# Patient Record
Sex: FEMALE | Race: BLACK OR AFRICAN AMERICAN | Employment: FULL TIME | URBAN - METROPOLITAN AREA
[De-identification: names, ages, dates, MRNs, and addresses within clinical notes are randomized per-mention and may not be internally consistent; named-entity substitution may affect disease eponyms.]

---

## 2017-01-30 ENCOUNTER — OFFICE VISIT (OUTPATIENT)
Dept: INTERNAL MEDICINE CLINIC | Age: 27
End: 2017-01-30

## 2017-01-30 VITALS
WEIGHT: 267 LBS | SYSTOLIC BLOOD PRESSURE: 120 MMHG | OXYGEN SATURATION: 98 % | BODY MASS INDEX: 44.48 KG/M2 | DIASTOLIC BLOOD PRESSURE: 80 MMHG | HEIGHT: 65 IN | HEART RATE: 83 BPM

## 2017-01-30 DIAGNOSIS — R20.0 NUMBNESS AND TINGLING OF BOTH LEGS: ICD-10-CM

## 2017-01-30 DIAGNOSIS — E11.69 TYPE 2 DIABETES MELLITUS WITH OTHER SPECIFIED COMPLICATION, WITH LONG-TERM CURRENT USE OF INSULIN (HCC): Primary | ICD-10-CM

## 2017-01-30 DIAGNOSIS — F17.200 TOBACCO DEPENDENCE: ICD-10-CM

## 2017-01-30 DIAGNOSIS — J45.909 ASTHMA, MODERATE: ICD-10-CM

## 2017-01-30 DIAGNOSIS — Z79.4 TYPE 2 DIABETES MELLITUS WITH OTHER SPECIFIED COMPLICATION, WITH LONG-TERM CURRENT USE OF INSULIN (HCC): Primary | ICD-10-CM

## 2017-01-30 DIAGNOSIS — E66.01 MORBID OBESITY WITH BMI OF 40.0-44.9, ADULT (HCC): ICD-10-CM

## 2017-01-30 DIAGNOSIS — E55.9 VITAMIN D DEFICIENCY: ICD-10-CM

## 2017-01-30 DIAGNOSIS — N92.6 MENSTRUAL IRREGULARITY: ICD-10-CM

## 2017-01-30 DIAGNOSIS — E78.2 MIXED HYPERLIPIDEMIA: ICD-10-CM

## 2017-01-30 DIAGNOSIS — R20.2 NUMBNESS AND TINGLING OF BOTH LEGS: ICD-10-CM

## 2017-01-30 LAB
GLUCOSE BLD-MCNC: 127 MG/DL
HBA1C MFR BLD: 6.3 %

## 2017-01-30 PROCEDURE — 83036 HEMOGLOBIN GLYCOSYLATED A1C: CPT | Performed by: INTERNAL MEDICINE

## 2017-01-30 PROCEDURE — 99406 BEHAV CHNG SMOKING 3-10 MIN: CPT | Performed by: INTERNAL MEDICINE

## 2017-01-30 PROCEDURE — 99214 OFFICE O/P EST MOD 30 MIN: CPT | Performed by: INTERNAL MEDICINE

## 2017-01-30 PROCEDURE — 82962 GLUCOSE BLOOD TEST: CPT | Performed by: INTERNAL MEDICINE

## 2017-05-09 ENCOUNTER — OFFICE VISIT (OUTPATIENT)
Dept: INTERNAL MEDICINE CLINIC | Age: 27
End: 2017-05-09

## 2017-05-09 VITALS
DIASTOLIC BLOOD PRESSURE: 86 MMHG | WEIGHT: 279 LBS | OXYGEN SATURATION: 98 % | SYSTOLIC BLOOD PRESSURE: 120 MMHG | HEART RATE: 78 BPM | BODY MASS INDEX: 46.48 KG/M2 | HEIGHT: 65 IN

## 2017-05-09 DIAGNOSIS — F17.200 TOBACCO DEPENDENCE: ICD-10-CM

## 2017-05-09 DIAGNOSIS — J45.909 ASTHMA, MODERATE: ICD-10-CM

## 2017-05-09 DIAGNOSIS — Z79.4 TYPE 2 DIABETES MELLITUS WITH OTHER SPECIFIED COMPLICATION, WITH LONG-TERM CURRENT USE OF INSULIN (HCC): Primary | ICD-10-CM

## 2017-05-09 DIAGNOSIS — R20.2 NUMBNESS AND TINGLING: ICD-10-CM

## 2017-05-09 DIAGNOSIS — E78.2 MIXED HYPERLIPIDEMIA: ICD-10-CM

## 2017-05-09 DIAGNOSIS — N91.2 AMENORRHEA: ICD-10-CM

## 2017-05-09 DIAGNOSIS — R20.0 NUMBNESS AND TINGLING: ICD-10-CM

## 2017-05-09 DIAGNOSIS — R10.30 LOWER ABDOMINAL PAIN: ICD-10-CM

## 2017-05-09 DIAGNOSIS — E11.69 TYPE 2 DIABETES MELLITUS WITH OTHER SPECIFIED COMPLICATION, WITH LONG-TERM CURRENT USE OF INSULIN (HCC): Primary | ICD-10-CM

## 2017-05-09 DIAGNOSIS — E55.9 VITAMIN D DEFICIENCY: ICD-10-CM

## 2017-05-09 DIAGNOSIS — Z11.3 SCREEN FOR STD (SEXUALLY TRANSMITTED DISEASE): ICD-10-CM

## 2017-05-09 DIAGNOSIS — E66.01 MORBID OBESITY WITH BMI OF 45.0-49.9, ADULT (HCC): ICD-10-CM

## 2017-05-09 DIAGNOSIS — L60.8 TOENAIL DEFORMITY: ICD-10-CM

## 2017-05-09 DIAGNOSIS — R35.0 URINARY FREQUENCY: ICD-10-CM

## 2017-05-09 LAB
BILIRUBIN, POC: NORMAL
BLOOD URINE, POC: NORMAL
CLARITY, POC: CLEAR
COLOR, POC: YELLOW
CONTROL: NORMAL
GLUCOSE BLD-MCNC: 129 MG/DL
GLUCOSE URINE, POC: NORMAL
HBA1C MFR BLD: 6.6 %
KETONES, POC: NORMAL
LEUKOCYTE EST, POC: NORMAL
NITRITE, POC: NORMAL
PH, POC: 6
PREGNANCY TEST URINE, POC: NEGATIVE
PROTEIN, POC: NORMAL
SPECIFIC GRAVITY, POC: 1.03
UROBILINOGEN, POC: 0.2

## 2017-05-09 PROCEDURE — 81002 URINALYSIS NONAUTO W/O SCOPE: CPT | Performed by: INTERNAL MEDICINE

## 2017-05-09 PROCEDURE — 99214 OFFICE O/P EST MOD 30 MIN: CPT | Performed by: INTERNAL MEDICINE

## 2017-05-09 PROCEDURE — 81025 URINE PREGNANCY TEST: CPT | Performed by: INTERNAL MEDICINE

## 2017-05-09 PROCEDURE — 83036 HEMOGLOBIN GLYCOSYLATED A1C: CPT | Performed by: INTERNAL MEDICINE

## 2017-05-09 PROCEDURE — 82962 GLUCOSE BLOOD TEST: CPT | Performed by: INTERNAL MEDICINE

## 2017-05-09 PROCEDURE — 99406 BEHAV CHNG SMOKING 3-10 MIN: CPT | Performed by: INTERNAL MEDICINE

## 2017-05-09 RX ORDER — MULTIVIT-MIN/IRON/FOLIC ACID/K 18-600-40
1 CAPSULE ORAL DAILY
Qty: 30 CAPSULE | Refills: 1 | Status: SHIPPED | OUTPATIENT
Start: 2017-05-09 | End: 2017-06-20 | Stop reason: SDUPTHER

## 2017-05-09 RX ORDER — SIMVASTATIN 10 MG
10 TABLET ORAL NIGHTLY
Qty: 30 TABLET | Refills: 0 | Status: SHIPPED | OUTPATIENT
Start: 2017-05-09 | End: 2017-06-20 | Stop reason: SDUPTHER

## 2017-05-10 LAB
C. TRACHOMATIS DNA ,URINE: NEGATIVE
N. GONORRHOEAE DNA, URINE: NEGATIVE

## 2017-05-11 DIAGNOSIS — R20.2 PARESTHESIA OF BOTH FEET: ICD-10-CM

## 2017-05-11 DIAGNOSIS — Z11.3 SCREEN FOR STD (SEXUALLY TRANSMITTED DISEASE): ICD-10-CM

## 2017-05-11 LAB
FOLATE: 12.47 NG/ML (ref 4.78–24.2)
HEPATITIS C ANTIBODY INTERPRETATION: NORMAL
VITAMIN B-12: 608 PG/ML (ref 211–911)

## 2017-05-12 LAB — HIV-1 AND HIV-2 ANTIBODIES: NEGATIVE

## 2017-06-20 ENCOUNTER — OFFICE VISIT (OUTPATIENT)
Dept: INTERNAL MEDICINE CLINIC | Age: 27
End: 2017-06-20

## 2017-06-20 VITALS
SYSTOLIC BLOOD PRESSURE: 120 MMHG | HEART RATE: 87 BPM | HEIGHT: 65 IN | WEIGHT: 282 LBS | DIASTOLIC BLOOD PRESSURE: 80 MMHG | BODY MASS INDEX: 46.98 KG/M2 | OXYGEN SATURATION: 98 %

## 2017-06-20 DIAGNOSIS — M54.5 MIDLINE LOW BACK PAIN, UNSPECIFIED CHRONICITY, WITH SCIATICA PRESENCE UNSPECIFIED: ICD-10-CM

## 2017-06-20 DIAGNOSIS — N39.0 URINARY TRACT INFECTION WITHOUT HEMATURIA, SITE UNSPECIFIED: ICD-10-CM

## 2017-06-20 DIAGNOSIS — E66.01 MORBID OBESITY WITH BMI OF 45.0-49.9, ADULT (HCC): ICD-10-CM

## 2017-06-20 DIAGNOSIS — Z79.4 TYPE 2 DIABETES MELLITUS WITH OTHER SPECIFIED COMPLICATION, WITH LONG-TERM CURRENT USE OF INSULIN (HCC): Primary | ICD-10-CM

## 2017-06-20 DIAGNOSIS — R20.0 NUMBNESS AND TINGLING: ICD-10-CM

## 2017-06-20 DIAGNOSIS — Z87.891 SMOKING HISTORY: ICD-10-CM

## 2017-06-20 DIAGNOSIS — R20.2 NUMBNESS AND TINGLING: ICD-10-CM

## 2017-06-20 DIAGNOSIS — J45.909 ASTHMA, MODERATE: ICD-10-CM

## 2017-06-20 DIAGNOSIS — E78.2 MIXED HYPERLIPIDEMIA: ICD-10-CM

## 2017-06-20 DIAGNOSIS — E11.69 TYPE 2 DIABETES MELLITUS WITH OTHER SPECIFIED COMPLICATION, WITH LONG-TERM CURRENT USE OF INSULIN (HCC): Primary | ICD-10-CM

## 2017-06-20 DIAGNOSIS — E55.9 VITAMIN D DEFICIENCY: ICD-10-CM

## 2017-06-20 LAB
BILIRUBIN, POC: NORMAL
BLOOD URINE, POC: NORMAL
CLARITY, POC: CLEAR
COLOR, POC: YELLOW
GLUCOSE BLD-MCNC: 155 MG/DL
GLUCOSE URINE, POC: NORMAL
KETONES, POC: NORMAL
LEUKOCYTE EST, POC: NORMAL
NITRITE, POC: NORMAL
PH, POC: 6
PROTEIN, POC: NORMAL
SPECIFIC GRAVITY, POC: 1.02
UROBILINOGEN, POC: 0.2

## 2017-06-20 PROCEDURE — 81002 URINALYSIS NONAUTO W/O SCOPE: CPT | Performed by: INTERNAL MEDICINE

## 2017-06-20 PROCEDURE — 99214 OFFICE O/P EST MOD 30 MIN: CPT | Performed by: INTERNAL MEDICINE

## 2017-06-20 PROCEDURE — 82962 GLUCOSE BLOOD TEST: CPT | Performed by: INTERNAL MEDICINE

## 2017-06-20 RX ORDER — SIMVASTATIN 10 MG
10 TABLET ORAL NIGHTLY
Qty: 30 TABLET | Refills: 0 | Status: SHIPPED | OUTPATIENT
Start: 2017-06-20 | End: 2017-08-22 | Stop reason: SDUPTHER

## 2017-06-20 RX ORDER — SULFAMETHOXAZOLE AND TRIMETHOPRIM 800; 160 MG/1; MG/1
1 TABLET ORAL 2 TIMES DAILY
Qty: 6 TABLET | Refills: 0 | Status: SHIPPED | OUTPATIENT
Start: 2017-06-20 | End: 2017-06-23

## 2017-06-20 RX ORDER — MULTIVIT-MIN/IRON/FOLIC ACID/K 18-600-40
1 CAPSULE ORAL DAILY
Qty: 30 CAPSULE | Refills: 1 | Status: SHIPPED | OUTPATIENT
Start: 2017-06-20 | End: 2017-08-22 | Stop reason: SDUPTHER

## 2017-06-22 LAB
ORGANISM: ABNORMAL
URINE CULTURE, ROUTINE: ABNORMAL
URINE CULTURE, ROUTINE: ABNORMAL

## 2017-06-23 DIAGNOSIS — E11.69 TYPE 2 DIABETES MELLITUS WITH OTHER SPECIFIED COMPLICATION, WITH LONG-TERM CURRENT USE OF INSULIN (HCC): ICD-10-CM

## 2017-06-23 DIAGNOSIS — R20.2 NUMBNESS AND TINGLING: ICD-10-CM

## 2017-06-23 DIAGNOSIS — E55.9 VITAMIN D DEFICIENCY: ICD-10-CM

## 2017-06-23 DIAGNOSIS — E78.2 MIXED HYPERLIPIDEMIA: ICD-10-CM

## 2017-06-23 DIAGNOSIS — R20.0 NUMBNESS AND TINGLING: ICD-10-CM

## 2017-06-23 DIAGNOSIS — Z79.4 TYPE 2 DIABETES MELLITUS WITH OTHER SPECIFIED COMPLICATION, WITH LONG-TERM CURRENT USE OF INSULIN (HCC): ICD-10-CM

## 2017-06-24 LAB
ALBUMIN SERPL-MCNC: 4.3 G/DL (ref 3.4–5)
ALP BLD-CCNC: 39 U/L (ref 40–129)
ALT SERPL-CCNC: 23 U/L (ref 10–40)
ANION GAP SERPL CALCULATED.3IONS-SCNC: 15 MMOL/L (ref 3–16)
AST SERPL-CCNC: 22 U/L (ref 15–37)
BASOPHILS ABSOLUTE: 0 K/UL (ref 0–0.2)
BASOPHILS RELATIVE PERCENT: 0.4 %
BILIRUB SERPL-MCNC: 0.3 MG/DL (ref 0–1)
BILIRUBIN DIRECT: <0.2 MG/DL (ref 0–0.3)
BILIRUBIN, INDIRECT: ABNORMAL MG/DL (ref 0–1)
BUN BLDV-MCNC: 11 MG/DL (ref 7–20)
CALCIUM SERPL-MCNC: 9.4 MG/DL (ref 8.3–10.6)
CHLORIDE BLD-SCNC: 102 MMOL/L (ref 99–110)
CHOLESTEROL, TOTAL: 170 MG/DL (ref 0–199)
CO2: 24 MMOL/L (ref 21–32)
CREAT SERPL-MCNC: 0.8 MG/DL (ref 0.6–1.1)
CREATININE URINE: 70.7 MG/DL (ref 28–259)
EOSINOPHILS ABSOLUTE: 0.2 K/UL (ref 0–0.6)
EOSINOPHILS RELATIVE PERCENT: 2.1 %
GFR AFRICAN AMERICAN: >60
GFR NON-AFRICAN AMERICAN: >60
GLUCOSE BLD-MCNC: 131 MG/DL (ref 70–99)
HCT VFR BLD CALC: 41.1 % (ref 36–48)
HDLC SERPL-MCNC: 55 MG/DL (ref 40–60)
HEMOGLOBIN: 13 G/DL (ref 12–16)
LDL CHOLESTEROL CALCULATED: 89 MG/DL
LYMPHOCYTES ABSOLUTE: 2.2 K/UL (ref 1–5.1)
LYMPHOCYTES RELATIVE PERCENT: 23.8 %
MCH RBC QN AUTO: 27.6 PG (ref 26–34)
MCHC RBC AUTO-ENTMCNC: 31.6 G/DL (ref 31–36)
MCV RBC AUTO: 87.2 FL (ref 80–100)
MICROALBUMIN UR-MCNC: 1.7 MG/DL
MICROALBUMIN/CREAT UR-RTO: 24 MG/G (ref 0–30)
MONOCYTES ABSOLUTE: 0.6 K/UL (ref 0–1.3)
MONOCYTES RELATIVE PERCENT: 6.4 %
NEUTROPHILS ABSOLUTE: 6.2 K/UL (ref 1.7–7.7)
NEUTROPHILS RELATIVE PERCENT: 67.3 %
PDW BLD-RTO: 13.9 % (ref 12.4–15.4)
PLATELET # BLD: 245 K/UL (ref 135–450)
PMV BLD AUTO: 8.6 FL (ref 5–10.5)
POTASSIUM SERPL-SCNC: 4.7 MMOL/L (ref 3.5–5.1)
RBC # BLD: 4.71 M/UL (ref 4–5.2)
SODIUM BLD-SCNC: 141 MMOL/L (ref 136–145)
TOTAL PROTEIN: 6.9 G/DL (ref 6.4–8.2)
TRIGL SERPL-MCNC: 129 MG/DL (ref 0–150)
TSH REFLEX: 1.41 UIU/ML (ref 0.27–4.2)
VITAMIN D 25-HYDROXY: 23.4 NG/ML
VLDLC SERPL CALC-MCNC: 26 MG/DL
WBC # BLD: 9.2 K/UL (ref 4–11)

## 2017-08-22 ENCOUNTER — OFFICE VISIT (OUTPATIENT)
Dept: INTERNAL MEDICINE CLINIC | Age: 27
End: 2017-08-22

## 2017-08-22 VITALS
TEMPERATURE: 98.7 F | BODY MASS INDEX: 46.22 KG/M2 | SYSTOLIC BLOOD PRESSURE: 120 MMHG | HEIGHT: 65 IN | HEART RATE: 68 BPM | WEIGHT: 277.4 LBS | OXYGEN SATURATION: 98 % | DIASTOLIC BLOOD PRESSURE: 72 MMHG

## 2017-08-22 DIAGNOSIS — J45.909 ASTHMA, MODERATE: ICD-10-CM

## 2017-08-22 DIAGNOSIS — E55.9 VITAMIN D DEFICIENCY: ICD-10-CM

## 2017-08-22 DIAGNOSIS — M54.50 MIDLINE LOW BACK PAIN WITHOUT SCIATICA, UNSPECIFIED CHRONICITY: ICD-10-CM

## 2017-08-22 DIAGNOSIS — E78.5 HYPERLIPIDEMIA LDL GOAL <100: ICD-10-CM

## 2017-08-22 DIAGNOSIS — F17.200 TOBACCO DEPENDENCE: ICD-10-CM

## 2017-08-22 DIAGNOSIS — E66.01 MORBID OBESITY WITH BMI OF 45.0-49.9, ADULT (HCC): ICD-10-CM

## 2017-08-22 DIAGNOSIS — R21 RASH: ICD-10-CM

## 2017-08-22 LAB
GLUCOSE BLD-MCNC: 260 MG/DL
HBA1C MFR BLD: 7.5 %

## 2017-08-22 PROCEDURE — 96372 THER/PROPH/DIAG INJ SC/IM: CPT | Performed by: INTERNAL MEDICINE

## 2017-08-22 PROCEDURE — 83036 HEMOGLOBIN GLYCOSYLATED A1C: CPT | Performed by: INTERNAL MEDICINE

## 2017-08-22 PROCEDURE — 99214 OFFICE O/P EST MOD 30 MIN: CPT | Performed by: INTERNAL MEDICINE

## 2017-08-22 PROCEDURE — 82962 GLUCOSE BLOOD TEST: CPT | Performed by: INTERNAL MEDICINE

## 2017-08-22 RX ORDER — SIMVASTATIN 10 MG
10 TABLET ORAL NIGHTLY
Qty: 30 TABLET | Refills: 2 | Status: SHIPPED | OUTPATIENT
Start: 2017-08-22 | End: 2017-10-03 | Stop reason: SDUPTHER

## 2017-08-22 RX ORDER — MULTIVIT-MIN/IRON/FOLIC ACID/K 18-600-40
1 CAPSULE ORAL DAILY
Qty: 30 CAPSULE | Refills: 3 | Status: SHIPPED | OUTPATIENT
Start: 2017-08-22 | End: 2018-03-26 | Stop reason: SDUPTHER

## 2017-08-22 ASSESSMENT — PATIENT HEALTH QUESTIONNAIRE - PHQ9
1. LITTLE INTEREST OR PLEASURE IN DOING THINGS: 0
SUM OF ALL RESPONSES TO PHQ QUESTIONS 1-9: 0
SUM OF ALL RESPONSES TO PHQ9 QUESTIONS 1 & 2: 0
2. FEELING DOWN, DEPRESSED OR HOPELESS: 0

## 2017-10-03 ENCOUNTER — OFFICE VISIT (OUTPATIENT)
Dept: INTERNAL MEDICINE CLINIC | Age: 27
End: 2017-10-03

## 2017-10-03 VITALS
TEMPERATURE: 98.3 F | BODY MASS INDEX: 46.48 KG/M2 | DIASTOLIC BLOOD PRESSURE: 80 MMHG | HEART RATE: 64 BPM | SYSTOLIC BLOOD PRESSURE: 120 MMHG | HEIGHT: 65 IN | WEIGHT: 279 LBS

## 2017-10-03 DIAGNOSIS — R21 RASH: ICD-10-CM

## 2017-10-03 DIAGNOSIS — E55.9 VITAMIN D DEFICIENCY: ICD-10-CM

## 2017-10-03 DIAGNOSIS — E78.5 HYPERLIPIDEMIA LDL GOAL <100: ICD-10-CM

## 2017-10-03 DIAGNOSIS — M54.5 CHRONIC MIDLINE LOW BACK PAIN, WITH SCIATICA PRESENCE UNSPECIFIED: ICD-10-CM

## 2017-10-03 DIAGNOSIS — J45.909 ASTHMA, MODERATE: ICD-10-CM

## 2017-10-03 DIAGNOSIS — Z87.891 SMOKING HISTORY: ICD-10-CM

## 2017-10-03 DIAGNOSIS — E66.01 MORBID OBESITY WITH BMI OF 45.0-49.9, ADULT (HCC): ICD-10-CM

## 2017-10-03 DIAGNOSIS — G89.29 CHRONIC MIDLINE LOW BACK PAIN, WITH SCIATICA PRESENCE UNSPECIFIED: ICD-10-CM

## 2017-10-03 LAB — GLUCOSE BLD-MCNC: 169 MG/DL

## 2017-10-03 PROCEDURE — 82962 GLUCOSE BLOOD TEST: CPT | Performed by: INTERNAL MEDICINE

## 2017-10-03 PROCEDURE — 99214 OFFICE O/P EST MOD 30 MIN: CPT | Performed by: INTERNAL MEDICINE

## 2017-10-03 RX ORDER — NAPROXEN 500 MG/1
500 TABLET ORAL 2 TIMES DAILY PRN
Qty: 60 TABLET | Refills: 0 | Status: SHIPPED | OUTPATIENT
Start: 2017-10-03 | End: 2017-12-26 | Stop reason: SDUPTHER

## 2017-10-03 RX ORDER — SIMVASTATIN 10 MG
10 TABLET ORAL NIGHTLY
Qty: 30 TABLET | Refills: 2 | Status: SHIPPED | OUTPATIENT
Start: 2017-10-03 | End: 2017-12-26 | Stop reason: SDUPTHER

## 2017-10-03 NOTE — PROGRESS NOTES
SUBJECTIVE:  Mable Viera is a 27 y.o. female [address] [place]   Chief Complaint   Patient presents with    Follow-up     patient here for followup diabetes    Hyperlipidemia    Other        PT HERE FOR EVAL    DM- TAKING INCREASED LANTUS DOSE. ? EXERCISE / DIET COMPLIANCE . HBS - 130- 140'S .  EYE EXAM 5/17  HLP - TAKING MED. NO MUSCLE CRAMPS / NO MUSCLE ACHES. LABS D/W PT  VIT D DEF - TAKING MED. LABS D/W PT  ASTHMA - DENIES WHEEZING, NO SOB, NO INCREASED INHALER USE  LOW BACK PAIN - PERSISTENT. INTERMITTENT. MIDLINE. Valladares Newcomer ACHE, OCC RAD LLE. PAIN SCALE 4/10. OCC NUMBNESS /  TINGLING - LT LE, NO INCONTINENCE  RASH -  IMPROVED  SMOKING HX -  HAS BEEN SMOKE FREE X 2 WEEKS  MORBID OBESITY - DIET / EXERCISE REVIEWED. WT  NOTE    DENIES CP, No SOB, No PALPITATIONS, No COUGH  No ABD PAIN, No N/V, No DIARRHEA, No CONSTIPATION, No MELENA, No HEMATOCHEZIA. No DYSURIA, No FREQ, No URGENCY, No HEMATURIA    PMH: REVIEWED    PSH: REVIEWED:    ALLERGY:  Codeine and Lactose    MEDS: REVIEWED    ROS: COMPREHENSIVE ROS AS IN HX, REST -VE  History obtained from chart review and the patient    OBJECTIVE:   NURSING NOTE AND VITALS REVIEWED  /80 (Site: Right Arm, Position: Sitting, Cuff Size: Large Adult)  Pulse 64  Temp 98.3 °F (36.8 °C) (Oral)   Ht 5' 5\" (1.651 m)  Wt 279 lb (126.6 kg)  LMP 09/11/2017 (Exact Date)  BMI 46.43 kg/m2    NO ACUTE DISTRESS    REPEAT BP:  120/80 (RT), NO ORTHOSTASIS     Body mass index is 46.43 kg/(m^2). HEENT: NO PALLOR, ANICTERIC, PERRLA, EOMI, NO CONJUNCTIVAL ERYTHEMA,                 NO SINUS TENDERNESS  NECK:  SUPPLE, TRACHEA MIDLINE, NT, NO JVD, NO CB, NO LA, NO TM, NO STIFFNESS  CHEST: RESPY EFFORT NL, GOOD AE, NO W/R/C  HEART: S1S2+ REG, NO M/G/R  ABD: OBESE, SOFT, NT, NO HSM, BS+  EXT: NO EDEMA, NT, PULSES +.  MARI'S -VE  NEURO: ALERT AND ORIENTED X 3, NO MENINGEAL SIGNS, NO TREMORS, NL GAIT, NO FOCAL DEFICITS  PSYCH: FAIRLY GOOD AFFECT  BACK: NT, OCC PAIN WITH MVT, NO ROM, NO CVA TENDERNESS  SKIN: NO ACUTE LESIONS. TATTOS NOTED    PREVIOUS LABS REVIEWED AND D/W PT    ACCUCHECK: 169      ASSESSMENT / PLAN:      1. DM (diabetes mellitus), secondary uncontrolled ([facility]  COUNSELLED. DEFERRED MED CHANGE  CONTINUE LANTUS  MED COMPLIANCE ADVISED  DIET/ EXERCISE ADVISED. MONITOR. GOAL FBS 80- 120, HBA1C < 7  ADVISED ON HOME BLOOD SUGAR MONITORING  MAKE CHANGES AS NEEDED. 2. Hyperlipidemia LDL goal <100  COUNSELLED. STABLE ON ZOCOR 10 MG   ADVISED LOW FAT / CHOL DIET/ EXERCISE.  MONITOR. GOALS D/W PT.  MAKE CHANGES AS NEEDED. 3. Vitamin D deficiency  COUNSELLED. ADVISED MED COMPLIANCE - ADVISED VIT D 2000 U DAILY. MONITOR AND MAKE CHANGES AS NEEDED. 4. Asthma, moderate  COUNSELLED. CONTINUE ADVAIR. ALBUTEROL PRN. MONITOR. MONITOR FOR TRIGGERS- ENVIRONMENTAL MODIFICATION. MAKE CHANGES AS NEEDED. 5. Chronic midline low back pain, with sciatica presence unspecified  COUNSELLED. ? OA. EXERCISES. ANALGESICS PRN. MONITOR. NAPROSYN 500 MG BID / PRN WITH FOOD  MAKE CHANGES AS NEEDED. 6. Rash  COUNSELLED. Zack Hart IMPROVED. MONITOR  MAKE CHANGES AS NEEDED. 7. Smoking history  COUNSELLED. CESSATION ENCOURAGED  MAKE CHANGES AS NEEDED. 8. Morbid obesity with BMI of 45.0-49.9, adult ([facility]  COUNSELLED. DIET/ EXERCISE ADVISED. LIFESTYLE MODIFICATION. WT LOSS ADVISED. MONITOR AND MAKE CHANGES AS NEEDED. MORE THAN HALF THE TIME SPENT ON COUNSELLING    Atul received counseling on the following healthy behaviors: nutrition, exercise, medication adherence and tobacco cessation    Patient given educational materials on Diabetes, Hyperlipidemia, Smoking Cessation, Nutrition and Exercise    I have instructed Atul to complete a self tracking handout on Blood Sugars , Weights and Smoking and instructed them to bring it with them to her next appointment. Discussed use, benefit, and side effects of prescribed medications.   Barriers to medication compliance addressed. All patient questions answered. Pt voiced understanding.           MEDICATION SIDE EFFECTS D/W PATIENT    PT STABLE AT TIME OF D/C.    RETURN TO CLINIC WITHIN 6-8 WEEKS / PRN

## 2017-10-03 NOTE — MR AVS SNAPSHOT
Cholecalciferol (VITAMIN D) 2000 units CAPS capsule Take 1 capsule by mouth daily    Blood Glucose Monitoring Suppl (FREESTYLE LITE) SHANE FOLLOW PACKAGE DIRECTIONS    albuterol sulfate HFA (VENTOLIN HFA) 108 (90 BASE) MCG/ACT inhaler Inhale 2 puffs into the lungs 4 times daily as needed for Wheezing    terbinafine (ATHLETES FOOT) 1 % cream Apply topically 2 times daily. glucose blood VI test strips (ONE TOUCH TEST STRIPS) strip 1 each by In Vitro route 2 times daily As needed. Allergies              Codeine     Lactose Nausea And Vomiting      We Ordered/Performed the following           POCT Glucose          Additional Information        Basic Information     Date Of Birth Sex Race Ethnicity Preferred Language    1990 Female Black Non-/Non  English      Problem List as of 10/3/2017  Date Reviewed: 10/3/2017                Morbid obesity with BMI of 45.0-49.9, adult ([facility]    Tobacco dependence    Paresthesia of both feet - toes    Tinea pedis of right foot    Diabetes mellitus, with long-term current use of insulin (HCC)    Microalbuminuria    Hyperlipidemia    Asthma, moderate    Vitamin D deficiency    Tinea pedis of both feet    Menstrual irregularity / AMENORRHEA    Insomnia    Allergic rhinitis      Immunizations as of 10/3/2017     Name Date    Influenza, Intradermal, Preservative free 12/4/2013      Preventive Care        Date Due    Tetanus Combination Vaccine (1 - Tdap) 10/30/2011    Pap Smear 12/4/2016    Yearly Flu Vaccine (1) 9/1/2018 (Originally 9/1/2017)    Diabetic Foot Exam 12/19/2017    Eye Exam By An Eye Doctor 5/24/2018    Urine Check For Kidney Problems 6/23/2018    Cholesterol Screening 6/23/2018    Hemoglobin A1C (Test For Long-Term Glucose Control) 8/22/2018            MyChart Signup           Our records indicate that you have an active [employer] account.     You can view your After Visit Summary by going to

## 2017-12-26 ENCOUNTER — OFFICE VISIT (OUTPATIENT)
Dept: INTERNAL MEDICINE CLINIC | Age: 27
End: 2017-12-26

## 2017-12-26 VITALS
HEART RATE: 79 BPM | BODY MASS INDEX: 46.82 KG/M2 | HEIGHT: 65 IN | DIASTOLIC BLOOD PRESSURE: 80 MMHG | WEIGHT: 281 LBS | TEMPERATURE: 98.2 F | OXYGEN SATURATION: 99 % | SYSTOLIC BLOOD PRESSURE: 120 MMHG

## 2017-12-26 DIAGNOSIS — M54.42 ACUTE MIDLINE LOW BACK PAIN WITH BILATERAL SCIATICA: ICD-10-CM

## 2017-12-26 DIAGNOSIS — J45.40 MODERATE PERSISTENT ASTHMA WITHOUT COMPLICATION: ICD-10-CM

## 2017-12-26 DIAGNOSIS — E66.01 MORBID OBESITY WITH BMI OF 45.0-49.9, ADULT (HCC): ICD-10-CM

## 2017-12-26 DIAGNOSIS — F17.200 TOBACCO DEPENDENCE: ICD-10-CM

## 2017-12-26 DIAGNOSIS — E78.5 HYPERLIPIDEMIA LDL GOAL <100: ICD-10-CM

## 2017-12-26 DIAGNOSIS — M54.41 ACUTE MIDLINE LOW BACK PAIN WITH BILATERAL SCIATICA: ICD-10-CM

## 2017-12-26 DIAGNOSIS — E55.9 VITAMIN D DEFICIENCY: ICD-10-CM

## 2017-12-26 LAB
ALBUMIN SERPL-MCNC: 4.2 G/DL (ref 3.4–5)
ALP BLD-CCNC: 44 U/L (ref 40–129)
ALT SERPL-CCNC: 23 U/L (ref 10–40)
ANION GAP SERPL CALCULATED.3IONS-SCNC: 12 MMOL/L (ref 3–16)
AST SERPL-CCNC: 27 U/L (ref 15–37)
BILIRUB SERPL-MCNC: <0.2 MG/DL (ref 0–1)
BILIRUBIN DIRECT: <0.2 MG/DL (ref 0–0.3)
BILIRUBIN, INDIRECT: NORMAL MG/DL (ref 0–1)
BILIRUBIN, POC: NEGATIVE
BLOOD URINE, POC: NEGATIVE
BUN BLDV-MCNC: 6 MG/DL (ref 7–20)
CALCIUM SERPL-MCNC: 9.5 MG/DL (ref 8.3–10.6)
CHLORIDE BLD-SCNC: 101 MMOL/L (ref 99–110)
CLARITY, POC: CLEAR
CO2: 26 MMOL/L (ref 21–32)
COLOR, POC: YELLOW
CREAT SERPL-MCNC: 0.6 MG/DL (ref 0.6–1.1)
GFR AFRICAN AMERICAN: >60
GFR NON-AFRICAN AMERICAN: >60
GLUCOSE BLD-MCNC: 138 MG/DL (ref 70–99)
GLUCOSE BLD-MCNC: 183 MG/DL
GLUCOSE URINE, POC: 250
HBA1C MFR BLD: 7.1 %
KETONES, POC: NEGATIVE
LEUKOCYTE EST, POC: NEGATIVE
NITRITE, POC: NEGATIVE
PH, POC: 5
POTASSIUM SERPL-SCNC: 4.6 MMOL/L (ref 3.5–5.1)
PROTEIN, POC: NEGATIVE
SODIUM BLD-SCNC: 139 MMOL/L (ref 136–145)
SPECIFIC GRAVITY, POC: 1.02
TOTAL PROTEIN: 7.2 G/DL (ref 6.4–8.2)
UROBILINOGEN, POC: 0.2
VITAMIN D 25-HYDROXY: 23.4 NG/ML

## 2017-12-26 PROCEDURE — 4004F PT TOBACCO SCREEN RCVD TLK: CPT | Performed by: INTERNAL MEDICINE

## 2017-12-26 PROCEDURE — 99214 OFFICE O/P EST MOD 30 MIN: CPT | Performed by: INTERNAL MEDICINE

## 2017-12-26 PROCEDURE — 83036 HEMOGLOBIN GLYCOSYLATED A1C: CPT | Performed by: INTERNAL MEDICINE

## 2017-12-26 PROCEDURE — 99406 BEHAV CHNG SMOKING 3-10 MIN: CPT | Performed by: INTERNAL MEDICINE

## 2017-12-26 PROCEDURE — G8484 FLU IMMUNIZE NO ADMIN: HCPCS | Performed by: INTERNAL MEDICINE

## 2017-12-26 PROCEDURE — 82962 GLUCOSE BLOOD TEST: CPT | Performed by: INTERNAL MEDICINE

## 2017-12-26 PROCEDURE — 81002 URINALYSIS NONAUTO W/O SCOPE: CPT | Performed by: INTERNAL MEDICINE

## 2017-12-26 PROCEDURE — G8427 DOCREV CUR MEDS BY ELIG CLIN: HCPCS | Performed by: INTERNAL MEDICINE

## 2017-12-26 PROCEDURE — 3045F PR MOST RECENT HEMOGLOBIN A1C LEVEL 7.0-9.0%: CPT | Performed by: INTERNAL MEDICINE

## 2017-12-26 PROCEDURE — G8417 CALC BMI ABV UP PARAM F/U: HCPCS | Performed by: INTERNAL MEDICINE

## 2017-12-26 RX ORDER — SIMVASTATIN 10 MG
10 TABLET ORAL NIGHTLY
Qty: 30 TABLET | Refills: 3 | Status: SHIPPED | OUTPATIENT
Start: 2017-12-26 | End: 2018-03-26 | Stop reason: SDUPTHER

## 2017-12-26 RX ORDER — NAPROXEN 500 MG/1
500 TABLET ORAL 2 TIMES DAILY PRN
Qty: 60 TABLET | Refills: 0 | Status: SHIPPED | OUTPATIENT
Start: 2017-12-26 | End: 2018-03-26 | Stop reason: ALTCHOICE

## 2017-12-26 NOTE — PROGRESS NOTES
SUBJECTIVE:  Meenakshi Terry is a 24 y.o. female [address] [place]   Chief Complaint   Patient presents with    Diabetes     follow up    Lower Back Pain    Other        PT HERE FOR EVAL    DM- TAKING INCREASED LANTUS DOSE. ? EXERCISE / DIET COMPLIANCE . HBS - 130- 140'S .  EYE EXAM 5/17  HLP - TAKING MED. NO MUSCLE CRAMPS / NO MUSCLE ACHES. STABLE ON LABS - D/W PT  VIT D DEF - TAKING MED. LABS D/W PT  ASTHMA - DENIES WHEEZING, NO SOB, OCC INCREASED INHALER USE  C/O LOW BACK PAIN - MIDLINE . SHARP PAIN. INCREASED PAST FEW MONTHS. SHARP PAIN, + RAD TO TNOYA LE, + NUMBNESS / NO TING;ING. NO INCONTINENCE. + PRIOR HX  TOBACCO USE- RESTARTED SMOKING. CESSATION   MORBID OBESITY - DIET / EXERCISE REVIEWED. WT  NOTE    DENIES CP, No SOB, No PALPITATIONS, OCC COUGH = OCC PRODUCTIVE CLEAR PHLEGM , NO HEMOPTYSIS. NO F/C  No ABD PAIN, No N/V, No DIARRHEA, No CONSTIPATION, No MELENA, No HEMATOCHEZIA. No DYSURIA, No FREQ, No URGENCY, No HEMATURIA    PMH: REVIEWED    PSH: REVIEWED:    ALLERGY:  Codeine and Lactose    MEDS: REVIEWED  Prior to Visit Medications    Medication Sig Taking? Authorizing Provider   insulin glargine (LANTUS SOLOSTAR) 100 UNIT/ML injection pen Inject 30 Units into the skin nightly Yes Kae Mcarthur MD   simvastatin (ZOCOR) 10 MG tablet Take 1 tablet by mouth nightly Yes Kae Mcarthur MD   fluticasone-salmeterol (ADVAIR DISKUS) 100-50 MCG/DOSE diskus inhaler Inhale 1 puff into the lungs 2 times daily Yes Kae Mcarthur MD   Insulin Pen Needle 31G X 6 MM MISC 1 each by Does not apply route nightly  Kae Mcarthur MD   naproxen (NAPROSYN) 500 MG tablet Take 1 tablet by mouth 2 times daily as needed for Pain  Kae Mcarthur MD   fluocinonide (LIDEX) 0.05 % cream Apply topically 2 times daily / PRN.   Kae Mcarthur MD   Cholecalciferol (VITAMIN D) 2000 units CAPS capsule Take 1 capsule by mouth daily  Kae Mcarthur MD   Blood Glucose Monitoring Suppl (FREESTYLE LITE) SHANE FOLLOW PACKAGE DIRECTIONS

## 2018-01-18 ENCOUNTER — OFFICE VISIT (OUTPATIENT)
Dept: ORTHOPEDIC SURGERY | Age: 28
End: 2018-01-18

## 2018-01-18 VITALS
HEIGHT: 65 IN | SYSTOLIC BLOOD PRESSURE: 120 MMHG | HEART RATE: 70 BPM | WEIGHT: 281.09 LBS | DIASTOLIC BLOOD PRESSURE: 70 MMHG | BODY MASS INDEX: 46.83 KG/M2

## 2018-01-18 DIAGNOSIS — Z79.4 TYPE 2 DIABETES MELLITUS WITH OTHER SPECIFIED COMPLICATION, WITH LONG-TERM CURRENT USE OF INSULIN (HCC): ICD-10-CM

## 2018-01-18 DIAGNOSIS — E11.69 TYPE 2 DIABETES MELLITUS WITH OTHER SPECIFIED COMPLICATION, WITH LONG-TERM CURRENT USE OF INSULIN (HCC): ICD-10-CM

## 2018-01-18 DIAGNOSIS — B35.1 ONYCHOMYCOSIS: Primary | ICD-10-CM

## 2018-01-18 PROCEDURE — G8417 CALC BMI ABV UP PARAM F/U: HCPCS | Performed by: PODIATRIST

## 2018-01-18 PROCEDURE — G8484 FLU IMMUNIZE NO ADMIN: HCPCS | Performed by: PODIATRIST

## 2018-01-18 PROCEDURE — 4004F PT TOBACCO SCREEN RCVD TLK: CPT | Performed by: PODIATRIST

## 2018-01-18 PROCEDURE — G8428 CUR MEDS NOT DOCUMENT: HCPCS | Performed by: PODIATRIST

## 2018-01-18 PROCEDURE — 3046F HEMOGLOBIN A1C LEVEL >9.0%: CPT | Performed by: PODIATRIST

## 2018-01-18 PROCEDURE — 99202 OFFICE O/P NEW SF 15 MIN: CPT | Performed by: PODIATRIST

## 2018-01-18 RX ORDER — SIMVASTATIN 10 MG
TABLET ORAL
COMMUNITY
End: 2018-03-26 | Stop reason: SDUPTHER

## 2018-01-18 RX ORDER — SIMVASTATIN 10 MG
10 TABLET ORAL
COMMUNITY
End: 2018-03-26 | Stop reason: SDUPTHER

## 2018-01-18 NOTE — PROGRESS NOTES
HISTORY OF PRESENT ILLNESS:  This is an initial visit for a 22-year-old insulin-dependent diabetic female with a chief complaint of thickened and discolored toenails. This has been present for at least several months. It is generally not painful. No    prescription medication or treatment has been attempted. PHYSICAL EXAM:  The nails are thickened, discolored, and dystrophic, with subungual debris and distal onycholysis, mainly on the right and left great toe. No signs of paronychia are noted. There is some mild pain on palpation directly over    each nail plate. The pedal pulses are palbable and the sensationis grossly intact. No signs of active tinea pedis infection is noted. ASSESSMENT:  Onychomycosis, IDDM without foot complications      PLAN:  I educated the patient on the pathology and its various treatment options. Diabetic foot screening was performed today which revealed adequate circulation and sensation. As for the toenail fungus, I do not recommend any treatment. I reassured the patient that this is a cosmetic issue and does not need to be treated. She agrees with the treatment plan.

## 2018-03-26 ENCOUNTER — OFFICE VISIT (OUTPATIENT)
Dept: INTERNAL MEDICINE CLINIC | Age: 28
End: 2018-03-26

## 2018-03-26 VITALS
DIASTOLIC BLOOD PRESSURE: 80 MMHG | HEIGHT: 65 IN | OXYGEN SATURATION: 99 % | TEMPERATURE: 98.9 F | SYSTOLIC BLOOD PRESSURE: 120 MMHG | HEART RATE: 86 BPM | WEIGHT: 278 LBS | BODY MASS INDEX: 46.32 KG/M2

## 2018-03-26 DIAGNOSIS — J45.40 MODERATE PERSISTENT ASTHMA WITHOUT COMPLICATION: ICD-10-CM

## 2018-03-26 DIAGNOSIS — E78.5 HYPERLIPIDEMIA LDL GOAL <100: ICD-10-CM

## 2018-03-26 DIAGNOSIS — E66.01 MORBID OBESITY WITH BMI OF 45.0-49.9, ADULT (HCC): ICD-10-CM

## 2018-03-26 DIAGNOSIS — F17.200 TOBACCO DEPENDENCE: ICD-10-CM

## 2018-03-26 DIAGNOSIS — L40.9 PSORIASIS: ICD-10-CM

## 2018-03-26 DIAGNOSIS — E55.9 VITAMIN D DEFICIENCY: ICD-10-CM

## 2018-03-26 LAB
ALBUMIN SERPL-MCNC: 4.6 G/DL (ref 3.4–5)
ALP BLD-CCNC: 45 U/L (ref 40–129)
ALT SERPL-CCNC: 29 U/L (ref 10–40)
ANION GAP SERPL CALCULATED.3IONS-SCNC: 14 MMOL/L (ref 3–16)
AST SERPL-CCNC: 25 U/L (ref 15–37)
BASOPHILS ABSOLUTE: 0.1 K/UL (ref 0–0.2)
BASOPHILS RELATIVE PERCENT: 0.8 %
BILIRUB SERPL-MCNC: 0.3 MG/DL (ref 0–1)
BILIRUBIN DIRECT: <0.2 MG/DL (ref 0–0.3)
BILIRUBIN, INDIRECT: NORMAL MG/DL (ref 0–1)
BUN BLDV-MCNC: 7 MG/DL (ref 7–20)
CALCIUM SERPL-MCNC: 9.4 MG/DL (ref 8.3–10.6)
CHLORIDE BLD-SCNC: 99 MMOL/L (ref 99–110)
CHOLESTEROL, TOTAL: 216 MG/DL (ref 0–199)
CO2: 26 MMOL/L (ref 21–32)
CREAT SERPL-MCNC: 0.7 MG/DL (ref 0.6–1.1)
CREATININE URINE: 156.3 MG/DL (ref 28–259)
EOSINOPHILS ABSOLUTE: 0.2 K/UL (ref 0–0.6)
EOSINOPHILS RELATIVE PERCENT: 2 %
GFR AFRICAN AMERICAN: >60
GFR NON-AFRICAN AMERICAN: >60
GLUCOSE BLD-MCNC: 127 MG/DL (ref 70–99)
GLUCOSE BLD-MCNC: 196 MG/DL
HBA1C MFR BLD: 7.4 %
HCT VFR BLD CALC: 41 % (ref 36–48)
HDLC SERPL-MCNC: 59 MG/DL (ref 40–60)
HEMOGLOBIN: 13.8 G/DL (ref 12–16)
LDL CHOLESTEROL CALCULATED: 104 MG/DL
LIPASE: 28 U/L (ref 13–60)
LYMPHOCYTES ABSOLUTE: 2.4 K/UL (ref 1–5.1)
LYMPHOCYTES RELATIVE PERCENT: 22.9 %
MCH RBC QN AUTO: 28.7 PG (ref 26–34)
MCHC RBC AUTO-ENTMCNC: 33.7 G/DL (ref 31–36)
MCV RBC AUTO: 85 FL (ref 80–100)
MICROALBUMIN UR-MCNC: 2.3 MG/DL
MICROALBUMIN/CREAT UR-RTO: 14.7 MG/G (ref 0–30)
MONOCYTES ABSOLUTE: 0.7 K/UL (ref 0–1.3)
MONOCYTES RELATIVE PERCENT: 6.3 %
NEUTROPHILS ABSOLUTE: 7.1 K/UL (ref 1.7–7.7)
NEUTROPHILS RELATIVE PERCENT: 68 %
PDW BLD-RTO: 13.8 % (ref 12.4–15.4)
PLATELET # BLD: 273 K/UL (ref 135–450)
PMV BLD AUTO: 8.9 FL (ref 5–10.5)
POTASSIUM SERPL-SCNC: 4.2 MMOL/L (ref 3.5–5.1)
RBC # BLD: 4.82 M/UL (ref 4–5.2)
SODIUM BLD-SCNC: 139 MMOL/L (ref 136–145)
TOTAL PROTEIN: 7.5 G/DL (ref 6.4–8.2)
TRIGL SERPL-MCNC: 264 MG/DL (ref 0–150)
VITAMIN D 25-HYDROXY: 23.3 NG/ML
VLDLC SERPL CALC-MCNC: 53 MG/DL
WBC # BLD: 10.4 K/UL (ref 4–11)

## 2018-03-26 PROCEDURE — 83036 HEMOGLOBIN GLYCOSYLATED A1C: CPT | Performed by: INTERNAL MEDICINE

## 2018-03-26 PROCEDURE — G8417 CALC BMI ABV UP PARAM F/U: HCPCS | Performed by: INTERNAL MEDICINE

## 2018-03-26 PROCEDURE — G8427 DOCREV CUR MEDS BY ELIG CLIN: HCPCS | Performed by: INTERNAL MEDICINE

## 2018-03-26 PROCEDURE — 82962 GLUCOSE BLOOD TEST: CPT | Performed by: INTERNAL MEDICINE

## 2018-03-26 PROCEDURE — G8484 FLU IMMUNIZE NO ADMIN: HCPCS | Performed by: INTERNAL MEDICINE

## 2018-03-26 PROCEDURE — 99214 OFFICE O/P EST MOD 30 MIN: CPT | Performed by: INTERNAL MEDICINE

## 2018-03-26 PROCEDURE — 3045F PR MOST RECENT HEMOGLOBIN A1C LEVEL 7.0-9.0%: CPT | Performed by: INTERNAL MEDICINE

## 2018-03-26 PROCEDURE — 99406 BEHAV CHNG SMOKING 3-10 MIN: CPT | Performed by: INTERNAL MEDICINE

## 2018-03-26 PROCEDURE — 1036F TOBACCO NON-USER: CPT | Performed by: INTERNAL MEDICINE

## 2018-03-26 RX ORDER — ALBUTEROL SULFATE 90 UG/1
2 AEROSOL, METERED RESPIRATORY (INHALATION) 4 TIMES DAILY PRN
Qty: 1 INHALER | Refills: 1 | Status: SHIPPED | OUTPATIENT
Start: 2018-03-26 | End: 2019-07-03 | Stop reason: SDUPTHER

## 2018-03-26 RX ORDER — SIMVASTATIN 10 MG
10 TABLET ORAL NIGHTLY
Qty: 30 TABLET | Refills: 3 | Status: SHIPPED | OUTPATIENT
Start: 2018-03-26 | End: 2018-05-11 | Stop reason: SDUPTHER

## 2018-03-26 RX ORDER — MULTIVIT-MIN/IRON/FOLIC ACID/K 18-600-40
1 CAPSULE ORAL DAILY
Qty: 30 CAPSULE | Refills: 3 | Status: SHIPPED | OUTPATIENT
Start: 2018-03-26 | End: 2018-06-07 | Stop reason: SDUPTHER

## 2018-03-26 RX ORDER — BUPROPION HYDROCHLORIDE 150 MG/1
150 TABLET ORAL EVERY MORNING
Qty: 30 TABLET | Refills: 3 | Status: SHIPPED | OUTPATIENT
Start: 2018-03-26 | End: 2018-06-07 | Stop reason: SDUPTHER

## 2018-03-26 ASSESSMENT — PATIENT HEALTH QUESTIONNAIRE - PHQ9
1. LITTLE INTEREST OR PLEASURE IN DOING THINGS: 0
2. FEELING DOWN, DEPRESSED OR HOPELESS: 0
SUM OF ALL RESPONSES TO PHQ9 QUESTIONS 1 & 2: 0
SUM OF ALL RESPONSES TO PHQ QUESTIONS 1-9: 0

## 2018-03-26 NOTE — PROGRESS NOTES
SUBJECTIVE:  Lisa Bruno is a 24 y.o. female [address] [place]   Chief Complaint   Patient presents with    Diabetes     3 month follow up jose    Hyperlipidemia    Other        PT HERE FOR EVAL      DM- TAKING INCREASED LANTUS DOSE. ? EXERCISE / DIET COMPLIANCE . HBS - ? 'S .  EYE EXAM 5/17  HLP -  ? MED COMPLIANCE. OCC MUSCLE CRAMPS / NO MUSCLE ACHES.  VIT D DEF - TAKING MED. LABS D/W PT  ASTHMA - DENIES WHEEZING, NO SOB, OCC INCREASED INHALER USE  STATES RECENT DIAGNOSIS OF PSORIASIS - ON TOPICAL MED PER DERM. STATES UNSURE OF NAME OF MED  + SMOKER -  CESSATION ADVISED. PT WILL LIKE MED  MORBID OBESITY - DIET / EXERCISE REVIEWED. WT  NOTED  LOW BACK PAIN - RESOLVED    DENIES CP, No SOB, No PALPITATIONS, No COUGH  No ABD PAIN, No N/V, No DIARRHEA, No CONSTIPATION, No MELENA, No HEMATOCHEZIA. No DYSURIA, No FREQ, No URGENCY, No HEMATURIA    PMH: REVIEWED    PSH: REVIEWED:    ALLERGY:  Codeine and Lactose    MEDS: REVIEWED  Prior to Visit Medications    Medication Sig Taking? Authorizing Provider   NONFORMULARY hydroxycut Yes Historical Provider, MD   insulin glargine (LANTUS SOLOSTAR) 100 UNIT/ML injection pen Inject 30 Units into the skin nightly Yes Myra Moura MD   fluticasone-salmeterol (ADVAIR DISKUS) 100-50 MCG/DOSE diskus inhaler Inhale 1 puff into the lungs 2 times daily Yes Myra Moura MD   naproxen (NAPROSYN) 500 MG tablet Take 1 tablet by mouth 2 times daily as needed for Pain Yes Myra Moura MD   Insulin Pen Needle 31G X 6 MM MISC 1 each by Does not apply route nightly Yes Myra Moura MD   fluocinonide (LIDEX) 0.05 % cream Apply topically 2 times daily / PRN. Yes Myra Moura MD   Blood Glucose Monitoring Suppl (FREESTYLE LITE) [address] DIRECTIONS Yes Myra Moura MD   terbinafine (ATHLETES FOOT) 1 % cream Apply topically 2 times daily.  Yes Myra Moura MD   glucose blood VI test strips (ONE TOUCH TEST STRIPS) strip 1 each by In Vitro route 2 times daily As needed. Yes Rob Higuera MD   albuterol sulfate HFA (VENTOLIN HFA) 108 (90 BASE) MCG/ACT inhaler Inhale 2 puffs into the lungs 4 times daily as needed for Wheezing Yes Ofelia Larry DO   simvastatin (ZOCOR) 10 MG tablet Take 1 tablet by mouth nightly  Rob Higuera MD   Cholecalciferol (VITAMIN D) 2000 units CAPS capsule Take 1 capsule by mouth daily  Rob Higuera MD       OB/GYN: LMP 3/2018. DENIES PREGNANCY    ROS: COMPREHENSIVE ROS AS IN HX, REST -VE  History obtained from chart review and the patient    OBJECTIVE:   NURSING NOTE AND VITALS REVIEWED  /88 (Site: Right Arm, Position: Sitting, Cuff Size: Large Adult)   Pulse 86   Temp 98.9 °F (37.2 °C) (Oral)   Ht 5' 5\" (1.651 m)   Wt 278 lb (126.1 kg)   LMP 03/22/2018   SpO2 99%   BMI 46.26 kg/m²     NO ACUTE DISTRESS  + NICOTINE BREATH    REPEAT BP:  120/80 (LT), NO ORTHOSTASIS     Body mass index is 46.26 kg/m². HEENT: NO PALLOR, ANICTERIC, PERRLA, EOMI, NO CONJUNCTIVAL ERYTHEMA,                 NO SINUS TENDERNESS  NECK:  SUPPLE, TRACHEA MIDLINE, NT, NO JVD, NO CB, NO LA, NO TM, NO STIFFNESS  CHEST: RESPY EFFORT NL, GOOD AE, NO W/R/C  HEART: S1S2+ REG, NO M/G/R  ABD: OBESE, SOFT, NT, NO HSM, BS+  EXT: NO EDEMA, NT, PULSES +. MARI'S -VE  NEURO: ALERT AND ORIENTED X 3, NO MENINGEAL SIGNS, NO TREMORS, NL GAIT, NO FOCAL DEFICITS  PSYCH: FAIRLY GOOD AFFECT  BACK: NT, NO ROM, NO CVA TENDERNESS    PREVIOUS LABS REVIEWED AND D/W PT    ACCUCHECK: 196    POC HBA1C: 7.4      ASSESSMENT / PLAN:    1. DM (diabetes mellitus), secondary uncontrolled ([address]  COUNSELLED. UNCONTROLLED. INCREASE LANTUS TO 34 UNITS DAILY  MED COMPLIANCE ADVISED  DIET/ EXERCISE ADVISED. MONITOR. GOAL FBS 80- 120, HBA1C < 7  ADVISED ON HOME BLOOD SUGAR MONITORING  F/U LABS  MAKE CHANGES AS NEEDED. 2. Hyperlipidemia LDL goal <100  COUNSELLED. ADVISED MED COMPLIANCE - ZOCOR 10 MG REFILLED  ADVISED LOW FAT / CHOL DIET/ EXERCISE.  MONITOR.  F/U LABS  GOALS D/W

## 2018-05-11 DIAGNOSIS — E78.5 HYPERLIPIDEMIA LDL GOAL <100: ICD-10-CM

## 2018-05-14 RX ORDER — SIMVASTATIN 10 MG
10 TABLET ORAL NIGHTLY
Qty: 30 TABLET | Refills: 3 | Status: SHIPPED | OUTPATIENT
Start: 2018-05-14 | End: 2018-08-13 | Stop reason: SDUPTHER

## 2018-06-07 ENCOUNTER — OFFICE VISIT (OUTPATIENT)
Dept: INTERNAL MEDICINE CLINIC | Age: 28
End: 2018-06-07

## 2018-06-07 VITALS
DIASTOLIC BLOOD PRESSURE: 80 MMHG | OXYGEN SATURATION: 97 % | SYSTOLIC BLOOD PRESSURE: 130 MMHG | HEIGHT: 65 IN | HEART RATE: 77 BPM | WEIGHT: 278.6 LBS | BODY MASS INDEX: 46.42 KG/M2

## 2018-06-07 DIAGNOSIS — E78.2 MIXED HYPERLIPIDEMIA: ICD-10-CM

## 2018-06-07 DIAGNOSIS — F17.200 TOBACCO DEPENDENCE: ICD-10-CM

## 2018-06-07 DIAGNOSIS — N94.6 DYSMENORRHEA: ICD-10-CM

## 2018-06-07 DIAGNOSIS — J45.40 MODERATE PERSISTENT ASTHMA WITHOUT COMPLICATION: ICD-10-CM

## 2018-06-07 DIAGNOSIS — B35.1 ONYCHOMYCOSIS: ICD-10-CM

## 2018-06-07 DIAGNOSIS — E66.01 MORBID OBESITY WITH BMI OF 45.0-49.9, ADULT (HCC): ICD-10-CM

## 2018-06-07 DIAGNOSIS — E55.9 VITAMIN D DEFICIENCY: ICD-10-CM

## 2018-06-07 LAB — GLUCOSE BLD-MCNC: 127 MG/DL

## 2018-06-07 PROCEDURE — 82962 GLUCOSE BLOOD TEST: CPT | Performed by: INTERNAL MEDICINE

## 2018-06-07 PROCEDURE — G8427 DOCREV CUR MEDS BY ELIG CLIN: HCPCS | Performed by: INTERNAL MEDICINE

## 2018-06-07 PROCEDURE — G8417 CALC BMI ABV UP PARAM F/U: HCPCS | Performed by: INTERNAL MEDICINE

## 2018-06-07 PROCEDURE — 99406 BEHAV CHNG SMOKING 3-10 MIN: CPT | Performed by: INTERNAL MEDICINE

## 2018-06-07 PROCEDURE — 1036F TOBACCO NON-USER: CPT | Performed by: INTERNAL MEDICINE

## 2018-06-07 PROCEDURE — 2022F DILAT RTA XM EVC RTNOPTHY: CPT | Performed by: INTERNAL MEDICINE

## 2018-06-07 PROCEDURE — 3045F PR MOST RECENT HEMOGLOBIN A1C LEVEL 7.0-9.0%: CPT | Performed by: INTERNAL MEDICINE

## 2018-06-07 PROCEDURE — 99214 OFFICE O/P EST MOD 30 MIN: CPT | Performed by: INTERNAL MEDICINE

## 2018-06-07 RX ORDER — ASPIRIN 81 MG/1
81 TABLET ORAL DAILY
Qty: 30 TABLET | Refills: 3 | Status: SHIPPED | OUTPATIENT
Start: 2018-06-07 | End: 2020-02-05

## 2018-06-07 RX ORDER — NICOTINE 21 MG/24HR
1 PATCH, TRANSDERMAL 24 HOURS TRANSDERMAL EVERY 24 HOURS
Qty: 30 PATCH | Refills: 1 | Status: SHIPPED | OUTPATIENT
Start: 2018-06-07 | End: 2018-09-22

## 2018-06-07 RX ORDER — TETANUS AND DIPHTHERIA TOXOIDS ADSORBED 2; 2 [LF]/.5ML; [LF]/.5ML
0.5 INJECTION INTRAMUSCULAR ONCE
Qty: 0.5 ML | Refills: 0 | Status: CANCELLED | OUTPATIENT
Start: 2018-06-07 | End: 2018-06-07

## 2018-06-07 RX ORDER — PRENATAL VIT 91/IRON/FOLIC/DHA 28-975-200
COMBINATION PACKAGE (EA) ORAL
Qty: 60 G | Refills: 2 | Status: SHIPPED | OUTPATIENT
Start: 2018-06-07 | End: 2018-09-22

## 2018-06-07 RX ORDER — BUPROPION HYDROCHLORIDE 150 MG/1
150 TABLET ORAL EVERY MORNING
Qty: 30 TABLET | Refills: 3 | Status: SHIPPED | OUTPATIENT
Start: 2018-06-07 | End: 2018-08-13 | Stop reason: SDUPTHER

## 2018-06-07 RX ORDER — MULTIVIT-MIN/IRON/FOLIC ACID/K 18-600-40
1 CAPSULE ORAL DAILY
Qty: 30 CAPSULE | Refills: 3 | Status: SHIPPED | OUTPATIENT
Start: 2018-06-07 | End: 2018-08-13 | Stop reason: SDUPTHER

## 2018-06-07 RX ORDER — NAPROXEN 500 MG/1
500 TABLET ORAL 2 TIMES DAILY PRN
Qty: 60 TABLET | Refills: 0 | Status: SHIPPED | OUTPATIENT
Start: 2018-06-07 | End: 2019-01-19

## 2018-06-19 ENCOUNTER — TELEPHONE (OUTPATIENT)
Dept: INTERNAL MEDICINE CLINIC | Age: 28
End: 2018-06-19

## 2018-08-13 ENCOUNTER — OFFICE VISIT (OUTPATIENT)
Dept: INTERNAL MEDICINE CLINIC | Age: 28
End: 2018-08-13

## 2018-08-13 VITALS
HEIGHT: 65 IN | BODY MASS INDEX: 43.49 KG/M2 | DIASTOLIC BLOOD PRESSURE: 80 MMHG | HEART RATE: 73 BPM | WEIGHT: 261 LBS | OXYGEN SATURATION: 98 % | SYSTOLIC BLOOD PRESSURE: 120 MMHG

## 2018-08-13 DIAGNOSIS — E66.01 MORBID OBESITY WITH BMI OF 40.0-44.9, ADULT (HCC): ICD-10-CM

## 2018-08-13 DIAGNOSIS — J01.80 OTHER ACUTE SINUSITIS, RECURRENCE NOT SPECIFIED: ICD-10-CM

## 2018-08-13 DIAGNOSIS — N94.6 DYSMENORRHEA: ICD-10-CM

## 2018-08-13 DIAGNOSIS — E55.9 VITAMIN D DEFICIENCY: ICD-10-CM

## 2018-08-13 DIAGNOSIS — F17.200 TOBACCO DEPENDENCE: ICD-10-CM

## 2018-08-13 DIAGNOSIS — E78.2 MIXED HYPERLIPIDEMIA: ICD-10-CM

## 2018-08-13 DIAGNOSIS — J45.40 MODERATE PERSISTENT ASTHMA WITHOUT COMPLICATION: ICD-10-CM

## 2018-08-13 DIAGNOSIS — Z79.4 TYPE 2 DIABETES MELLITUS WITH OTHER SPECIFIED COMPLICATION, WITH LONG-TERM CURRENT USE OF INSULIN (HCC): Primary | ICD-10-CM

## 2018-08-13 DIAGNOSIS — E11.69 TYPE 2 DIABETES MELLITUS WITH OTHER SPECIFIED COMPLICATION, WITH LONG-TERM CURRENT USE OF INSULIN (HCC): Primary | ICD-10-CM

## 2018-08-13 LAB
A/G RATIO: 1.4 (ref 1.1–2.2)
ALBUMIN SERPL-MCNC: 4.7 G/DL (ref 3.4–5)
ALP BLD-CCNC: 47 U/L (ref 40–129)
ALT SERPL-CCNC: 21 U/L (ref 10–40)
ANION GAP SERPL CALCULATED.3IONS-SCNC: 19 MMOL/L (ref 3–16)
AST SERPL-CCNC: 23 U/L (ref 15–37)
BILIRUB SERPL-MCNC: 0.3 MG/DL (ref 0–1)
BUN BLDV-MCNC: 10 MG/DL (ref 7–20)
CALCIUM SERPL-MCNC: 10.4 MG/DL (ref 8.3–10.6)
CHLORIDE BLD-SCNC: 101 MMOL/L (ref 99–110)
CHOLESTEROL, TOTAL: 190 MG/DL (ref 0–199)
CO2: 21 MMOL/L (ref 21–32)
CREAT SERPL-MCNC: 0.9 MG/DL (ref 0.6–1.1)
CREATININE URINE: 230.9 MG/DL (ref 28–259)
GFR AFRICAN AMERICAN: >60
GFR NON-AFRICAN AMERICAN: >60
GLOBULIN: 3.4 G/DL
GLUCOSE BLD-MCNC: 111 MG/DL
GLUCOSE BLD-MCNC: 111 MG/DL (ref 70–99)
HBA1C MFR BLD: 6.3 %
HDLC SERPL-MCNC: 63 MG/DL (ref 40–60)
LDL CHOLESTEROL CALCULATED: 101 MG/DL
MICROALBUMIN UR-MCNC: 2 MG/DL
MICROALBUMIN/CREAT UR-RTO: 8.7 MG/G (ref 0–30)
POTASSIUM SERPL-SCNC: 4.5 MMOL/L (ref 3.5–5.1)
SODIUM BLD-SCNC: 141 MMOL/L (ref 136–145)
TOTAL PROTEIN: 8.1 G/DL (ref 6.4–8.2)
TRIGL SERPL-MCNC: 132 MG/DL (ref 0–150)
TSH REFLEX: 1.8 UIU/ML (ref 0.27–4.2)
VITAMIN D 25-HYDROXY: 22.6 NG/ML
VLDLC SERPL CALC-MCNC: 26 MG/DL

## 2018-08-13 PROCEDURE — 83036 HEMOGLOBIN GLYCOSYLATED A1C: CPT | Performed by: INTERNAL MEDICINE

## 2018-08-13 PROCEDURE — 2022F DILAT RTA XM EVC RTNOPTHY: CPT | Performed by: INTERNAL MEDICINE

## 2018-08-13 PROCEDURE — G8417 CALC BMI ABV UP PARAM F/U: HCPCS | Performed by: INTERNAL MEDICINE

## 2018-08-13 PROCEDURE — G8427 DOCREV CUR MEDS BY ELIG CLIN: HCPCS | Performed by: INTERNAL MEDICINE

## 2018-08-13 PROCEDURE — 82962 GLUCOSE BLOOD TEST: CPT | Performed by: INTERNAL MEDICINE

## 2018-08-13 PROCEDURE — 99406 BEHAV CHNG SMOKING 3-10 MIN: CPT | Performed by: INTERNAL MEDICINE

## 2018-08-13 PROCEDURE — 3044F HG A1C LEVEL LT 7.0%: CPT | Performed by: INTERNAL MEDICINE

## 2018-08-13 PROCEDURE — 1036F TOBACCO NON-USER: CPT | Performed by: INTERNAL MEDICINE

## 2018-08-13 PROCEDURE — 99214 OFFICE O/P EST MOD 30 MIN: CPT | Performed by: INTERNAL MEDICINE

## 2018-08-13 RX ORDER — BUPROPION HYDROCHLORIDE 150 MG/1
150 TABLET ORAL EVERY MORNING
Qty: 30 TABLET | Refills: 3 | Status: SHIPPED | OUTPATIENT
Start: 2018-08-13 | End: 2019-07-03 | Stop reason: SDUPTHER

## 2018-08-13 RX ORDER — MULTIVIT-MIN/IRON/FOLIC ACID/K 18-600-40
1 CAPSULE ORAL DAILY
Qty: 30 CAPSULE | Refills: 3 | Status: SHIPPED | OUTPATIENT
Start: 2018-08-13 | End: 2019-07-03 | Stop reason: SDUPTHER

## 2018-08-13 RX ORDER — AMOXICILLIN AND CLAVULANATE POTASSIUM 875; 125 MG/1; MG/1
1 TABLET, FILM COATED ORAL
COMMUNITY
Start: 2018-08-12 | End: 2018-08-22

## 2018-08-13 RX ORDER — IBUPROFEN 600 MG/1
600 TABLET ORAL
COMMUNITY
Start: 2018-08-12 | End: 2018-09-22

## 2018-08-13 RX ORDER — SIMVASTATIN 10 MG
10 TABLET ORAL NIGHTLY
Qty: 30 TABLET | Refills: 3 | Status: SHIPPED | OUTPATIENT
Start: 2018-08-13 | End: 2019-07-03 | Stop reason: SDUPTHER

## 2018-08-13 ASSESSMENT — PATIENT HEALTH QUESTIONNAIRE - PHQ9
1. LITTLE INTEREST OR PLEASURE IN DOING THINGS: 0
SUM OF ALL RESPONSES TO PHQ QUESTIONS 1-9: 0
SUM OF ALL RESPONSES TO PHQ QUESTIONS 1-9: 0
2. FEELING DOWN, DEPRESSED OR HOPELESS: 0
SUM OF ALL RESPONSES TO PHQ9 QUESTIONS 1 & 2: 0

## 2018-08-13 NOTE — PROGRESS NOTES
SUBJECTIVE:  Dulce Maria Smith is a 24 y.o. female HERE FOR   Chief Complaint   Patient presents with    Diabetes        PT HERE FOR EVAL    DM- TAKING MED. ? EXERCISE / DIET COMPLIANCE . HBS  'S .  EYE EXAM > 1 YR  HLP - TAKING MED. NO MUSCLE CRAMPS / NO MUSCLE ACHES.  VIT D DEF - TAKING MED. LABS D/W PT  ASTHMA - OCC WHEEZING, NO SOB, NO INCREASED INHALER USE  MENSTRUAL CRAMPS - MED ELPING. LMP 8/3/18  + SMOKER -   CESSATION ADVISED. SEEN IN ER YESTERDAY FOR HA - GIVEN MED FOR SINUSITIS - + SINUS PRESSURE. NO F/C  MORBID OBESITY - DIET / EXERCISE REVIEWED. WT LOSS NOTED        DENIES CP, No SOB, No PALPITATIONS, No COUGH  No ABD PAIN, No N/V, No DIARRHEA, No CONSTIPATION, No MELENA, No HEMATOCHEZIA. No DYSURIA, NoFREQ, No URGENCY,NO HEMATURIA    PMH: REVIEWED AND UPDATED TODAY    PSH: REVIEWED AND UPDATED TODAY    SOCIAL HX: REVIEWED AND UPDATED TODAY    ALLERGY:  Codeine and Lactose    MEDS: REVIEWED  Prior to Visit Medications    Medication Sig Taking? Authorizing Provider   amoxicillin-clavulanate (AUGMENTIN) 875-125 MG per tablet Take 1 tablet by mouth Yes Historical Provider, MD   ibuprofen (ADVIL;MOTRIN) 600 MG tablet Take 600 mg by mouth Yes Historical Provider, MD   insulin glargine (LANTUS SOLOSTAR) 100 UNIT/ML injection pen Inject 34 Units into the skin nightly Yes Rock Zeng MD   fluticasone-salmeterol (ADVAIR DISKUS) 100-50 MCG/DOSE diskus inhaler Inhale 1 puff into the lungs 2 times daily Yes Rock Zeng MD   Cholecalciferol (VITAMIN D) 2000 units CAPS capsule Take 1 capsule by mouth daily Yes Rock Zeng MD   nicotine (NICODERM CQ) 14 MG/24HR Place 1 patch onto the skin every 24 hours  Rock Zeng MD   naproxen (NAPROSYN) 500 MG tablet Take 1 tablet by mouth 2 times daily as needed for Pain Yes Rock Zeng MD   aspirin EC 81 MG EC tablet Take 1 tablet by mouth daily Yes Rock Zeng MD   terbinafine (ATHLETES FOOT) 1 % cream Apply topically 2 times daily.  Yes Exercise    I have instructed Atul to complete a self tracking handout on Blood Sugars , Weights and Smoking and instructed them to bring it with them to her next appointment. Discussed use, benefit, and side effects of prescribed medications. Barriers to medication compliance addressed. All patient questions answered. Pt voiced understanding.              MEDICATION SIDE EFFECTS D/W PATIENT    PT STABLE AT TIME OF D/C.    RETURN TO CLINIC WITHIN 3 MONTHS / PRN    FOLLOW UP FOR FASTING LABS, EYE EXAM

## 2018-09-22 ENCOUNTER — HOSPITAL ENCOUNTER (EMERGENCY)
Age: 28
Discharge: HOME | End: 2018-09-22
Attending: EMERGENCY MEDICINE
Payer: COMMERCIAL

## 2018-09-22 VITALS
OXYGEN SATURATION: 100 % | SYSTOLIC BLOOD PRESSURE: 136 MMHG | WEIGHT: 276.8 LBS | HEART RATE: 70 BPM | HEIGHT: 65 IN | TEMPERATURE: 98.2 F | DIASTOLIC BLOOD PRESSURE: 70 MMHG | BODY MASS INDEX: 46.12 KG/M2 | RESPIRATION RATE: 16 BRPM

## 2018-09-22 DIAGNOSIS — N61.1 ABSCESS OF BREAST, RIGHT: Primary | ICD-10-CM

## 2018-09-22 PROCEDURE — 99282 EMERGENCY DEPT VISIT SF MDM: CPT

## 2018-09-22 PROCEDURE — 4500000022 HC ED LEVEL 2 PROCEDURE

## 2018-09-22 RX ORDER — SULFAMETHOXAZOLE AND TRIMETHOPRIM 800; 160 MG/1; MG/1
1 TABLET ORAL 2 TIMES DAILY
Qty: 20 TABLET | Refills: 0 | Status: SHIPPED | OUTPATIENT
Start: 2018-09-22 | End: 2018-10-02

## 2018-09-22 RX ORDER — IBUPROFEN 600 MG/1
600 TABLET ORAL EVERY 6 HOURS PRN
Qty: 30 TABLET | Refills: 0 | Status: SHIPPED | OUTPATIENT
Start: 2018-09-22 | End: 2019-01-19

## 2018-09-22 RX ORDER — LIDOCAINE HYDROCHLORIDE AND EPINEPHRINE 10; 10 MG/ML; UG/ML
20 INJECTION, SOLUTION INFILTRATION; PERINEURAL ONCE
Status: DISCONTINUED | OUTPATIENT
Start: 2018-09-22 | End: 2018-09-22 | Stop reason: HOSPADM

## 2018-09-22 ASSESSMENT — PAIN DESCRIPTION - PAIN TYPE: TYPE: ACUTE PAIN

## 2018-09-22 ASSESSMENT — PAIN DESCRIPTION - DESCRIPTORS: DESCRIPTORS: ACHING

## 2018-09-22 ASSESSMENT — PAIN DESCRIPTION - LOCATION: LOCATION: BREAST

## 2018-09-22 ASSESSMENT — PAIN SCALES - GENERAL: PAINLEVEL_OUTOF10: 9

## 2018-09-22 ASSESSMENT — PAIN DESCRIPTION - ORIENTATION: ORIENTATION: RIGHT

## 2018-09-22 NOTE — ED NOTES
Patient given discharge instructions with return verbalization including two prescriptions. . Emphasis on f/u, to return with worsening s/s. Patient ambulated to lobby with steady gait.      Edilia Mcneil RN  09/22/18 0013

## 2019-01-19 ENCOUNTER — HOSPITAL ENCOUNTER (EMERGENCY)
Age: 29
Discharge: HOME | End: 2019-01-19
Attending: EMERGENCY MEDICINE
Payer: COMMERCIAL

## 2019-01-19 VITALS
DIASTOLIC BLOOD PRESSURE: 98 MMHG | OXYGEN SATURATION: 100 % | TEMPERATURE: 98.4 F | SYSTOLIC BLOOD PRESSURE: 161 MMHG | RESPIRATION RATE: 18 BRPM | HEART RATE: 77 BPM

## 2019-01-19 DIAGNOSIS — R03.0 ELEVATED BLOOD PRESSURE READING: ICD-10-CM

## 2019-01-19 DIAGNOSIS — N76.0 BACTERIAL VAGINOSIS: ICD-10-CM

## 2019-01-19 DIAGNOSIS — S39.012A STRAIN OF LUMBAR REGION, INITIAL ENCOUNTER: Primary | ICD-10-CM

## 2019-01-19 DIAGNOSIS — B96.89 BACTERIAL VAGINOSIS: ICD-10-CM

## 2019-01-19 LAB
BACTERIA WET PREP: ABNORMAL
BILIRUBIN URINE: NEGATIVE
BLOOD, URINE: NEGATIVE
CLARITY: ABNORMAL
CLUE CELLS: ABNORMAL
COLOR: YELLOW
EPITHELIAL CELLS WET PREP: ABNORMAL
GLUCOSE URINE: NEGATIVE MG/DL
HCG(URINE) PREGNANCY TEST: NEGATIVE
KETONES, URINE: NEGATIVE MG/DL
LEUKOCYTE ESTERASE, URINE: NEGATIVE
MICROSCOPIC EXAMINATION: ABNORMAL
NITRITE, URINE: NEGATIVE
PH UA: 6
PROTEIN UA: NEGATIVE MG/DL
RBC WET PREP: ABNORMAL
SOURCE WET PREP: ABNORMAL
SPECIFIC GRAVITY UA: 1.02
TRICHOMONAS PREP: ABNORMAL
URINE TYPE: ABNORMAL
UROBILINOGEN, URINE: 0.2 E.U./DL
WBC WET PREP: ABNORMAL
YEAST WET PREP: ABNORMAL

## 2019-01-19 PROCEDURE — 87591 N.GONORRHOEAE DNA AMP PROB: CPT

## 2019-01-19 PROCEDURE — 81003 URINALYSIS AUTO W/O SCOPE: CPT

## 2019-01-19 PROCEDURE — 87210 SMEAR WET MOUNT SALINE/INK: CPT

## 2019-01-19 PROCEDURE — 84703 CHORIONIC GONADOTROPIN ASSAY: CPT

## 2019-01-19 PROCEDURE — 6360000002 HC RX W HCPCS: Performed by: EMERGENCY MEDICINE

## 2019-01-19 PROCEDURE — 87491 CHLMYD TRACH DNA AMP PROBE: CPT

## 2019-01-19 PROCEDURE — 96372 THER/PROPH/DIAG INJ SC/IM: CPT

## 2019-01-19 PROCEDURE — 99283 EMERGENCY DEPT VISIT LOW MDM: CPT

## 2019-01-19 RX ORDER — KETOROLAC TROMETHAMINE 30 MG/ML
60 INJECTION, SOLUTION INTRAMUSCULAR; INTRAVENOUS ONCE
Status: COMPLETED | OUTPATIENT
Start: 2019-01-19 | End: 2019-01-19

## 2019-01-19 RX ORDER — METRONIDAZOLE 500 MG/1
500 TABLET ORAL 2 TIMES DAILY
Qty: 14 TABLET | Refills: 0 | Status: SHIPPED | OUTPATIENT
Start: 2019-01-19 | End: 2019-01-26

## 2019-01-19 RX ORDER — METHOCARBAMOL 750 MG/1
750 TABLET, FILM COATED ORAL 3 TIMES DAILY PRN
Qty: 30 TABLET | Refills: 0 | Status: SHIPPED | OUTPATIENT
Start: 2019-01-19 | End: 2019-01-29

## 2019-01-19 RX ORDER — IBUPROFEN 600 MG/1
600 TABLET ORAL EVERY 8 HOURS PRN
Qty: 20 TABLET | Refills: 1 | Status: SHIPPED | OUTPATIENT
Start: 2019-01-19 | End: 2019-10-09

## 2019-01-19 RX ADMIN — KETOROLAC TROMETHAMINE 60 MG: 30 INJECTION, SOLUTION INTRAMUSCULAR at 17:48

## 2019-01-19 ASSESSMENT — PAIN DESCRIPTION - LOCATION: LOCATION: BACK;PELVIS

## 2019-01-19 ASSESSMENT — PAIN SCALES - GENERAL
PAINLEVEL_OUTOF10: 9
PAINLEVEL_OUTOF10: 10

## 2019-01-19 ASSESSMENT — PAIN DESCRIPTION - DESCRIPTORS: DESCRIPTORS: SHOOTING

## 2019-01-19 ASSESSMENT — PAIN DESCRIPTION - PAIN TYPE: TYPE: ACUTE PAIN

## 2019-01-19 ASSESSMENT — PAIN DESCRIPTION - FREQUENCY: FREQUENCY: CONTINUOUS

## 2019-01-22 LAB
C TRACH DNA GENITAL QL NAA+PROBE: NEGATIVE
N. GONORRHOEAE DNA: NEGATIVE

## 2019-07-03 ENCOUNTER — OFFICE VISIT (OUTPATIENT)
Dept: INTERNAL MEDICINE CLINIC | Age: 29
End: 2019-07-03
Payer: COMMERCIAL

## 2019-07-03 VITALS
SYSTOLIC BLOOD PRESSURE: 118 MMHG | OXYGEN SATURATION: 97 % | DIASTOLIC BLOOD PRESSURE: 80 MMHG | HEIGHT: 66 IN | WEIGHT: 263.8 LBS | BODY MASS INDEX: 42.4 KG/M2 | HEART RATE: 70 BPM | TEMPERATURE: 98 F

## 2019-07-03 DIAGNOSIS — F17.210 SMOKES CIGARETTES: ICD-10-CM

## 2019-07-03 DIAGNOSIS — E66.01 MORBID OBESITY WITH BMI OF 40.0-44.9, ADULT (HCC): ICD-10-CM

## 2019-07-03 DIAGNOSIS — N97.9 FEMALE FERTILITY PROBLEM: ICD-10-CM

## 2019-07-03 DIAGNOSIS — J45.40 MODERATE PERSISTENT ASTHMA WITHOUT COMPLICATION: ICD-10-CM

## 2019-07-03 DIAGNOSIS — Z79.4 TYPE 2 DIABETES MELLITUS WITH OTHER SPECIFIED COMPLICATION, WITH LONG-TERM CURRENT USE OF INSULIN (HCC): Primary | ICD-10-CM

## 2019-07-03 DIAGNOSIS — J30.9 ALLERGIC SINUSITIS: ICD-10-CM

## 2019-07-03 DIAGNOSIS — E55.9 VITAMIN D DEFICIENCY: ICD-10-CM

## 2019-07-03 DIAGNOSIS — E78.2 MIXED HYPERLIPIDEMIA: ICD-10-CM

## 2019-07-03 DIAGNOSIS — E11.69 TYPE 2 DIABETES MELLITUS WITH OTHER SPECIFIED COMPLICATION, WITH LONG-TERM CURRENT USE OF INSULIN (HCC): Primary | ICD-10-CM

## 2019-07-03 LAB
CHP ED QC CHECK: NORMAL
GLUCOSE BLD-MCNC: 126 MG/DL
HBA1C MFR BLD: 6.8 %

## 2019-07-03 PROCEDURE — 3044F HG A1C LEVEL LT 7.0%: CPT | Performed by: INTERNAL MEDICINE

## 2019-07-03 PROCEDURE — 82962 GLUCOSE BLOOD TEST: CPT | Performed by: INTERNAL MEDICINE

## 2019-07-03 PROCEDURE — 99214 OFFICE O/P EST MOD 30 MIN: CPT | Performed by: INTERNAL MEDICINE

## 2019-07-03 PROCEDURE — 83036 HEMOGLOBIN GLYCOSYLATED A1C: CPT | Performed by: INTERNAL MEDICINE

## 2019-07-03 PROCEDURE — 99406 BEHAV CHNG SMOKING 3-10 MIN: CPT | Performed by: INTERNAL MEDICINE

## 2019-07-03 PROCEDURE — 2022F DILAT RTA XM EVC RTNOPTHY: CPT | Performed by: INTERNAL MEDICINE

## 2019-07-03 PROCEDURE — G8417 CALC BMI ABV UP PARAM F/U: HCPCS | Performed by: INTERNAL MEDICINE

## 2019-07-03 PROCEDURE — 1036F TOBACCO NON-USER: CPT | Performed by: INTERNAL MEDICINE

## 2019-07-03 PROCEDURE — G8427 DOCREV CUR MEDS BY ELIG CLIN: HCPCS | Performed by: INTERNAL MEDICINE

## 2019-07-03 RX ORDER — SIMVASTATIN 10 MG
10 TABLET ORAL NIGHTLY
Qty: 30 TABLET | Refills: 3 | Status: SHIPPED | OUTPATIENT
Start: 2019-07-03 | End: 2020-02-05

## 2019-07-03 RX ORDER — ALBUTEROL SULFATE 90 UG/1
2 AEROSOL, METERED RESPIRATORY (INHALATION) 4 TIMES DAILY PRN
Qty: 1 INHALER | Refills: 1 | Status: SHIPPED | OUTPATIENT
Start: 2019-07-03 | End: 2020-11-12 | Stop reason: SDUPTHER

## 2019-07-03 RX ORDER — BUPROPION HYDROCHLORIDE 150 MG/1
150 TABLET ORAL EVERY MORNING
Qty: 30 TABLET | Refills: 3 | Status: SHIPPED | OUTPATIENT
Start: 2019-07-03 | End: 2020-02-05

## 2019-07-03 RX ORDER — MULTIVIT-MIN/IRON/FOLIC ACID/K 18-600-40
1 CAPSULE ORAL DAILY
Qty: 30 CAPSULE | Refills: 3 | Status: SHIPPED | OUTPATIENT
Start: 2019-07-03 | End: 2020-11-12 | Stop reason: ALTCHOICE

## 2019-07-03 ASSESSMENT — PATIENT HEALTH QUESTIONNAIRE - PHQ9
1. LITTLE INTEREST OR PLEASURE IN DOING THINGS: 0
SUM OF ALL RESPONSES TO PHQ QUESTIONS 1-9: 0
2. FEELING DOWN, DEPRESSED OR HOPELESS: 0
SUM OF ALL RESPONSES TO PHQ9 QUESTIONS 1 & 2: 0
SUM OF ALL RESPONSES TO PHQ QUESTIONS 1-9: 0

## 2019-07-03 NOTE — PROGRESS NOTES
tablet by mouth nightly Yes Chico Bergeron MD   aspirin EC 81 MG EC tablet Take 1 tablet by mouth daily Yes Chico Bergeron MD   albuterol sulfate HFA (VENTOLIN HFA) 108 (90 Base) MCG/ACT inhaler Inhale 2 puffs into the lungs 4 times daily as needed for Wheezing Yes Chico Bergeron MD   NONFORMULARY hydroxycut Yes Historical Provider, MD   Insulin Pen Needle 31G X 6 MM MISC 1 each by Does not apply route nightly Yes Chico Bergeron MD   Blood Glucose Monitoring Suppl (Ulices Lopez) Jeannine Richey Yes Chico Bergeron MD   glucose blood VI test strips (ONE TOUCH TEST STRIPS) strip 1 each by In Vitro route 2 times daily As needed. Yes Chico Bergeron MD       ROS: COMPREHENSIVE ROS AS IN HX, REST -VE  History obtained from chart review and the patient    OBJECTIVE:   NURSING NOTE AND VITALS REVIEWED  /80 (Site: Left Upper Arm, Position: Sitting, Cuff Size: Large Adult)   Pulse 70   Temp 98 °F (36.7 °C)   Ht 5' 6\" (1.676 m)   Wt 263 lb 12.8 oz (119.7 kg)   LMP 06/21/2019   SpO2 97%   Breastfeeding? No   BMI 42.58 kg/m²     NO ACUTE DISTRESS    REPEAT BP: 118/80 (LT), NO ORTHOSTASIS    Body mass index is 42.58 kg/m². HEENT: NO PALLOR, ANICTERIC, PERRLA, EOMI, NO CONJUNCTIVAL ERYTHEMA,                 NO SINUS TENDERNESS  NECK:  SUPPLE, TRACHEA MIDLINE, NT, NO JVD, NO CB, NO LA, NO TM, NO STIFFNESS  CHEST: RESPY EFFORT NL, GOOD AE, FEW EXP WHEEZE, NO R/C  HEART: S1S2+ REG, NO M/G/R  ABD: OBESE, SOFT, NT, NO HSM, BS+  EXT: NO EDEMA, NT, PULSES +. MARI'S -VE  NEURO: ALERT AND ORIENTED X 3, NO MENINGEAL SIGNS, NO TREMORS, NL GAIT, NO FOCAL DEFICITS  PSYCH: FAIRLY GOOD AFFECT  BACK: NT, NO ROM, NO CVA TENDERNESS  FOOT: DEFERRED      PREVIOUS LABS REVIEWED AND D/W PT    ACCUCHECK: 126    POC HBA1C: 6.8      ASSESSMENT / PLAN:     Diagnosis Orders   1. Type 2 diabetes mellitus with other specified complication, with long-term current use of insulin ([facility]  COUNSELLED. STABLE.  CONTINUE MGT  DIET/ EXERCISE. MONITOR. GOAL FBS 80- 120, HBA1C < 7  ADVISED ON HOME BLOOD SUGAR MONITORING  F/U DM EYE EXAM.  MAKE CHANGES AS NEEDED. 2. Mixed hyperlipidemia  COUNSELLED. ADVISED ZOCOR 10 MG  ADVISED LOW FAT / CHOL DIET/ EXERCISE.  MONITOR. F/U LABS  GOALS D/W PT.  MAKE CHANGES AS NEEDED. 3. Moderate persistent asthma without complication  COUNSELLED. ADVISED CONTINUE ADVAIR. ALBUTEROL PRN. MONITOR. MONITOR FOR TRIGGERS- ENVIRONMENTAL MODIFICATION. MAKE CHANGES AS NEEDED. 4. Allergic sinusitis  COUNSELLED. MED PRN. MONITOR  MAKE CHANGES AS NEEDED. 5. Vitamin D deficiency  COUNSELLED. MONITOR ON VIT D SUPPLEMENT. MAKE CHANGES AS NEEDED. 6. Smokes cigarettes  Smoking cessation was encouraged. Cessation techniques reviewed today. COUNSELLED. CESSATION ADVISED   WA TOBACCO USE CESSATION INTERMEDIATE 4-72 MINUTES  COMPLICATIONS OF TOBACCO USE INCLUDING COPD, CANCER, CAD D/W PT  PT VERBALIZED UNDERSTANDING  CONTINUE WELLBUTRIN  MG extended ADVISED SET QUIT DATE  MAKE CHANGES AS NEEDED. 7. Morbid obesity with BMI of 40.0-44.9, adult ([facility]  COUNSELLED. DIET/ EXERCISE ADVISED. LIFESTYLE MODIFICATION. WT LOSS ADVISED. MONITOR AND MAKE CHANGES AS NEEDED. 6. Female fertility problem  COUNSELLED. REFER GYNE FOR FURTHER EVAL. MONITOR  MAKE CHANGES AS NEEDED. PT DEFERRED T NAILA - KEKE      Atul received counseling on the following healthy behaviors: nutrition, exercise, medication adherence and tobacco cessation    Patient given educational materials on Diabetes, Hyperlipidemia, Smoking Cessation, Nutrition and Exercise    I have instructed Atul to complete a self tracking handout on Blood Sugars , Weights and Smoking and instructed them to bring it with them to her next appointment. Discussed use, benefit, and side effects of prescribed medications. Barriers to medication compliance addressed. All patient questions answered.   Pt voiced

## 2019-08-15 ENCOUNTER — OFFICE VISIT (OUTPATIENT)
Dept: GYNECOLOGY | Age: 29
End: 2019-08-15
Payer: COMMERCIAL

## 2019-08-15 VITALS
WEIGHT: 266 LBS | BODY MASS INDEX: 42.75 KG/M2 | RESPIRATION RATE: 17 BRPM | DIASTOLIC BLOOD PRESSURE: 75 MMHG | SYSTOLIC BLOOD PRESSURE: 152 MMHG | HEART RATE: 69 BPM | HEIGHT: 66 IN

## 2019-08-15 DIAGNOSIS — R10.2 PELVIC PAIN IN FEMALE: ICD-10-CM

## 2019-08-15 DIAGNOSIS — N92.6 IRREGULAR MENSTRUAL BLEEDING: ICD-10-CM

## 2019-08-15 DIAGNOSIS — Z01.419 WELL WOMAN EXAM WITH ROUTINE GYNECOLOGICAL EXAM: Primary | ICD-10-CM

## 2019-08-15 PROCEDURE — 99385 PREV VISIT NEW AGE 18-39: CPT | Performed by: OBSTETRICS & GYNECOLOGY

## 2019-08-17 ASSESSMENT — ENCOUNTER SYMPTOMS
EYES NEGATIVE: 1
GASTROINTESTINAL NEGATIVE: 1
RESPIRATORY NEGATIVE: 1

## 2019-08-23 DIAGNOSIS — N92.6 IRREGULAR MENSTRUAL BLEEDING: Primary | ICD-10-CM

## 2019-10-09 ENCOUNTER — HOSPITAL ENCOUNTER (EMERGENCY)
Age: 29
Discharge: HOME | End: 2019-10-09
Attending: EMERGENCY MEDICINE

## 2019-10-09 VITALS
SYSTOLIC BLOOD PRESSURE: 120 MMHG | DIASTOLIC BLOOD PRESSURE: 74 MMHG | HEIGHT: 65 IN | RESPIRATION RATE: 17 BRPM | OXYGEN SATURATION: 100 % | WEIGHT: 260.7 LBS | TEMPERATURE: 98.9 F | HEART RATE: 82 BPM | BODY MASS INDEX: 43.43 KG/M2

## 2019-10-09 DIAGNOSIS — R51.9 ACUTE NONINTRACTABLE HEADACHE, UNSPECIFIED HEADACHE TYPE: Primary | ICD-10-CM

## 2019-10-09 DIAGNOSIS — N12 PYELONEPHRITIS: ICD-10-CM

## 2019-10-09 DIAGNOSIS — R42 DIZZINESS: ICD-10-CM

## 2019-10-09 LAB
RAPID INFLUENZA  B AGN: NEGATIVE
RAPID INFLUENZA A AGN: NEGATIVE

## 2019-10-09 PROCEDURE — 99283 EMERGENCY DEPT VISIT LOW MDM: CPT

## 2019-10-09 PROCEDURE — 6370000000 HC RX 637 (ALT 250 FOR IP): Performed by: EMERGENCY MEDICINE

## 2019-10-09 PROCEDURE — 96361 HYDRATE IV INFUSION ADD-ON: CPT

## 2019-10-09 PROCEDURE — 6360000002 HC RX W HCPCS: Performed by: EMERGENCY MEDICINE

## 2019-10-09 PROCEDURE — 87804 INFLUENZA ASSAY W/OPTIC: CPT

## 2019-10-09 PROCEDURE — 2580000003 HC RX 258: Performed by: EMERGENCY MEDICINE

## 2019-10-09 PROCEDURE — 96375 TX/PRO/DX INJ NEW DRUG ADDON: CPT

## 2019-10-09 PROCEDURE — 96374 THER/PROPH/DIAG INJ IV PUSH: CPT

## 2019-10-09 RX ORDER — IBUPROFEN 600 MG/1
600 TABLET ORAL EVERY 8 HOURS PRN
Qty: 20 TABLET | Refills: 1 | Status: SHIPPED | OUTPATIENT
Start: 2019-10-09 | End: 2020-11-12 | Stop reason: ALTCHOICE

## 2019-10-09 RX ORDER — ACETAMINOPHEN 500 MG
1000 TABLET ORAL ONCE
Status: COMPLETED | OUTPATIENT
Start: 2019-10-09 | End: 2019-10-09

## 2019-10-09 RX ORDER — BUTALBITAL, ACETAMINOPHEN AND CAFFEINE 50; 325; 40 MG/1; MG/1; MG/1
1 TABLET ORAL EVERY 4 HOURS PRN
Qty: 20 TABLET | Refills: 1 | Status: SHIPPED | OUTPATIENT
Start: 2019-10-09 | End: 2020-08-25

## 2019-10-09 RX ORDER — DIPHENHYDRAMINE HYDROCHLORIDE 50 MG/ML
12.5 INJECTION INTRAMUSCULAR; INTRAVENOUS ONCE
Status: COMPLETED | OUTPATIENT
Start: 2019-10-09 | End: 2019-10-09

## 2019-10-09 RX ORDER — SODIUM CHLORIDE 9 MG/ML
1000 INJECTION, SOLUTION INTRAVENOUS ONCE
Status: COMPLETED | OUTPATIENT
Start: 2019-10-09 | End: 2019-10-09

## 2019-10-09 RX ORDER — KETOROLAC TROMETHAMINE 30 MG/ML
30 INJECTION, SOLUTION INTRAMUSCULAR; INTRAVENOUS ONCE
Status: COMPLETED | OUTPATIENT
Start: 2019-10-09 | End: 2019-10-09

## 2019-10-09 RX ORDER — KETOROLAC TROMETHAMINE 15 MG/ML
15 INJECTION, SOLUTION INTRAMUSCULAR; INTRAVENOUS EVERY 6 HOURS PRN
Status: DISCONTINUED | OUTPATIENT
Start: 2019-10-09 | End: 2019-10-09

## 2019-10-09 RX ORDER — METOCLOPRAMIDE HYDROCHLORIDE 5 MG/ML
5 INJECTION INTRAMUSCULAR; INTRAVENOUS ONCE
Status: DISCONTINUED | OUTPATIENT
Start: 2019-10-09 | End: 2019-10-09

## 2019-10-09 RX ORDER — MECLIZINE HYDROCHLORIDE 25 MG/1
25 TABLET ORAL 3 TIMES DAILY PRN
Qty: 21 TABLET | Refills: 1 | Status: SHIPPED | OUTPATIENT
Start: 2019-10-09 | End: 2019-10-16

## 2019-10-09 RX ORDER — METOCLOPRAMIDE HYDROCHLORIDE 5 MG/ML
10 INJECTION INTRAMUSCULAR; INTRAVENOUS ONCE
Status: COMPLETED | OUTPATIENT
Start: 2019-10-09 | End: 2019-10-09

## 2019-10-09 RX ADMIN — SODIUM CHLORIDE 1000 ML: 9 INJECTION, SOLUTION INTRAVENOUS at 14:22

## 2019-10-09 RX ADMIN — ACETAMINOPHEN 1000 MG: 500 TABLET ORAL at 14:27

## 2019-10-09 RX ADMIN — KETOROLAC TROMETHAMINE 30 MG: 30 INJECTION, SOLUTION INTRAMUSCULAR at 14:22

## 2019-10-09 RX ADMIN — DIPHENHYDRAMINE HYDROCHLORIDE 12.5 MG: 50 INJECTION, SOLUTION INTRAMUSCULAR; INTRAVENOUS at 14:23

## 2019-10-09 RX ADMIN — METOCLOPRAMIDE 10 MG: 5 INJECTION, SOLUTION INTRAMUSCULAR; INTRAVENOUS at 14:25

## 2019-10-09 ASSESSMENT — PAIN DESCRIPTION - PROGRESSION: CLINICAL_PROGRESSION: GRADUALLY IMPROVING

## 2019-10-09 ASSESSMENT — PAIN SCALES - GENERAL
PAINLEVEL_OUTOF10: 10
PAINLEVEL_OUTOF10: 5
PAINLEVEL_OUTOF10: 10
PAINLEVEL_OUTOF10: 10

## 2019-10-09 ASSESSMENT — PAIN DESCRIPTION - FREQUENCY: FREQUENCY: CONTINUOUS

## 2019-10-09 ASSESSMENT — PAIN DESCRIPTION - PAIN TYPE: TYPE: ACUTE PAIN

## 2019-10-09 ASSESSMENT — PAIN DESCRIPTION - DESCRIPTORS: DESCRIPTORS: POUNDING

## 2019-10-09 ASSESSMENT — PAIN DESCRIPTION - LOCATION: LOCATION: HEAD

## 2020-02-05 ENCOUNTER — HOSPITAL ENCOUNTER (EMERGENCY)
Age: 30
Discharge: HOME | End: 2020-02-05
Attending: EMERGENCY MEDICINE
Payer: COMMERCIAL

## 2020-02-05 VITALS
BODY MASS INDEX: 45.07 KG/M2 | HEART RATE: 89 BPM | SYSTOLIC BLOOD PRESSURE: 164 MMHG | WEIGHT: 270.5 LBS | RESPIRATION RATE: 14 BRPM | TEMPERATURE: 98 F | DIASTOLIC BLOOD PRESSURE: 97 MMHG | OXYGEN SATURATION: 100 % | HEIGHT: 65 IN

## 2020-02-05 LAB
BACTERIA: ABNORMAL /HPF
BILIRUBIN URINE: NEGATIVE
BLOOD, URINE: ABNORMAL
CLARITY: CLEAR
COLOR: YELLOW
EPITHELIAL CELLS, UA: ABNORMAL /HPF
GLUCOSE URINE: NEGATIVE MG/DL
HCG(URINE) PREGNANCY TEST: NEGATIVE
KETONES, URINE: NEGATIVE MG/DL
LEUKOCYTE ESTERASE, URINE: ABNORMAL
MICROSCOPIC EXAMINATION: YES
NITRITE, URINE: NEGATIVE
PH UA: 6 (ref 5–8)
PROTEIN UA: NEGATIVE MG/DL
RBC UA: ABNORMAL /HPF (ref 0–2)
SPECIFIC GRAVITY UA: 1.01 (ref 1–1.03)
URINE TYPE: ABNORMAL
UROBILINOGEN, URINE: 0.2 E.U./DL
WBC UA: ABNORMAL /HPF (ref 0–5)

## 2020-02-05 PROCEDURE — 99283 EMERGENCY DEPT VISIT LOW MDM: CPT

## 2020-02-05 PROCEDURE — 81001 URINALYSIS AUTO W/SCOPE: CPT

## 2020-02-05 PROCEDURE — 84703 CHORIONIC GONADOTROPIN ASSAY: CPT

## 2020-02-05 RX ORDER — NAPROXEN 500 MG/1
500 TABLET ORAL 2 TIMES DAILY
Qty: 20 TABLET | Refills: 0 | Status: SHIPPED | OUTPATIENT
Start: 2020-02-05 | End: 2020-08-25

## 2020-02-05 RX ORDER — NITROFURANTOIN 25; 75 MG/1; MG/1
100 CAPSULE ORAL 2 TIMES DAILY
Qty: 14 CAPSULE | Refills: 0 | Status: SHIPPED | OUTPATIENT
Start: 2020-02-05 | End: 2020-02-09

## 2020-02-05 ASSESSMENT — PAIN DESCRIPTION - ORIENTATION: ORIENTATION: LOWER

## 2020-02-05 ASSESSMENT — PAIN DESCRIPTION - PAIN TYPE: TYPE: ACUTE PAIN

## 2020-02-05 ASSESSMENT — PAIN SCALES - GENERAL: PAINLEVEL_OUTOF10: 5

## 2020-02-05 ASSESSMENT — PAIN DESCRIPTION - DESCRIPTORS: DESCRIPTORS: ACHING

## 2020-02-05 ASSESSMENT — PAIN DESCRIPTION - LOCATION: LOCATION: BACK

## 2020-02-05 NOTE — ED NOTES
Patient to ed with complaints of lower back pain which started yesterday patient denies injury and has had back pain in the past, patient has concerns of a possible uti and has had increased urination.      Bernie Jenkins RN  02/05/20 [id number]

## 2020-02-06 NOTE — ED PROVIDER NOTES
DISKUS INHALER    Inhale 1 puff into the lungs 2 times daily    GLUCOSE BLOOD VI TEST STRIPS (ONE TOUCH TEST STRIPS) STRIP    1 each by In Vitro route 2 times daily As needed. IBUPROFEN (IBU) 600 MG TABLET    Take 1 tablet by mouth every 8 hours as needed for Pain or Fever (with food)    INSULIN GLARGINE (LANTUS SOLOSTAR) 100 UNIT/ML INJECTION PEN    Inject 34 Units into the skin nightly    INSULIN PEN NEEDLE 31G X 6 MM MISC    1 each by Does not apply route nightly    NONFORMULARY    hydroxycut       ALLERGIES     Codeine and Lactose    FAMILY HISTORY       Family History   Problem Relation Age of Onset    Cancer Father     Hypertension Father     Hypertension Maternal Aunt           SOCIAL HISTORY       Social History     Socioeconomic History    Marital status: Single     Spouse name: None    Number of children: None    Years of education: None    Highest education level: None   Occupational History    None   Social Needs    Financial resource strain: None    Food insecurity:     Worry: None     Inability: None    Transportation needs:     Medical: None     Non-medical: None   Tobacco Use    Smoking status: Former Smoker     Packs/day: 0.10     Types: Cigarettes     Last attempt to quit: 3/26/2018     Years since quittin.8    Smokeless tobacco: Former User     Quit date: 2017    Tobacco comment: pt would like to stop smoking today she stated she needs help to stop   Substance and Sexual Activity    Alcohol use:  Yes     Alcohol/week: 0.8 standard drinks     Types: 1 Standard drinks or equivalent per week     Comment: once a week    Drug use: No    Sexual activity: Yes     Partners: Male   Lifestyle    Physical activity:     Days per week: None     Minutes per session: None    Stress: None   Relationships    Social connections:     Talks on phone: None     Gets together: None     Attends Congregational service: None     Active member of club or organization: None     Attends meetings of following components:    Bacteria, UA 1+ (*)     All other components within normal limits    Narrative:     Performed at:  [facility]) - [facility]  [address],  [place]   Phone [phone]   PREGNANCY, URINE    Narrative:     Performed at:  [facility]) - [facility]  [address],  [place]   Phone [phone]       All other labs were within normal range or not returned as of this dictation. EKG: All EKG's are interpreted by the Emergency Department Physician who eithersigns or Co-signs this chart in the absence of a cardiologist.        RADIOLOGY:   Non-plain film images such as CT, Ultrasound and MRI are read by the radiologist. Plain radiographic images are visualized by myself. *    Interpretation per the Radiologist below, if available at the time of this note:    No orders to display         PROCEDURES   Unless otherwise noted below, none     Procedures    *    CRITICAL CARE TIME   N/A      EMERGENCY DEPARTMENT COURSE and DIFFERENTIALDIAGNOSIS/MDM:   Vitals:    Vitals:    02/05/20 1749   BP: (!) 164/97   Pulse: 89   Resp: 14   Temp: 98 °F (36.7 °C)   SpO2: 100%   Weight: 122.7 kg (270 lb 8 oz)   Height: 5' 5\" (1.651 m)       Patient was given thefollowing medications:  Medications - No data to display      The patient tolerated their visit well. The patient and / or the familywere informed of the results of any tests, a time was given to answer questions. FINAL IMPRESSION      1.  Urinary tract infection without hematuria, site unspecified          DISPOSITION/PLAN   DISPOSITION Decision To Discharge 02/05/2020 06:59:24 PM      PATIENT REFERRED TO:  Duane Charles, MD  [address] [id number]    In 2 days        DISCHARGE MEDICATIONS:  New Prescriptions    NAPROXEN (NAPROSYN) 500 MG TABLET    Take 1 tablet by mouth 2 times daily for 20 doses    NITROFURANTOIN, MACROCRYSTAL-MONOHYDRATE, (MACROBID) 100 MG CAPSULE    Take 1 capsule by mouth 2 times daily for 7 doses       DISCONTINUED MEDICATIONS:  Discontinued Medications    ASPIRIN EC 81 MG EC TABLET    Take 1 tablet by mouth daily    BUPROPION (WELLBUTRIN XL) 150 MG EXTENDED RELEASE TABLET    Take 1 tablet by mouth every morning    SIMVASTATIN (ZOCOR) 10 MG TABLET    Take 1 tablet by mouth nightly              (Please note that portions of this note were completed with a voice recognition program.  Efforts were made to edit the dictations but occasionally words are mis-transcribed.)    Kate De Guzman MD (electronically signed)      Kate De Guzman MD  02/05/20 5854

## 2020-05-18 ENCOUNTER — HOSPITAL ENCOUNTER (EMERGENCY)
Age: 30
Discharge: HOME | End: 2020-05-18
Attending: EMERGENCY MEDICINE
Payer: MEDICAID

## 2020-05-18 VITALS
OXYGEN SATURATION: 99 % | HEART RATE: 77 BPM | DIASTOLIC BLOOD PRESSURE: 88 MMHG | WEIGHT: 266.56 LBS | SYSTOLIC BLOOD PRESSURE: 145 MMHG | TEMPERATURE: 98.5 F | BODY MASS INDEX: 44.41 KG/M2 | RESPIRATION RATE: 14 BRPM | HEIGHT: 65 IN

## 2020-05-18 LAB
AMORPHOUS: ABNORMAL /HPF
BACTERIA: ABNORMAL /HPF
BILIRUBIN URINE: NEGATIVE
BLOOD, URINE: ABNORMAL
CLARITY: CLEAR
COLOR: YELLOW
EPITHELIAL CELLS, UA: ABNORMAL /HPF (ref 0–5)
GLUCOSE URINE: NEGATIVE MG/DL
HCG(URINE) PREGNANCY TEST: NEGATIVE
KETONES, URINE: NEGATIVE MG/DL
LEUKOCYTE ESTERASE, URINE: ABNORMAL
MICROSCOPIC EXAMINATION: YES
NITRITE, URINE: NEGATIVE
PH UA: 6 (ref 5–8)
PROTEIN UA: NEGATIVE MG/DL
RBC UA: ABNORMAL /HPF (ref 0–4)
S PYO AG THROAT QL: POSITIVE
SPECIFIC GRAVITY UA: 1.02 (ref 1–1.03)
URINE TYPE: ABNORMAL
UROBILINOGEN, URINE: 0.2 E.U./DL
WBC UA: ABNORMAL /HPF (ref 0–5)

## 2020-05-18 PROCEDURE — 99283 EMERGENCY DEPT VISIT LOW MDM: CPT

## 2020-05-18 PROCEDURE — 81001 URINALYSIS AUTO W/SCOPE: CPT

## 2020-05-18 PROCEDURE — 6370000000 HC RX 637 (ALT 250 FOR IP): Performed by: EMERGENCY MEDICINE

## 2020-05-18 PROCEDURE — 84703 CHORIONIC GONADOTROPIN ASSAY: CPT

## 2020-05-18 PROCEDURE — 87880 STREP A ASSAY W/OPTIC: CPT

## 2020-05-18 RX ORDER — AMOXICILLIN 250 MG/1
500 CAPSULE ORAL ONCE
Status: COMPLETED | OUTPATIENT
Start: 2020-05-18 | End: 2020-05-18

## 2020-05-18 RX ORDER — AMOXICILLIN 875 MG/1
875 TABLET, COATED ORAL 2 TIMES DAILY
Qty: 20 TABLET | Refills: 0 | Status: SHIPPED | OUTPATIENT
Start: 2020-05-18 | End: 2020-05-28

## 2020-05-18 RX ADMIN — AMOXICILLIN 500 MG: 250 CAPSULE ORAL at 02:28

## 2020-05-18 ASSESSMENT — PAIN SCALES - GENERAL
PAINLEVEL_OUTOF10: 8
PAINLEVEL_OUTOF10: 6

## 2020-05-18 ASSESSMENT — PAIN DESCRIPTION - PAIN TYPE: TYPE: ACUTE PAIN

## 2020-05-18 ASSESSMENT — PAIN DESCRIPTION - DESCRIPTORS: DESCRIPTORS: SORE

## 2020-05-18 ASSESSMENT — PAIN DESCRIPTION - LOCATION: LOCATION: THROAT

## 2020-05-18 NOTE — ED PROVIDER NOTES
[facility] EMERGENCY DEPT VISIT      Patient Identification  Christiano Hameed is a 34 y.o. female. Chief Complaint   Pharyngitis and Other (Late menstrual cycle.)      History of Present Illness: This is a  34 y.o. female who presents ambulatory  to the ED with complaints of 5 day h/o sore throat. She has had no fever. Reports some sinus drainage. No significant cough. Hurts to swallow. No trouble breathing. No chest pain. No known contact with strep or mono or covid. Also reports last period was 3/26/20 and started with light vaginal spotting 5 days ago. Only pink to wipe. Not wearing pads or tampons. No abdominal or pelvic pain. No discharge. No trouble urinating. Nausea with no vomiting. No diarrhea. Past Medical History:   Diagnosis Date    Asthma     Diabetes mellitus ([address]     Dysmenorrhea     Hyperlipidemia     Lactose intolerance     Menstrual irregularity     Obesity     Tobacco dependence        Past Surgical History:   Procedure Laterality Date    HIP SURGERY Bilateral     for dislocation - ? 2006/2007       No current facility-administered medications for this encounter.      Current Outpatient Medications:     amoxicillin (AMOXIL) 875 MG tablet, Take 1 tablet by mouth 2 times daily for 10 days, Disp: 20 tablet, Rfl: 0    naproxen (NAPROSYN) 500 MG tablet, Take 1 tablet by mouth 2 times daily for 20 doses, Disp: 20 tablet, Rfl: 0    butalbital-acetaminophen-caffeine (FIORICET, ESGIC) -40 MG per tablet, Take 1 tablet by mouth every 4 hours as needed for Headaches, Disp: 20 tablet, Rfl: 1    ibuprofen (IBU) 600 MG tablet, Take 1 tablet by mouth every 8 hours as needed for Pain or Fever (with food), Disp: 20 tablet, Rfl: 1    insulin glargine (LANTUS SOLOSTAR) 100 UNIT/ML injection pen, Inject 34 Units into the skin nightly, Disp: 5 pen, Rfl: 3    fluticasone-salmeterol (ADVAIR DISKUS) 100-50 MCG/DOSE diskus inhaler, Inhale 1 puff into the lungs 2 times daily, Disp: 1 Inhaler, Rfl: 3    Cholecalciferol (VITAMIN D) 2000 units CAPS capsule, Take 1 capsule by mouth daily, Disp: 30 capsule, Rfl: 3    albuterol sulfate HFA (VENTOLIN HFA) 108 (90 Base) MCG/ACT inhaler, Inhale 2 puffs into the lungs 4 times daily as needed for Wheezing, Disp: 1 Inhaler, Rfl: 1    NONFORMULARY, hydroxycut, Disp: , Rfl:     Insulin Pen Needle 31G X 6 MM MISC, 1 each by Does not apply route nightly, Disp: 50 each, Rfl: 3    Blood Glucose Monitoring Suppl (FREESTYLE LITE) SHANE, FOLLOW PACKAGE DIRECTIONS, Disp: 1 Device, Rfl: 0    glucose blood VI test strips (ONE TOUCH TEST STRIPS) strip, 1 each by In Vitro route 2 times daily As needed. , Disp: 100 each, Rfl: 3    Allergies   Allergen Reactions    Codeine Anaphylaxis, Swelling and Other (See Comments)     Throat swelling.  Lactose Nausea And Vomiting       Social History     Socioeconomic History    Marital status: Single     Spouse name: Not on file    Number of children: Not on file    Years of education: Not on file    Highest education level: Not on file   Occupational History    Not on file   Social Needs    Financial resource strain: Not on file    Food insecurity     Worry: Not on file     Inability: Not on file    Transportation needs     Medical: Not on file     Non-medical: Not on file   Tobacco Use    Smoking status: Former Smoker     Packs/day: 0.10     Types: Cigarettes     Last attempt to quit: 3/26/2018     Years since quittin.1    Smokeless tobacco: Former User     Quit date: 2017    Tobacco comment: pt would like to stop smoking today she stated she needs help to stop   Substance and Sexual Activity    Alcohol use:  Yes     Alcohol/week: 0.8 standard drinks     Types: 1 Standard drinks or equivalent per week     Comment: once a week    Drug use: No    Sexual activity: Yes     Partners: Male   Lifestyle    Physical activity     Days per week: Not on file     Minutes per session: Not on file    Stress: Not on file

## 2020-05-19 ENCOUNTER — CARE COORDINATION (OUTPATIENT)
Dept: CARE COORDINATION | Age: 30
End: 2020-05-19

## 2020-05-19 NOTE — CARE COORDINATION
Provider Whitney Andre   8/25/2020  3:20 PM Romelia Contreras MD [facility]       Cindi Dawkins MSN, RN  Ambulatory Care Manager  [phone]  [email or web address]

## 2020-05-24 ENCOUNTER — CARE COORDINATION (OUTPATIENT)
Dept: CARE COORDINATION | Age: 30
End: 2020-05-24

## 2020-05-24 NOTE — CARE COORDINATION
Attempted outreach call; left a VM with ACM call-back information. Future Appointments   Date Time Provider Whitney Andre   8/25/2020  3:20 PM Charmayne Search, MD [facility]       John Marcos MSN, RN  Ambulatory Care Manager  [phone]  [email or web address]

## 2020-06-01 ENCOUNTER — CARE COORDINATION (OUTPATIENT)
Dept: CARE COORDINATION | Age: 30
End: 2020-06-01

## 2020-06-01 NOTE — CARE COORDINATION
You Patient resolved from the Care Transitions episode on 6/1/20. Discussed COVID-19 related testing which was not done at this time. Test results were not done. Patient informed of results, if available? N/A    Patient/family has been provided the following resources and education related to COVID-19:                         Signs, symptoms and red flags related to COVID-19            CDC exposure and quarantine guidelines            [facility] exposure contact - [phone]            Contact for their local Department of Health               Patient currently reports that the following symptoms have improved:  no new/worsening symptoms; she states that she is doing well and that her strep throat resolved. No further outreach scheduled with this CTN/ACM. Episode of Care resolved. Patient has this CTN/ACM contact information if future needs arise. Future Appointments   Date Time Provider Whitney Andre   8/25/2020  3:20 PM Donnie Parra MD [facility] Liriano MSN, RN  Ambulatory Care Manager  [phone]  [email or web address]

## 2020-08-18 ENCOUNTER — HOSPITAL ENCOUNTER (EMERGENCY)
Age: 30
Discharge: HOME | End: 2020-08-19
Attending: EMERGENCY MEDICINE
Payer: COMMERCIAL

## 2020-08-18 VITALS
HEIGHT: 65 IN | RESPIRATION RATE: 18 BRPM | OXYGEN SATURATION: 100 % | TEMPERATURE: 98 F | DIASTOLIC BLOOD PRESSURE: 102 MMHG | WEIGHT: 271.4 LBS | BODY MASS INDEX: 45.22 KG/M2 | SYSTOLIC BLOOD PRESSURE: 149 MMHG | HEART RATE: 85 BPM

## 2020-08-18 PROCEDURE — 6370000000 HC RX 637 (ALT 250 FOR IP): Performed by: EMERGENCY MEDICINE

## 2020-08-18 PROCEDURE — 94640 AIRWAY INHALATION TREATMENT: CPT

## 2020-08-18 PROCEDURE — 99284 EMERGENCY DEPT VISIT MOD MDM: CPT

## 2020-08-18 RX ORDER — ALBUTEROL SULFATE 90 UG/1
2 AEROSOL, METERED RESPIRATORY (INHALATION) EVERY 6 HOURS PRN
Qty: 1 INHALER | Refills: 3 | Status: SHIPPED | OUTPATIENT
Start: 2020-08-18 | End: 2020-10-26 | Stop reason: SDUPTHER

## 2020-08-18 RX ORDER — PREDNISONE 10 MG/1
50 TABLET ORAL DAILY
Qty: 25 TABLET | Refills: 0 | Status: SHIPPED | OUTPATIENT
Start: 2020-08-18 | End: 2020-08-23

## 2020-08-18 RX ORDER — IPRATROPIUM BROMIDE AND ALBUTEROL SULFATE 2.5; .5 MG/3ML; MG/3ML
1 SOLUTION RESPIRATORY (INHALATION) ONCE
Status: COMPLETED | OUTPATIENT
Start: 2020-08-18 | End: 2020-08-18

## 2020-08-18 RX ORDER — GABAPENTIN 100 MG/1
300 CAPSULE ORAL 3 TIMES DAILY
COMMUNITY
End: 2020-12-12 | Stop reason: ALTCHOICE

## 2020-08-18 RX ADMIN — PREDNISONE 50 MG: 10 TABLET ORAL at 23:37

## 2020-08-18 RX ADMIN — IPRATROPIUM BROMIDE AND ALBUTEROL SULFATE 1 AMPULE: .5; 3 SOLUTION RESPIRATORY (INHALATION) at 23:45

## 2020-08-18 ASSESSMENT — PAIN SCALES - GENERAL: PAINLEVEL_OUTOF10: 7

## 2020-08-18 ASSESSMENT — PAIN DESCRIPTION - LOCATION: LOCATION: CHEST

## 2020-08-19 NOTE — ED PROVIDER NOTES
[facility] Emergency Department    CHIEF COMPLAINT  Chief Complaint   Patient presents with    Shortness of Breath      HISTORY OF PRESENT ILLNESS  Wolfgang Conde is a 34 y.o. female  who presents to the ED complaining of some mild nasal congestion and seasonal allergies since Monday which have triggered a mild asthma exacerbation. She does have a little bit of a wheeze but says that this is exactly like many previous asthma attacks that she has had in the past.  She could not find her inhaler at home prompting ED evaluation. She tells me that she had found her inhaler she would not necessarily need to be here. She does not truly feel short of breath despite the chief complaint, she is actually more is complaining of a little bit of chest tightness. This is normal for her asthma flares. She has had no fevers. She has had a minimal cough but no sputum at all, no fevers, no sore throat, no abdominal pain nausea vomiting diarrhea body aches chills sweats or other complaints. Denies chance of pregnancy. No other complaints, modifying factors or associated symptoms. I have reviewed the following from the nursing documentation.     Past Medical History:   Diagnosis Date    Asthma     Diabetes mellitus ([address]     Dysmenorrhea     Hyperlipidemia     Lactose intolerance     Menstrual irregularity     Obesity     Tobacco dependence      Past Surgical History:   Procedure Laterality Date    HIP SURGERY Bilateral     for dislocation - ? 2006/2007     Family History   Problem Relation Age of Onset    Cancer Father     Hypertension Father     Hypertension Maternal Aunt      Social History     Socioeconomic History    Marital status: Single     Spouse name: Not on file    Number of children: Not on file    Years of education: Not on file    Highest education level: Not on file   Occupational History    Not on file   Social Needs    Financial resource strain: Not on file   [place] insecurity     Worry: Not on file     Inability: Not on file    Transportation needs     Medical: Not on file     Non-medical: Not on file   Tobacco Use    Smoking status: Former Smoker     Packs/day: 0.10     Types: Cigarettes     Last attempt to quit: 3/26/2018     Years since quittin.4    Smokeless tobacco: Former User     Quit date: 2017    Tobacco comment: pt would like to stop smoking today she stated she needs help to stop   Substance and Sexual Activity    Alcohol use: Yes     Alcohol/week: 0.8 standard drinks     Types: 1 Standard drinks or equivalent per week     Comment: once a week    Drug use: No    Sexual activity: Yes     Partners: Male   Lifestyle    Physical activity     Days per week: Not on file     Minutes per session: Not on file    Stress: Not on file   Relationships    Social connections     Talks on phone: Not on file     Gets together: Not on file     Attends Tenriism service: Not on file     Active member of club or organization: Not on file     Attends meetings of clubs or organizations: Not on file     Relationship status: Not on file    Intimate partner violence     Fear of current or ex partner: Not on file     Emotionally abused: Not on file     Physically abused: Not on file     Forced sexual activity: Not on file   Other Topics Concern    Not on file   Social History Narrative    Not on file     Current Facility-Administered Medications   Medication Dose Route Frequency Provider Last Rate Last Dose    ipratropium-albuterol (DUONEB) nebulizer solution 1 ampule  1 ampule Inhalation Once Rakan Lan MD        predniSONE (DELTASONE) tablet 50 mg  50 mg Oral Once Rakan Lan MD         Current Outpatient Medications   Medication Sig Dispense Refill    gabapentin (NEURONTIN) 100 MG capsule Take 300 mg by mouth 3 times daily.  QD      predniSONE (DELTASONE) 10 MG tablet Take 5 tablets by mouth daily for 5 days 25 tablet 0    albuterol sulfate HFA (PROAIR HFA) 108 (90 Base) MCG/ACT inhaler Inhale 2 puffs into the lungs every 6 hours as needed for Wheezing 1 Inhaler 3    naproxen (NAPROSYN) 500 MG tablet Take 1 tablet by mouth 2 times daily for 20 doses 20 tablet 0    butalbital-acetaminophen-caffeine (FIORICET, ESGIC) -40 MG per tablet Take 1 tablet by mouth every 4 hours as needed for Headaches 20 tablet 1    ibuprofen (IBU) 600 MG tablet Take 1 tablet by mouth every 8 hours as needed for Pain or Fever (with food) 20 tablet 1    insulin glargine (LANTUS SOLOSTAR) 100 UNIT/ML injection pen Inject 34 Units into the skin nightly 5 pen 3    fluticasone-salmeterol (ADVAIR DISKUS) 100-50 MCG/DOSE diskus inhaler Inhale 1 puff into the lungs 2 times daily 1 Inhaler 3    Cholecalciferol (VITAMIN D) 2000 units CAPS capsule Take 1 capsule by mouth daily 30 capsule 3    albuterol sulfate HFA (VENTOLIN HFA) 108 (90 Base) MCG/ACT inhaler Inhale 2 puffs into the lungs 4 times daily as needed for Wheezing 1 Inhaler 1    NONFORMULARY hydroxycut      Insulin Pen Needle 31G X 6 MM MISC 1 each by Does not apply route nightly 50 each 3    Blood Glucose Monitoring Suppl (FREESTYLE LITE) SHANE FOLLOW PACKAGE DIRECTIONS 1 Device 0    glucose blood VI test strips (ONE TOUCH TEST STRIPS) strip 1 each by In Vitro route 2 times daily As needed. 100 each 3     Allergies   Allergen Reactions    Codeine Anaphylaxis, Swelling and Other (See Comments)     Throat swelling.  Lactose Nausea And Vomiting       REVIEW OF SYSTEMS  10 systems reviewed, pertinent positives per HPI otherwise noted to be negative. PHYSICAL EXAM  BP (!) 149/102   Pulse 85   Temp 98 °F (36.7 °C) (Oral)   Resp 18   Ht 5' 5\" (1.651 m)   Wt 271 lb 6.4 oz (123.1 kg)   LMP 08/01/2020   SpO2 99%   BMI 45.16 kg/m²    GENERAL APPEARANCE: Awake and alert. Cooperative. No distress. HENT: Normocephalic. Atraumatic. Mucous membranes are moist.  Oropharynx is clear, no erythema or exudate.   NECK: treatment here. New primary care referral was also made. During the patient's ED course, the patient was given:  Medications   ipratropium-albuterol (DUONEB) nebulizer solution 1 ampule (has no administration in time range)   predniSONE (DELTASONE) tablet 50 mg (has no administration in time range)        CLINICAL IMPRESSION  1. Mild intermittent asthma with exacerbation        Blood pressure (!) 149/102, pulse 85, temperature 98 °F (36.7 °C), temperature source Oral, resp. rate 18, height 5' 5\" (1.651 m), weight 271 lb 6.4 oz (123.1 kg), last menstrual period 08/01/2020, SpO2 99 %, not currently breastfeeding. DISPOSITION  Atul Siu was discharged to home in stable condition. I have discussed the findings of today's workup with the patient and addressed the patient's questions and concerns. Important warning signs as well as new or worsening symptoms which would necessitate immediate return to the ED were discussed. The plan is to discharge from the ED at this time, and the patient is in stable condition. The patient acknowledged understanding is agreeable with this plan. Patient was given scripts for the following medications. I counseled patient how to take these medications. New Prescriptions    ALBUTEROL SULFATE HFA (PROAIR HFA) 108 (90 BASE) MCG/ACT INHALER    Inhale 2 puffs into the lungs every 6 hours as needed for Wheezing    PREDNISONE (DELTASONE) 10 MG TABLET    Take 5 tablets by mouth daily for 5 days       Follow-up with:  [place] [address] [address]    Schedule an appointment as soon as possible for a visit in 3 days  For symptom re-evaluation, To establish a new primary care physician    [facility] Emergency Department  [facility]  [phone]  Go to   If symptoms worsen      DISCLAIMER: This chart was created using Dragon dictation software.   Efforts were made by me to ensure accuracy, however some errors may be present due to limitations of this technology and occasionally words are not transcribed correctly.         Alexandro Grubbs MD  08/18/20 5578

## 2020-08-20 ENCOUNTER — CARE COORDINATION (OUTPATIENT)
Dept: FAMILY MEDICINE CLINIC | Age: 30
End: 2020-08-20

## 2020-08-20 NOTE — CARE COORDINATION
Patient contacted regarding recent discharge and COVID-19 risk. Discussed COVID-19 related testing which was not done at this time. Test results were not done. Patient informed of results, if available?      Care Transition Nurse/ Ambulatory Care Manager contacted the patient by telephone to perform post discharge assessment. Verified name and  with patient as identifiers. Patient has following risk factors of: asthma. CTN/ACM reviewed discharge instructions, medical action plan and red flags related to discharge diagnosis. Reviewed and educated them on any new and changed medications related to discharge diagnosis. Advised obtaining a 90-day supply of all daily and as-needed medications. Education provided regarding infection prevention, and signs and symptoms of COVID-19 and when to seek medical attention with patient who verbalized understanding. Discussed exposure protocols and quarantine from [address] you at higher risk for severe illness  and given an opportunity for questions and concerns. The patient agrees to contact the COVID-19 hotline [phone] or PCP office for questions related to their healthcare. CTN/ACM provided contact information for future reference. From CDC: Are you at higher risk for severe illness?  Wash your hands often.  Avoid close contact (6 feet, which is about two arm lengths) with people who are sick.  Put distance between yourself and other people if COVID-19 is spreading in your community.  Clean and disinfect frequently touched surfaces.  Avoid all cruise travel and non-essential air travel.  Call your healthcare professional if you have concerns about COVID-19 and your underlying condition or if you are sick. For more information on steps you can take to protect yourself, see CDC's How to Protect Yourself. Patient has started the Prednisone and has used the Albuterol as needed.   Has number to f/u in [address] until she is established with a new PCP  Patient provided [phone] number to call for new patient appointment    Plan for follow-up call in 7-14 days based on severity of symptoms and risk factors.     Everette Persaud RN, MSN  Ambulatory Care Manager  [phone]

## 2020-08-25 ENCOUNTER — OFFICE VISIT (OUTPATIENT)
Dept: GYNECOLOGY | Age: 30
End: 2020-08-25
Payer: COMMERCIAL

## 2020-08-25 VITALS
SYSTOLIC BLOOD PRESSURE: 139 MMHG | WEIGHT: 265.2 LBS | BODY MASS INDEX: 44.18 KG/M2 | DIASTOLIC BLOOD PRESSURE: 93 MMHG | TEMPERATURE: 97.3 F | HEART RATE: 79 BPM | HEIGHT: 65 IN | RESPIRATION RATE: 17 BRPM

## 2020-08-25 PROCEDURE — 99395 PREV VISIT EST AGE 18-39: CPT | Performed by: OBSTETRICS & GYNECOLOGY

## 2020-08-26 ASSESSMENT — ENCOUNTER SYMPTOMS
GASTROINTESTINAL NEGATIVE: 1
RESPIRATORY NEGATIVE: 1
EYES NEGATIVE: 1

## 2020-08-27 NOTE — PROGRESS NOTES
Subjective:      Patient ID: Michele Garcia is a 34 y.o. female. Patient is here for annual. Patient wants to get pregnant. States is off insulin. Gynecologic Exam         Review of Systems   Constitutional: Negative. HENT: Negative. Eyes: Negative. Respiratory: Negative. Cardiovascular: Negative. Gastrointestinal: Negative. Genitourinary: Negative. Musculoskeletal: Negative. Skin: Negative. Neurological: Negative. Psychiatric/Behavioral: Negative. Date of Birth 1990  Past Medical History:   Diagnosis Date    Asthma     Diabetes mellitus ([facility] [address]     Dysmenorrhea     Hyperlipidemia     Lactose intolerance     Menstrual irregularity     Obesity     Tobacco dependence      Past Surgical History:   Procedure Laterality Date    HIP SURGERY Bilateral     for dislocation - ?      OB History    Para Term  AB Living   0 0 0 0 0 0   SAB TAB Ectopic Molar Multiple Live Births   0 0 0 0 0 0     Social History     Socioeconomic History    Marital status: Single     Spouse name: Not on file    Number of children: Not on file    Years of education: Not on file    Highest education level: Not on file   Occupational History    Not on file   Social Needs    Financial resource strain: Not on file    Food insecurity     Worry: Not on file     Inability: Not on file    Transportation needs     Medical: Not on file     Non-medical: Not on file   Tobacco Use    Smoking status: Former Smoker     Packs/day: 0.10     Types: Cigarettes     Last attempt to quit: 3/26/2018     Years since quittin.4    Smokeless tobacco: Former User     Quit date: 2017    Tobacco comment: pt would like to stop smoking today she stated she needs help to stop   Substance and Sexual Activity    Alcohol use:  Yes     Alcohol/week: 0.8 standard drinks     Types: 1 Standard drinks or equivalent per week     Comment: once a week    Drug use: No    Sexual activity: Yes Partners: Male   Lifestyle    Physical activity     Days per week: Not on file     Minutes per session: Not on file    Stress: Not on file   Relationships    Social connections     Talks on phone: Not on file     Gets together: Not on file     Attends Gnosticism service: Not on file     Active member of club or organization: Not on file     Attends meetings of clubs or organizations: Not on file     Relationship status: Not on file    Intimate partner violence     Fear of current or ex partner: Not on file     Emotionally abused: Not on file     Physically abused: Not on file     Forced sexual activity: Not on file   Other Topics Concern    Not on file   Social History Narrative    Not on file     Allergies   Allergen Reactions    Codeine Anaphylaxis, Swelling and Other (See Comments)     Throat swelling.  Lactose Nausea And Vomiting     Outpatient Medications Marked as Taking for the 8/25/20 encounter (Office Visit) with Latasha Stokes MD   Medication Sig Dispense Refill    gabapentin (NEURONTIN) 100 MG capsule Take 300 mg by mouth 3 times daily. QD      albuterol sulfate HFA (PROAIR HFA) 108 (90 Base) MCG/ACT inhaler Inhale 2 puffs into the lungs every 6 hours as needed for Wheezing 1 Inhaler 3    ibuprofen (IBU) 600 MG tablet Take 1 tablet by mouth every 8 hours as needed for Pain or Fever (with food) 20 tablet 1    albuterol sulfate HFA (VENTOLIN HFA) 108 (90 Base) MCG/ACT inhaler Inhale 2 puffs into the lungs 4 times daily as needed for Wheezing 1 Inhaler 1     Family History   Problem Relation Age of Onset    Cancer Father     Hypertension Father     Hypertension Maternal Aunt      BP (!) 139/93 (Site: Right Lower Arm, Position: Sitting, Cuff Size: Large Adult)   Pulse 79   Temp 97.3 °F (36.3 °C)   Resp 17   Ht 5' 5\" (1.651 m)   Wt 265 lb 3.2 oz (120.3 kg)   LMP 08/01/2020   BMI 44.13 kg/m²       Objective:   Physical Exam  Constitutional:       Appearance: Normal appearance.  She is well-developed and normal weight. HENT:      Head: Normocephalic. Nose: Nose normal.      Mouth/Throat:      Mouth: Mucous membranes are moist.      Pharynx: Oropharynx is clear. Eyes:      Pupils: Pupils are equal, round, and reactive to light. Neck:      Musculoskeletal: Normal range of motion and neck supple. No neck rigidity. Thyroid: No thyromegaly. Cardiovascular:      Rate and Rhythm: Normal rate and regular rhythm. Pulses: Normal pulses. Heart sounds: Normal heart sounds. No murmur. No friction rub. No gallop. Pulmonary:      Effort: Pulmonary effort is normal. No respiratory distress. Breath sounds: Normal breath sounds. No stridor. No wheezing, rhonchi or rales. Chest:      Chest wall: No tenderness. Breasts:         Right: Normal. No swelling, bleeding, inverted nipple, mass, nipple discharge, skin change or tenderness. Left: Normal. No swelling, bleeding, inverted nipple, mass, nipple discharge, skin change or tenderness. Abdominal:      General: Bowel sounds are normal. There is no distension. Palpations: Abdomen is soft. There is no mass. Tenderness: There is no abdominal tenderness. There is no guarding or rebound. Hernia: No hernia is present. There is no hernia in the left inguinal area. Genitourinary:     General: Normal vulva. Exam position: Lithotomy position. Pubic Area: No rash. Labia:         Right: No rash, tenderness, lesion or injury. Left: No rash, tenderness, lesion or injury. Urethra: No prolapse, urethral pain, urethral swelling or urethral lesion. Vagina: No signs of injury and foreign body. No vaginal discharge, erythema, tenderness, bleeding, lesions or prolapsed vaginal walls. Cervix: No cervical motion tenderness, discharge, friability, lesion, erythema, cervical bleeding or eversion. Uterus: Not deviated, not enlarged, not fixed and not tender.        Adnexa: Right: No mass, tenderness or fullness. Left: No mass, tenderness or fullness. Rectum: No anal fissure or external hemorrhoid. Comments: Normal urethral meatus, normal urethra, nl bladder  Musculoskeletal: Normal range of motion. General: No tenderness. Lymphadenopathy:      Cervical: No cervical adenopathy. Lower Body: No right inguinal adenopathy. No left inguinal adenopathy. Skin:     General: Skin is warm and dry. Coloration: Skin is not pale. Findings: No erythema or rash. Neurological:      General: No focal deficit present. Mental Status: She is alert and oriented to person, place, and time. Mental status is at baseline. Deep Tendon Reflexes: Reflexes are normal and symmetric. Psychiatric:         Mood and Affect: Mood normal.         Behavior: Behavior normal.         Thought Content: Thought content normal.         Judgment: Judgment normal.         Assessment:      1. Annual  2. Slightly irregular menses  3. Pre-pregnancy planning      Plan:      1. Pap, calcium, exercise  2 and 3. Check labs, hb a1c, discussed Clomid. Wants to do this.  Diaz Barrera MD

## 2020-09-03 ENCOUNTER — CARE COORDINATION (OUTPATIENT)
Dept: FAMILY MEDICINE CLINIC | Age: 30
End: 2020-09-03

## 2020-09-03 DIAGNOSIS — B37.9 YEAST INFECTION: Primary | ICD-10-CM

## 2020-09-03 RX ORDER — FLUCONAZOLE 150 MG/1
150 TABLET ORAL ONCE
Qty: 1 TABLET | Refills: 0 | Status: SHIPPED | OUTPATIENT
Start: 2020-09-03 | End: 2020-09-03

## 2020-09-03 NOTE — CARE COORDINATION
You Patient resolved from the Care Transitions episode on 8/20/20  Discussed COVID-19 related testing which was not done at this time. Test results were not done. Patient informed of results, if available? NA    Patient/family has been provided the following resources and education related to COVID-19:                         Signs, symptoms and red flags related to COVID-19            CDC exposure and quarantine guidelines            [facility] exposure contact - [phone]            Contact for their local Department of Health                 Patient currently reports that the following symptoms have improved:  shortness of breath     Patient reports \"over past 2 weeks has only needed inhaler a couple of times\". Reports she feels it was seasonal allergy induced. Has GYN f/u. Established with PCP    Future Appointments   Date Time Provider Whitney Andre   10/1/2020  3:15 PM Dwain Redman MD  [facility]   8/26/2021  4:00 PM Meera Ballesteros MD  GY[facility]       No further outreach scheduled with this CTN/ACM. Episode of Care resolved. Patient has this CTN/ACM contact information if future needs arise.     Shweta Wolf RN, MSN  Ambulatory Care Manager  [phone]

## 2020-10-26 ENCOUNTER — HOSPITAL ENCOUNTER (EMERGENCY)
Age: 30
Discharge: HOME | End: 2020-10-26
Attending: EMERGENCY MEDICINE
Payer: COMMERCIAL

## 2020-10-26 VITALS
WEIGHT: 270.2 LBS | BODY MASS INDEX: 44.96 KG/M2 | DIASTOLIC BLOOD PRESSURE: 97 MMHG | TEMPERATURE: 98.9 F | RESPIRATION RATE: 17 BRPM | SYSTOLIC BLOOD PRESSURE: 147 MMHG | HEART RATE: 86 BPM | OXYGEN SATURATION: 99 %

## 2020-10-26 LAB
BACTERIA: ABNORMAL /HPF
BILIRUBIN URINE: NEGATIVE
BLOOD, URINE: ABNORMAL
CLARITY: ABNORMAL
COLOR: YELLOW
EPITHELIAL CELLS, UA: ABNORMAL /HPF (ref 0–5)
GLUCOSE URINE: NEGATIVE MG/DL
HCG(URINE) PREGNANCY TEST: NEGATIVE
KETONES, URINE: NEGATIVE MG/DL
LEUKOCYTE ESTERASE, URINE: ABNORMAL
MICROSCOPIC EXAMINATION: YES
NITRITE, URINE: NEGATIVE
PH UA: 5.5 (ref 5–8)
PROTEIN UA: NEGATIVE MG/DL
RBC UA: ABNORMAL /HPF (ref 0–4)
SPECIFIC GRAVITY UA: 1.02 (ref 1–1.03)
TRICHOMONAS: PRESENT /HPF
URINE TYPE: ABNORMAL
UROBILINOGEN, URINE: 0.2 E.U./DL
WBC UA: ABNORMAL /HPF (ref 0–5)

## 2020-10-26 PROCEDURE — 6370000000 HC RX 637 (ALT 250 FOR IP): Performed by: EMERGENCY MEDICINE

## 2020-10-26 PROCEDURE — 84703 CHORIONIC GONADOTROPIN ASSAY: CPT

## 2020-10-26 PROCEDURE — 99283 EMERGENCY DEPT VISIT LOW MDM: CPT

## 2020-10-26 PROCEDURE — 81001 URINALYSIS AUTO W/SCOPE: CPT

## 2020-10-26 PROCEDURE — 6360000002 HC RX W HCPCS: Performed by: EMERGENCY MEDICINE

## 2020-10-26 PROCEDURE — 96372 THER/PROPH/DIAG INJ SC/IM: CPT

## 2020-10-26 PROCEDURE — 87591 N.GONORRHOEAE DNA AMP PROB: CPT

## 2020-10-26 PROCEDURE — 87086 URINE CULTURE/COLONY COUNT: CPT

## 2020-10-26 PROCEDURE — 87491 CHLMYD TRACH DNA AMP PROBE: CPT

## 2020-10-26 RX ORDER — CEFTRIAXONE 1 G/1
1 INJECTION, POWDER, FOR SOLUTION INTRAMUSCULAR; INTRAVENOUS ONCE
Status: COMPLETED | OUTPATIENT
Start: 2020-10-26 | End: 2020-10-26

## 2020-10-26 RX ORDER — CEPHALEXIN 500 MG/1
500 CAPSULE ORAL 2 TIMES DAILY
Qty: 14 CAPSULE | Refills: 0 | Status: SHIPPED | OUTPATIENT
Start: 2020-10-26 | End: 2020-11-02

## 2020-10-26 RX ORDER — ALBUTEROL SULFATE 90 UG/1
2 AEROSOL, METERED RESPIRATORY (INHALATION) EVERY 6 HOURS PRN
Qty: 1 INHALER | Refills: 3 | Status: SHIPPED | OUTPATIENT
Start: 2020-10-26 | End: 2020-12-21 | Stop reason: SDUPTHER

## 2020-10-26 RX ORDER — AZITHROMYCIN 250 MG/1
1000 TABLET, FILM COATED ORAL ONCE
Status: COMPLETED | OUTPATIENT
Start: 2020-10-26 | End: 2020-10-26

## 2020-10-26 RX ORDER — METRONIDAZOLE 500 MG/1
2000 TABLET ORAL ONCE
Status: COMPLETED | OUTPATIENT
Start: 2020-10-26 | End: 2020-10-26

## 2020-10-26 RX ADMIN — CEFTRIAXONE 1 G: 1 INJECTION, POWDER, FOR SOLUTION INTRAMUSCULAR; INTRAVENOUS at 15:55

## 2020-10-26 RX ADMIN — METRONIDAZOLE 2000 MG: 500 TABLET ORAL at 15:55

## 2020-10-26 RX ADMIN — AZITHROMYCIN MONOHYDRATE 1000 MG: 250 TABLET ORAL at 15:55

## 2020-10-26 NOTE — ED PROVIDER NOTES
Emergency Department Provider Note  Location: [address] [place] [address]  10/26/2020     Patient Identification  Esperanza Ann is a 34 y.o. female    Chief Complaint  Urinary Tract Infection          HPI  (History provided by patient)  Patient is a 31-year-old female who presents with urinary symptoms and vaginal itchiness for the past 4 to 5 days. Associated with greenish malodorous discharge. She is concerned she may have STI. She has been having unprotected sex with multiple male partners. No fevers no chills no pelvic pain no abnormal bleeding. She describes dysuria. I have reviewed the following nursing documentation:  Allergies: Allergies   Allergen Reactions    Codeine Anaphylaxis, Swelling and Other (See Comments)     Throat swelling.  Lactose Nausea And Vomiting       Past medical history:  has a past medical history of Asthma, Diabetes mellitus ([address] Dysmenorrhea, Hyperlipidemia, Lactose intolerance, Menstrual irregularity, Obesity, and Tobacco dependence. Past surgical history:  has a past surgical history that includes hip surgery (Bilateral) and fracture surgery. Home medications:   Prior to Admission medications    Medication Sig Start Date End Date Taking? Authorizing Provider   cephALEXin (KEFLEX) 500 MG capsule Take 1 capsule by mouth 2 times daily for 7 days 10/26/20 11/2/20 Yes Berry Conklin MD   albuterol sulfate HFA (PROAIR HFA) 108 (90 Base) MCG/ACT inhaler Inhale 2 puffs into the lungs every 6 hours as needed for Wheezing 10/26/20  Yes Berry Conklin MD   albuterol sulfate HFA (VENTOLIN HFA) 108 (90 Base) MCG/ACT inhaler Inhale 2 puffs into the lungs 4 times daily as needed for Wheezing 7/3/19  Yes Sloan Lovell MD   gabapentin (NEURONTIN) 100 MG capsule Take 300 mg by mouth 3 times daily.  QD    Historical Provider, MD   ibuprofen (IBU) 600 MG tablet Take 1 tablet by mouth every 8 hours as needed for Pain or Fever (with food) 10/9/19   Macarena Rivera Helen Chávez MD   Cholecalciferol (VITAMIN D) 2000 units CAPS capsule Take 1 capsule by mouth daily  Patient not taking: Reported on 8/25/2020 7/3/19   Pernell Perez MD   NONFORMULARY hydroxycut    Historical Provider, MD   Blood Glucose Monitoring Suppl (FREESTYLE LITE) [address] DIRECTIONS  Patient not taking: Reported on 8/25/2020 1/5/17   Pernell Perez MD   glucose blood VI test strips (ONE TOUCH TEST STRIPS) strip 1 each by In Vitro route 2 times daily As needed. Patient not taking: Reported on 8/25/2020 12/19/16   Pernell Perez MD       Social history:  reports that she has been smoking cigarettes. She has been smoking about 0.10 packs per day. She quit smokeless tobacco use about 3 years ago. She reports current alcohol use of about 0.8 standard drinks of alcohol per week. She reports that she does not use drugs. Family history:    Family History   Problem Relation Age of Onset    Cancer Father     Hypertension Father     Hypertension Maternal Aunt          ROS  Review of Systems   Constitutional: Negative for chills and fever. HENT: Negative for congestion and rhinorrhea. Eyes: Negative for photophobia and visual disturbance. Respiratory: Negative for cough, shortness of breath and wheezing. Cardiovascular: Negative for chest pain and palpitations. Gastrointestinal: Negative for abdominal distention, diarrhea, nausea and vomiting. Genitourinary: Positive for dysuria and vaginal discharge. Negative for difficulty urinating, flank pain, hematuria, pelvic pain, vaginal bleeding and vaginal pain. Musculoskeletal: Negative for back pain and neck pain. Skin: Negative for rash and wound. Neurological: Negative for syncope and weakness. Psychiatric/Behavioral: Negative for agitation and confusion.          Exam  ED Triage Vitals [10/26/20 1504]   BP Temp Temp Source Pulse Resp SpO2 Height Weight   (!) 147/97 98.9 °F (37.2 °C) Oral 86 17 99 % -- 270 lb 3.2 oz (122.6 kg) Physical Exam  Vitals signs and nursing note reviewed. Constitutional:       General: She is not in acute distress. Appearance: She is well-developed. HENT:      Head: Normocephalic and atraumatic. Nose: Nose normal. No congestion. Eyes:      Extraocular Movements: Extraocular movements intact. Pupils: Pupils are equal, round, and reactive to light. Neck:      Musculoskeletal: Normal range of motion and neck supple. Cardiovascular:      Rate and Rhythm: Normal rate and regular rhythm. Heart sounds: No murmur. Pulmonary:      Effort: Pulmonary effort is normal.      Breath sounds: Normal breath sounds. Abdominal:      General: There is no distension. Palpations: Abdomen is soft. Tenderness: There is no abdominal tenderness. There is no guarding or rebound. Musculoskeletal: Normal range of motion. General: No deformity. Skin:     General: Skin is warm. Findings: No rash. Neurological:      Mental Status: She is alert and oriented to person, place, and time. Motor: No abnormal muscle tone.       Coordination: Coordination normal.   Psychiatric:         Mood and Affect: Mood normal.         Behavior: Behavior normal.           ED Course    ED Medication Orders (From admission, onward)    Start Ordered     Status Ordering Provider    10/26/20 1600 10/26/20 1546  cefTRIAXone (ROCEPHIN) injection 1 g  ONCE     Question:  Antimicrobial Indications  Answer:  STD infection    Last MAR action:  Given - by Lucinda Groves on 10/26/20 at [facility], [facility] [address]    10/26/20 1600 10/26/20 1546  azithromycin (ZITHROMAX) tablet 1,000 mg  ONCE     Question:  Antimicrobial Indications  Answer:  STD infection    Last MAR action:  Given - by Lucinda Groves on 10/26/20 at [facility], [facility]    10/26/20 1600 10/26/20 1546  metroNIDAZOLE (FLAGYL) tablet 2,000 mg  ONCE     Question Answer Comment   Antimicrobial Indications STD infection    Antimicrobial Indications Urinary Tract Infection        Last MAR action:  Given - by Mee Alex on 10/26/20 at [facility], [id number] [address]              Radiology  No results found. Labs  Results for orders placed or performed during the hospital encounter of 10/26/20   Culture, Urine    Specimen: Urine, clean catch   Result Value Ref Range    Urine Culture, Routine       <50,000 CFU/ml mixed skin/urogenital flavia. No further workup   C.trachomatis N.gonorrhoeae DNA    Specimen: Cervix   Result Value Ref Range    C. trachomatis DNA Negative Negative    N. gonorrhoeae DNA Negative Negative   Urinalysis, reflex to microscopic   Result Value Ref Range    Color, UA Yellow Straw/Yellow    Clarity, UA CLOUDY (A) Clear    Glucose, Ur Negative Negative mg/dL    Bilirubin Urine Negative Negative    Ketones, Urine Negative Negative mg/dL    Specific Gravity, UA 1.025 1.005 - 1.030    Blood, Urine TRACE-INTACT (A) Negative    pH, UA 5.5 5.0 - 8.0    Protein, UA Negative Negative mg/dL    Urobilinogen, Urine 0.2 <2.0 E.U./dL    Nitrite, Urine Negative Negative    Leukocyte Esterase, Urine SMALL (A) Negative    Microscopic Examination YES     Urine Type Cleancatch    Pregnancy, Urine   Result Value Ref Range    HCG(Urine) Pregnancy Test Negative Detects HCG level >20 MIU/mL   Microscopic Urinalysis   Result Value Ref Range    WBC, UA 10-20 (A) 0 - 5 /HPF    RBC, UA 0-2 0 - 4 /HPF    Epithelial Cells, UA  (A) 0 - 5 /HPF    Bacteria, UA 4+ (A) None Seen /HPF    Trichomonas, UA Present (A) None Seen /HPF         MDM  Patient seen and evaluated. Relevant records reviewed. This a 53-year-old female who presents with dysuria and increased vaginal discharge and itchiness. On exam she is well-appearing no acute distress reassuring vital signs. She has a soft nontender abdomen. She denies any pelvic pain. Low concern for PID. She has evidence of Trichomonas on urinalysis. Will treat empirically for STDs, GC PCR is pending.   Urinalysis unclear if there is concomitant UTI so we will treat. Recommended OB/GYN follow-up return precautions. She otherwise well-appearing I believe to be safe for discharge. Patient agreeable with plan expressed understanding plan. Clinical Impression:  1. Urinary tract infection without hematuria, site unspecified    2. Trichimoniasis          Disposition:  Discharge to home in good condition. Blood pressure (!) 147/97, pulse 86, temperature 98.9 °F (37.2 °C), temperature source Oral, resp. rate 17, weight 270 lb 3.2 oz (122.6 kg), SpO2 99 %, not currently breastfeeding. Patient was given scripts for the following medications. I counseled patient how to take these medications. Discharge Medication List as of 10/26/2020  4:09 PM      START taking these medications    Details   cephALEXin (KEFLEX) 500 MG capsule Take 1 capsule by mouth 2 times daily for 7 days, Disp-14 capsule,R-0Print             Disposition referral (if applicable): Your OB/GYN doctor    Schedule an appointment as soon as possible for a visit in 1 week  For culture results. And reassessment        Total critical care time is 0 minutes, which excludes separately billable procedures and updating family. Time spent is specifically for management of the presenting complaint and symptoms initially, direct bedside care, reevaluation, review of records, and consultation. There was a high probability of clinically significant life-threatening deterioration in the patient's condition, which required my urgent intervention. This chart was generated in part by using Dragon Dictation system and may contain errors related to that system including errors in grammar, punctuation, and spelling, as well as words and phrases that may be inappropriate. If there are any questions or concerns please feel free to contact the dictating provider for clarification.      Jonathan Salinas MD  [address] MD  10/27/20 3471

## 2020-10-26 NOTE — ED NOTES
Patient given prescription, discharge instructions verbal and written, patient verbalized understanding. Alert/oriented X4, Clear speech.   Patient exhibits no distress, ambulates with steady gait per self leaving unit, no further request.     Bonny Hoskins RN  10/26/20 1726

## 2020-10-27 LAB
C TRACH DNA GENITAL QL NAA+PROBE: NEGATIVE
N. GONORRHOEAE DNA: NEGATIVE
URINE CULTURE, ROUTINE: NORMAL

## 2020-10-27 ASSESSMENT — ENCOUNTER SYMPTOMS
NAUSEA: 0
DIARRHEA: 0
WHEEZING: 0
VOMITING: 0
BACK PAIN: 0
RHINORRHEA: 0
ABDOMINAL DISTENTION: 0
SHORTNESS OF BREATH: 0
COUGH: 0
PHOTOPHOBIA: 0

## 2020-11-12 ENCOUNTER — OFFICE VISIT (OUTPATIENT)
Dept: INTERNAL MEDICINE CLINIC | Age: 30
End: 2020-11-12
Payer: COMMERCIAL

## 2020-11-12 VITALS
BODY MASS INDEX: 45.15 KG/M2 | WEIGHT: 271 LBS | RESPIRATION RATE: 14 BRPM | DIASTOLIC BLOOD PRESSURE: 90 MMHG | SYSTOLIC BLOOD PRESSURE: 142 MMHG | HEART RATE: 69 BPM | TEMPERATURE: 97.9 F | HEIGHT: 65 IN | OXYGEN SATURATION: 99 %

## 2020-11-12 LAB
CREATININE URINE: 169.8 MG/DL (ref 28–259)
HBA1C MFR BLD: 8.9 %
MICROALBUMIN UR-MCNC: <1.2 MG/DL
MICROALBUMIN/CREAT UR-RTO: NORMAL MG/G (ref 0–30)
REASON FOR REJECTION: NORMAL
REJECTED TEST: NORMAL

## 2020-11-12 PROCEDURE — 2022F DILAT RTA XM EVC RTNOPTHY: CPT | Performed by: INTERNAL MEDICINE

## 2020-11-12 PROCEDURE — G8484 FLU IMMUNIZE NO ADMIN: HCPCS | Performed by: INTERNAL MEDICINE

## 2020-11-12 PROCEDURE — 4004F PT TOBACCO SCREEN RCVD TLK: CPT | Performed by: INTERNAL MEDICINE

## 2020-11-12 PROCEDURE — G8417 CALC BMI ABV UP PARAM F/U: HCPCS | Performed by: INTERNAL MEDICINE

## 2020-11-12 PROCEDURE — 99214 OFFICE O/P EST MOD 30 MIN: CPT | Performed by: INTERNAL MEDICINE

## 2020-11-12 PROCEDURE — G8427 DOCREV CUR MEDS BY ELIG CLIN: HCPCS | Performed by: INTERNAL MEDICINE

## 2020-11-12 PROCEDURE — 83036 HEMOGLOBIN GLYCOSYLATED A1C: CPT | Performed by: INTERNAL MEDICINE

## 2020-11-12 PROCEDURE — 3052F HG A1C>EQUAL 8.0%<EQUAL 9.0%: CPT | Performed by: INTERNAL MEDICINE

## 2020-11-12 RX ORDER — ATENOLOL 25 MG/1
25 TABLET ORAL DAILY
Qty: 30 TABLET | Refills: 3 | Status: SHIPPED | OUTPATIENT
Start: 2020-11-12 | End: 2020-11-17 | Stop reason: SINTOL

## 2020-11-12 RX ORDER — NAPROXEN 500 MG/1
500 TABLET ORAL 2 TIMES DAILY WITH MEALS
COMMUNITY
End: 2021-07-22

## 2020-11-12 RX ORDER — METFORMIN HYDROCHLORIDE 500 MG/1
500 TABLET, EXTENDED RELEASE ORAL NIGHTLY
Qty: 30 TABLET | Refills: 5 | Status: SHIPPED | OUTPATIENT
Start: 2020-11-12 | End: 2021-03-15

## 2020-11-12 ASSESSMENT — PATIENT HEALTH QUESTIONNAIRE - PHQ9
SUM OF ALL RESPONSES TO PHQ QUESTIONS 1-9: 0
1. LITTLE INTEREST OR PLEASURE IN DOING THINGS: 0
SUM OF ALL RESPONSES TO PHQ9 QUESTIONS 1 & 2: 0
2. FEELING DOWN, DEPRESSED OR HOPELESS: 0
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
## 2020-11-13 ENCOUNTER — TELEPHONE (OUTPATIENT)
Dept: INTERNAL MEDICINE CLINIC | Age: 30
End: 2020-11-13

## 2020-11-13 ASSESSMENT — ENCOUNTER SYMPTOMS
TROUBLE SWALLOWING: 0
CONSTIPATION: 0
WHEEZING: 1
COUGH: 0
NAUSEA: 0
VOMITING: 0
BACK PAIN: 0
SINUS PAIN: 0
DIARRHEA: 0
SHORTNESS OF BREATH: 0

## 2020-11-13 NOTE — TELEPHONE ENCOUNTER
Dyanne Rubinstein with [facility] is calling to report a rejected specimen:  Vaginal pathogens put in wrong tube.   Please contact Dyanne Rubinstein with questions concerns

## 2020-11-13 NOTE — TELEPHONE ENCOUNTER
Called Melina Conteh to clarify, I sent Transport Medium culture not BD AFFIRM that it needed to be sent it.  Please advise

## 2020-11-17 ENCOUNTER — TELEPHONE (OUTPATIENT)
Dept: INTERNAL MEDICINE CLINIC | Age: 30
End: 2020-11-17

## 2020-11-17 RX ORDER — NIFEDIPINE 30 MG/1
30 TABLET, FILM COATED, EXTENDED RELEASE ORAL DAILY
Qty: 30 TABLET | Refills: 3 | Status: SHIPPED | OUTPATIENT
Start: 2020-11-17 | End: 2021-03-16

## 2020-11-17 NOTE — TELEPHONE ENCOUNTER
Lucho, that was certainly a possibility. Let's switch to another medication. Nifedipine extended release 30mg daily  This med will require some dose titration. Glad headaches are some ebtter. Hopefully new medication will also be helpful for headaches and blood pressure.

## 2020-11-17 NOTE — TELEPHONE ENCOUNTER
Patient started on Atenolol 11/12 and since she started taking it makes her very sleepy. Please call to discuss her concerns at number provided.

## 2020-11-18 LAB
C TRACH DNA GENITAL QL NAA+PROBE: NEGATIVE
N. GONORRHOEAE DNA: NEGATIVE

## 2020-11-24 ENCOUNTER — TELEPHONE (OUTPATIENT)
Dept: INTERNAL MEDICINE CLINIC | Age: 30
End: 2020-11-24

## 2020-11-24 NOTE — TELEPHONE ENCOUNTER
Patient is requesting to know if she can take Nifedipine and Atenolol together? Please contact patient @ phone # provided.

## 2020-12-12 ENCOUNTER — HOSPITAL ENCOUNTER (EMERGENCY)
Age: 30
Discharge: HOME | End: 2020-12-12
Attending: EMERGENCY MEDICINE
Payer: COMMERCIAL

## 2020-12-12 VITALS
SYSTOLIC BLOOD PRESSURE: 143 MMHG | RESPIRATION RATE: 16 BRPM | TEMPERATURE: 99.2 F | WEIGHT: 266.5 LBS | OXYGEN SATURATION: 98 % | HEART RATE: 78 BPM | DIASTOLIC BLOOD PRESSURE: 100 MMHG | BODY MASS INDEX: 44.35 KG/M2

## 2020-12-12 PROCEDURE — 99282 EMERGENCY DEPT VISIT SF MDM: CPT

## 2020-12-12 PROCEDURE — 6360000002 HC RX W HCPCS: Performed by: EMERGENCY MEDICINE

## 2020-12-12 PROCEDURE — 2500000003 HC RX 250 WO HCPCS: Performed by: EMERGENCY MEDICINE

## 2020-12-12 PROCEDURE — 90715 TDAP VACCINE 7 YRS/> IM: CPT | Performed by: EMERGENCY MEDICINE

## 2020-12-12 PROCEDURE — 12001 RPR S/N/AX/GEN/TRNK 2.5CM/<: CPT

## 2020-12-12 PROCEDURE — 90471 IMMUNIZATION ADMIN: CPT

## 2020-12-12 RX ADMIN — TETANUS TOXOID, REDUCED DIPHTHERIA TOXOID AND ACELLULAR PERTUSSIS VACCINE, ADSORBED 0.5 ML: 5; 2.5; 8; 8; 2.5 SUSPENSION INTRAMUSCULAR at 19:38

## 2020-12-12 RX ADMIN — LIDOCAINE HYDROCHLORIDE 30 ML: 10 INJECTION, SOLUTION EPIDURAL; INFILTRATION; INTRACAUDAL; PERINEURAL at 19:37

## 2020-12-12 ASSESSMENT — PAIN SCALES - GENERAL: PAINLEVEL_OUTOF10: 0

## 2020-12-13 NOTE — ED NOTES
Patient prepared for and ready to be discharged. Patient discharged at this time in no acute distress after verbalizing understanding of discharge instructions. Patient left after receiving After Visit Summary instructions.         Ann Hamilton RN  12/12/20 7345

## 2020-12-13 NOTE — ED PROVIDER NOTES
[address]  EMERGENCY DEPARTMENTENCOUNTER      Pt Name: Harpreet Caro  MRN: [id number]  [place] 1990  Date ofevaluation: 12/12/2020  Provider: Libia Sheffield MD    CHIEF COMPLAINT       Chief Complaint   Patient presents with    Laceration       HPI    HISTORY OF PRESENT ILLNESS   (Location/Symptom, Timing/Onset,Context/Setting, Quality, Duration, Modifying Factors, Severity)  Note limiting factors. Harpreet Caro is a 31 y.o. female who presents to the emergency department with a laceration. This is a 51-year-old female who lacerated her right index finger on a metal can prior to arrival.  She denies any other complaints. NursingNotes were reviewed. Review of Systems    REVIEW OF SYSTEMS    (2-9 systems for level 4, 10 or more for level 5)     Review of Systems   Constitutional: Negative for fever. HENT: Negative for rhinorrhea and sore throat. Eyes: Negative for redness. Respiratory: Negative for shortness of breath. Cardiovascular: Negative for chest pain. Gastrointestinal: Negative for abdominal pain. Genitourinary: Negative for flank pain. Neurological: Negative for headaches. Hematological: Negative for adenopathy. Psychiatric/Behavioral: Negative for confusion. Except as noted above the remainder of the review of systems was reviewed and negative.        PAST MEDICAL HISTORY     Past Medical History:   Diagnosis Date    Asthma     Diabetes mellitus ([address]     Dysmenorrhea     Hyperlipidemia     Lactose intolerance     Menstrual irregularity     Obesity     Tobacco dependence          SURGICALHISTORY       Past Surgical History:   Procedure Laterality Date    FRACTURE SURGERY      HIP SURGERY Bilateral     for dislocation - ? 2006/2007         CURRENT MEDICATIONS       Previous Medications    ALBUTEROL SULFATE HFA (PROAIR HFA) 108 (90 BASE) MCG/ACT INHALER    Inhale 2 puffs into the lungs every 6 hours as needed for Wheezing    METFORMIN (GLUCOPHAGE-XR) 500 MG EXTENDED RELEASE TABLET    Take 1 tablet by mouth nightly    NAPROXEN (EC NAPROSYN) 500 MG EC TABLET    Take 500 mg by mouth 2 times daily (with meals)    NIFEDIPINE (ADALAT CC) 30 MG EXTENDED RELEASE TABLET    Take 1 tablet by mouth daily       ALLERGIES     Codeine and Lactose    FAMILY HISTORY       Family History   Problem Relation Age of Onset    Cancer Father     Hypertension Father     Hypertension Maternal Aunt           SOCIAL HISTORY       Social History     Socioeconomic History    Marital status: Single     Spouse name: None    Number of children: None    Years of education: None    Highest education level: None   Occupational History    None   Social Needs    Financial resource strain: None    Food insecurity     Worry: None     Inability: None    Transportation needs     Medical: None     Non-medical: None   Tobacco Use    Smoking status: Current Some Day Smoker     Packs/day: 0.10     Types: Cigarettes     Last attempt to quit: 3/26/2018     Years since quittin.7    Smokeless tobacco: Former User     Quit date: 2017   Substance and Sexual Activity    Alcohol use:  Yes     Alcohol/week: 0.8 standard drinks     Types: 1 Standard drinks or equivalent per week     Comment: once a week    Drug use: No    Sexual activity: Yes     Partners: Male   Lifestyle    Physical activity     Days per week: None     Minutes per session: None    Stress: None   Relationships    Social connections     Talks on phone: None     Gets together: None     Attends Zoroastrian service: None     Active member of club or organization: None     Attends meetings of clubs or organizations: None     Relationship status: None    Intimate partner violence     Fear of current or ex partner: None     Emotionally abused: None     Physically abused: None     Forced sexual activity: None   Other Topics Concern    None   Social History Narrative    None       SCREENINGS PHYSICAL EXAM    (up to 7 for level 4, 8 or more for level 5)     ED Triage Vitals [12/12/20 1912]   BP Temp Temp Source Pulse Resp SpO2 Height Weight   (!) 143/100 99.2 °F (37.3 °C) Oral 78 16 98 % -- 266 lb 8 oz (120.9 kg)       Physical Exam:      General Appearance:  Alert, cooperative, appears stated age. Head:  Normocephalic, without obvious abnormality, atraumatic. Eyes:  conjunctiva/corneas clear, EOM's intact. Sclera anicteric. ENT:  Mucous remains are moist and pink   Neck: Supple, symmetrical, trachea midline, no adenopathy. No jugular venous distention. Chest Wall:     Heart:   Genitourinary:    Abdomen:      Extremities:  Small 2 cm laceration on the distal aspect of her right index finger. It was linear. There were no foreign objects noted. There is no nail damage noted. Pulses:  Good throughout. Nice venous capillary bleeding. Skin:  No rashes or lesions to exposed skin. Neurologic: Alert and oriented X 3. DIAGNOSTIC RESULTS         RADIOLOGY:   Non-plain filmimages such as CT, Ultrasound and MRI are read by the radiologist. Plain radiographic images are visualized and preliminarily interpreted by the emergency physician with the below findings:    See below    Interpretation per the Radiologist below, if available at the time ofthis note: All incidental findings were discussed with the patient. No orders to display         ED BEDSIDE ULTRASOUND:   Performed by ED Physician - none    LABS:  Labs Reviewed - No data to display    All other labs were within normal range or not returned as of this dictation. EMERGENCY DEPARTMENT COURSE and DIFFERENTIAL DIAGNOSIS/MDM:   Vitals:    Vitals:    12/12/20 1912   BP: (!) 143/100   Pulse: 78   Resp: 16   Temp: 99.2 °F (37.3 °C)   TempSrc: Oral   SpO2: 98%   Weight: 266 lb 8 oz (120.9 kg)           MDM    The patient has remained stable throughout her hospital course.   After minutes evaluation the patient was given a dig block with lidocaine without epinephrine. I repaired the area with 4 interrupted percutaneous 4-0 Ethilon sutures. Good hemostasis and anatomy was obtained. She was discharged with wound care instructions and suture removal in 7 to 10 days. She is to keep good eye on her blood sugar. REASSESSMENT              CONSULTS:  None    PROCEDURES:  Unless otherwise noted below, none     Procedures   A digital block was applied to the right index finger using lidocaine without epinephrine. The 2 cm laceration was repaired with 4 interrupted percutaneous 4-0 Ethilon sutures. The patient tolerated the procedure well. Good hemostasis and negatives obtained. A dressing was applied. The wound was explored for foreign objects and there were none seen. FINAL IMPRESSION      1. Laceration of right index finger without foreign body without damage to nail, initial encounter          DISPOSITION/PLAN   DISPOSITION Decision To Discharge 12/12/2020 08:29:20 PM      PATIENT REFERREDTO:  Zay Oakley MD  [address]  [phone]    Call in 1 week  For suture removal      DISCHARGEMEDICATIONS:  New Prescriptions    No medications on file     Controlled Substances Monitoring:     No flowsheet data found.     (Please note that portions of this note were completed with a voice recognition program.  Efforts were made to edit the dictations but occasionally words are mis-transcribed.)    Rosie Stephen MD (electronically signed)  Attending Emergency Physician          Rosie Stephen MD  12/12/20 2032

## 2020-12-19 ENCOUNTER — HOSPITAL ENCOUNTER (EMERGENCY)
Age: 30
Discharge: HOME | End: 2020-12-19
Attending: EMERGENCY MEDICINE
Payer: COMMERCIAL

## 2020-12-19 VITALS
OXYGEN SATURATION: 99 % | RESPIRATION RATE: 14 BRPM | WEIGHT: 266 LBS | TEMPERATURE: 98.4 F | SYSTOLIC BLOOD PRESSURE: 152 MMHG | DIASTOLIC BLOOD PRESSURE: 90 MMHG | HEART RATE: 98 BPM | HEIGHT: 65 IN | BODY MASS INDEX: 44.32 KG/M2

## 2020-12-19 PROCEDURE — 99283 EMERGENCY DEPT VISIT LOW MDM: CPT

## 2020-12-20 NOTE — ED PROVIDER NOTES
[facility] EMERGENCY DEPT VISIT      Patient Identification  Veronica Rivera is a 31 y.o. female. Chief Complaint   Suture / Staple Removal (Needs Stitches removed to right index finger tip. )      History of Present Illness: This is a  31 y.o. female who presents ambulatory  to the ED with complaints of being sutures removed from her right index finger. Patient had these placed 1 week ago after she cut her finger on a can. She has had no fever. No drainage. No redness. No dehiscence. It just feels tight. Past Medical History:   Diagnosis Date    Asthma     Diabetes mellitus ([address]     Dysmenorrhea     Hyperlipidemia     Lactose intolerance     Menstrual irregularity     Obesity     Tobacco dependence        Past Surgical History:   Procedure Laterality Date    FRACTURE SURGERY      HIP SURGERY Bilateral     for dislocation - ? 2006/2007       No current facility-administered medications for this encounter. Current Outpatient Medications:     NIFEdipine (ADALAT CC) 30 MG extended release tablet, Take 1 tablet by mouth daily, Disp: 30 tablet, Rfl: 3    naproxen (EC NAPROSYN) 500 MG EC tablet, Take 500 mg by mouth 2 times daily (with meals), Disp: , Rfl:     metFORMIN (GLUCOPHAGE-XR) 500 MG extended release tablet, Take 1 tablet by mouth nightly, Disp: 30 tablet, Rfl: 5    albuterol sulfate HFA (PROAIR HFA) 108 (90 Base) MCG/ACT inhaler, Inhale 2 puffs into the lungs every 6 hours as needed for Wheezing, Disp: 1 Inhaler, Rfl: 3    Allergies   Allergen Reactions    Codeine Anaphylaxis, Swelling and Other (See Comments)     Throat swelling.      Lactose Nausea And Vomiting       Social History     Socioeconomic History    Marital status: Single     Spouse name: Not on file    Number of children: Not on file    Years of education: Not on file    Highest education level: Not on file   Occupational History    Not on file   Social Needs    Financial resource strain: Not on file   [place] insecurity     Worry: Not on file     Inability: Not on file    Transportation needs     Medical: Not on file     Non-medical: Not on file   Tobacco Use    Smoking status: Current Some Day Smoker     Packs/day: 0.10     Types: Cigarettes     Last attempt to quit: 3/26/2018     Years since quittin.7    Smokeless tobacco: Former User     Quit date: 2017   Substance and Sexual Activity    Alcohol use: Yes     Alcohol/week: 0.8 standard drinks     Types: 1 Standard drinks or equivalent per week     Comment: once a week    Drug use: No    Sexual activity: Yes     Partners: Male   Lifestyle    Physical activity     Days per week: Not on file     Minutes per session: Not on file    Stress: Not on file   Relationships    Social connections     Talks on phone: Not on file     Gets together: Not on file     Attends [employer] service: Not on file     Active member of club or organization: Not on file     Attends meetings of clubs or organizations: Not on file     Relationship status: Not on file    Intimate partner violence     Fear of current or ex partner: Not on file     Emotionally abused: Not on file     Physically abused: Not on file     Forced sexual activity: Not on file   Other Topics Concern    Not on file   Social History Narrative    Not on file       Nursing Notes Reviewed      ROS:  General: no fever  Musculoskeletal: no arthralgia, no myalgia,   no joint swelling  NEURO: no numbness, no weakness  DERM: no rash, no erythema, no ecchymosis, + wounds      PHYSICAL EXAM:  GENERAL APPEARANCE: Macarena Edilia is in no acute respiratory distress. Awake and alert. VITAL SIGNS:   ED Triage Vitals [20]   Enc Vitals Group      BP (!) 152/90      Pulse 98      Resp 14      Temp 98.4 °F (36.9 °C)      Temp Source Oral      SpO2 99 %      Weight 266 lb (120.7 kg)      Height 5' 5\" (1.651 m)      Head Circumference       Peak Flow       Pain Score       Pain Loc       Pain Edu? Excl.  in GC?      HEAD: Normocephalic, atraumatic. EYES:  Extraocular muscles are intact. Conjunctivas are pink. Negative scleral icterus. ENT:  Mucous membranes are moist.   NECK: Nontender and supple. CHEST:normal effort  HEART:  Regular rate and rhythm. MUSCULOSKELETAL:  Active range of motion of the upper and lower extremities. No edema. NEUROLOGICAL: Awake, alert and oriented x 3. Power intact in the upper and lower extremities. DERMATOLOGIC: No petechiae, rashes, or ecchymoses. Wound to pulp of right index finger with 4 sutures in place. No dehiscence. No drainage. No erythema. No swelling. Wound edges appear well opposed. ED COURSE AND MEDICAL DECISION MAKING:      Labs:  No results found for this visit on 12/19/20. Treatment in the department:  4 sutures were removed easily from finger    Medical decision making:  Here for suture removal. No signs of infection. Wound edges well opposed and remained intact after removal of sutures    Clinical Impression:  1. Encounter for removal of sutures        Dispo:  Patient will be discharged  at this time. Patient was informed of this decision and agrees with plan. I have discussed lab and xray findings with patient and they understand. Questions were answered to the best of my ability. Discharge vitals:  Blood pressure (!) 152/90, pulse 98, temperature 98.4 °F (36.9 °C), temperature source Oral, resp. rate 14, height 5' 5\" (1.651 m), weight 266 lb (120.7 kg), SpO2 99 %, not currently breastfeeding. Prescriptions given:   New Prescriptions    No medications on file         This chart was created using dragon voice recognition software.         Roby Higgins MD  12/19/20 1958

## 2020-12-21 ENCOUNTER — VIRTUAL VISIT (OUTPATIENT)
Dept: INTERNAL MEDICINE CLINIC | Age: 30
End: 2020-12-21
Payer: COMMERCIAL

## 2020-12-21 PROCEDURE — 99214 OFFICE O/P EST MOD 30 MIN: CPT | Performed by: INTERNAL MEDICINE

## 2020-12-21 PROCEDURE — G8427 DOCREV CUR MEDS BY ELIG CLIN: HCPCS | Performed by: INTERNAL MEDICINE

## 2020-12-21 PROCEDURE — 2022F DILAT RTA XM EVC RTNOPTHY: CPT | Performed by: INTERNAL MEDICINE

## 2020-12-21 PROCEDURE — 3052F HG A1C>EQUAL 8.0%<EQUAL 9.0%: CPT | Performed by: INTERNAL MEDICINE

## 2020-12-21 RX ORDER — ERGOCALCIFEROL 1.25 MG/1
50000 CAPSULE ORAL WEEKLY
Qty: 4 CAPSULE | Refills: 1 | Status: SHIPPED | OUTPATIENT
Start: 2020-12-21 | End: 2021-01-11

## 2020-12-21 RX ORDER — ALBUTEROL SULFATE 90 UG/1
2 AEROSOL, METERED RESPIRATORY (INHALATION) EVERY 6 HOURS PRN
Qty: 1 INHALER | Refills: 11 | Status: SHIPPED | OUTPATIENT
Start: 2020-12-21 | End: 2021-10-18

## 2020-12-21 ASSESSMENT — ENCOUNTER SYMPTOMS
SINUS PAIN: 0
EYE PAIN: 0
SHORTNESS OF BREATH: 0
WHEEZING: 0
BACK PAIN: 0
TROUBLE SWALLOWING: 0
VOMITING: 0
COUGH: 0
PHOTOPHOBIA: 0
NAUSEA: 0
COLOR CHANGE: 0
CONSTIPATION: 0
SINUS PRESSURE: 0
DIARRHEA: 1
ABDOMINAL PAIN: 0

## 2020-12-21 NOTE — PROGRESS NOTES
2020    TELEHEALTH EVALUATION -- Audio/Visual (During EAHOJ-68 public health emergency)  Dayanara Leavitt MD  [address] [facility]    HPI:    Scottie Rapp (:  1990) has requested an audio/video evaluation for the following concern(s):    Not tolerating the metformin great - waking her up to poop. Taking it at night. Wants to keep going though because trying to stya healthy. Last A1C was 8.9%. Trying to stay active and eat healthy. Headaches are better with the nifedipine. Almost 100% better. Difficulty sleeping - tried melatonin 10mg. Going to sleep at 930-10pm usually. Difficulty falling asleep. Also difficulty with waking up in the middle of the night and not being able to fall back asleep. Not drinking a lot of caffeine. Already doing meditation before bed. Has been more anxious lately and feels that plays a part. Currently moving and trying to find a job. Review of Systems   Constitutional: Negative for appetite change, fatigue, fever and unexpected weight change. No night sweats   HENT: Negative for congestion, ear pain, hearing loss, nosebleeds, sinus pressure, sinus pain, sneezing, tinnitus and trouble swallowing. Eyes: Negative for photophobia, pain and visual disturbance. Respiratory: Negative for cough, shortness of breath and wheezing. Cardiovascular: Negative for chest pain, palpitations and leg swelling. Gastrointestinal: Positive for diarrhea. Negative for abdominal pain, constipation, nausea and vomiting. Endocrine: Negative for cold intolerance, heat intolerance, polydipsia, polyphagia and polyuria. Genitourinary: Negative for difficulty urinating, dysuria, frequency, hematuria and urgency. Musculoskeletal: Negative for arthralgias, back pain, joint swelling, myalgias and neck pain. Skin: Negative for color change and rash. Allergic/Immunologic: Negative for environmental allergies, food allergies and immunocompromised state. Neurological: Negative for dizziness, syncope, speech difficulty, weakness, light-headedness, numbness and headaches. Hematological: Negative for adenopathy. Does not bruise/bleed easily. Psychiatric/Behavioral: Positive for sleep disturbance. Negative for dysphoric mood. The patient is nervous/anxious. Prior to Visit Medications    Medication Sig Taking? Authorizing Provider   albuterol sulfate HFA (PROAIR HFA) 108 (90 Base) MCG/ACT inhaler Inhale 2 puffs into the lungs every 6 hours as needed for Wheezing Yes Kwan Lucero MD   NIFEdipine (ADALAT CC) 30 MG extended release tablet Take 1 tablet by mouth daily Yes Kwan Lucero MD   naproxen (EC NAPROSYN) 500 MG EC tablet Take 500 mg by mouth 2 times daily (with meals) Yes Historical Provider, MD   metFORMIN (GLUCOPHAGE-XR) 500 MG extended release tablet Take 1 tablet by mouth nightly Yes Kwan Lucero MD       Social History     Tobacco Use    Smoking status: Current Some Day Smoker     Packs/day: 0.10     Types: Cigarettes     Last attempt to quit: 3/26/2018     Years since quittin.7    Smokeless tobacco: Former User     Quit date: 2017   Substance Use Topics    Alcohol use:  Yes     Alcohol/week: 0.8 standard drinks     Types: 1 Standard drinks or equivalent per week     Comment: once a week    Drug use: No        Past Medical History:   Diagnosis Date    Asthma     Diabetes mellitus ([facility] [place] [address]     Dysmenorrhea     Hyperlipidemia     Lactose intolerance     Menstrual irregularity     Obesity     Tobacco dependence    ,   Past Surgical History:   Procedure Laterality Date    FRACTURE SURGERY      HIP SURGERY Bilateral     for dislocation - ? /   ,   Social History     Tobacco Use    Smoking status: Current Some Day Smoker     Packs/day: 0.10     Types: Cigarettes     Last attempt to quit: 3/26/2018     Years since quittin.7    Smokeless tobacco: Former User     Quit date: 2017   Substance Use Topics  Alcohol use: Yes     Alcohol/week: 0.8 standard drinks     Types: 1 Standard drinks or equivalent per week     Comment: once a week    Drug use: No   ,   Family History   Problem Relation Age of Onset    Cancer Father     Hypertension Father     Hypertension Maternal Aunt        PHYSICAL EXAMINATION:    General - well developed, well nourished, NAD  Mental status - Awake and alert, Oriented x 3. Follows commands. Eyes - EOMI, Sclera normal, No conjunctival discharge or injection  HENT: NCAT, MMM. Normal external ears  Neck - no visualized masses, nl ROM  Pulmonary/Chest - Speaking in full sentences, effort normal, no distress. MSK - Normal gait with no signs of ataxia. Neuro - No facial asymmetry, no gaze palzy  Skin - no rashes or discoloration of facial skin  Psych - nl appearance and grooming, affect appropriate to mood, no hallucinations  Other pertinent observable physical exam findings-       ASSESSMENT/PLAN:  1. Uncontrolled type 2 diabetes mellitus with hyperglycemia (HCC)  Continue metformin XR 500mg daily despite side effects. She is not going to tolerate higher doses. May need more medication pending A1C reading at nexxt visit. 2. Morbid obesity with BMI of 40.0-44.9, adult ([facility] [address]  Encouraged healthy diet and exercise    3. Psychophysiologic insomnia  New, uncontrolled. Likely anxiety related. Melatonin trial discussed - appropriate way to take. Will also get sleep handout to her. Continue mindfulness/meditation practices. 4. Moderate persistent asthma without complication  Stable. Continue prn albuterol.   - albuterol sulfate HFA (PROAIR HFA) 108 (90 Base) MCG/ACT inhaler; Inhale 2 puffs into the lungs every 6 hours as needed for Wheezing  Dispense: 1 Inhaler; Refill: 11    5. Chronic posttraumatic headache  Improved on nifedipine. May be improved BP control as well. Tolerating medication fine. Return in about 8 weeks (around 2/15/2021) for DM2 f/u. Dallas Patricia is a 31 y.o. female being evaluated by a Virtual Visit (video visit) encounter to address concerns as mentioned above. A caregiver was present when appropriate. Due to this being a TeleHealth encounter (During XFQNL-67 public health emergency), evaluation of the following organ systems was limited: Vitals/Constitutional/EENT/Resp/CV/GI//MS/Neuro/Skin/Heme-Lymph-Imm. Pursuant to the emergency declaration under the [address] [place] [address] and the [employer] Act, this Virtual Visit was conducted with patient's (and/or legal guardian's) consent, to reduce the patient's risk of exposure to COVID-19 and provide necessary medical care. The patient (and/or legal guardian) has also been advised to contact this office for worsening conditions or problems, and seek emergency medical treatment and/or call 911 if deemed necessary. Patient identification was verified at the start of the visit: YES    Total time spent on this encounter: Not billed by time    Services were provided through a video synchronous discussion virtually to substitute for in-person clinic visit. Patient and provider were located at their individual homes. --Diana Britton MD on 12/21/2020 at 2:02 PM    An electronic signature was used to authenticate this note.

## 2021-01-09 ENCOUNTER — HOSPITAL ENCOUNTER (EMERGENCY)
Age: 31
Discharge: HOME | End: 2021-01-09
Payer: COMMERCIAL

## 2021-01-09 ENCOUNTER — NURSE TRIAGE (OUTPATIENT)
Dept: OTHER | Facility: CLINIC | Age: 31
End: 2021-01-09

## 2021-01-09 ENCOUNTER — APPOINTMENT (OUTPATIENT)
Dept: GENERAL RADIOLOGY | Age: 31
End: 2021-01-09
Payer: COMMERCIAL

## 2021-01-09 VITALS
HEART RATE: 78 BPM | HEIGHT: 65 IN | BODY MASS INDEX: 43.75 KG/M2 | SYSTOLIC BLOOD PRESSURE: 138 MMHG | WEIGHT: 262.57 LBS | TEMPERATURE: 99.1 F | OXYGEN SATURATION: 99 % | RESPIRATION RATE: 16 BRPM | DIASTOLIC BLOOD PRESSURE: 82 MMHG

## 2021-01-09 DIAGNOSIS — Z20.822 SUSPECTED COVID-19 VIRUS INFECTION: Primary | ICD-10-CM

## 2021-01-09 LAB
RAPID INFLUENZA  B AGN: NEGATIVE
RAPID INFLUENZA A AGN: NEGATIVE

## 2021-01-09 PROCEDURE — U0003 INFECTIOUS AGENT DETECTION BY NUCLEIC ACID (DNA OR RNA); SEVERE ACUTE RESPIRATORY SYNDROME CORONAVIRUS 2 (SARS-COV-2) (CORONAVIRUS DISEASE [COVID-19]), AMPLIFIED PROBE TECHNIQUE, MAKING USE OF HIGH THROUGHPUT TECHNOLOGIES AS DESCRIBED BY CMS-2020-01-R: HCPCS

## 2021-01-09 PROCEDURE — 71045 X-RAY EXAM CHEST 1 VIEW: CPT

## 2021-01-09 PROCEDURE — 99284 EMERGENCY DEPT VISIT MOD MDM: CPT

## 2021-01-09 PROCEDURE — 87804 INFLUENZA ASSAY W/OPTIC: CPT

## 2021-01-09 ASSESSMENT — PAIN DESCRIPTION - DESCRIPTORS: DESCRIPTORS: ACHING

## 2021-01-09 ASSESSMENT — ENCOUNTER SYMPTOMS
ABDOMINAL PAIN: 0
VOMITING: 0
COUGH: 1
NAUSEA: 0
CHEST TIGHTNESS: 0

## 2021-01-09 ASSESSMENT — PAIN DESCRIPTION - PROGRESSION: CLINICAL_PROGRESSION: GRADUALLY WORSENING

## 2021-01-09 ASSESSMENT — PAIN DESCRIPTION - PAIN TYPE
TYPE: ACUTE PAIN
TYPE: ACUTE PAIN

## 2021-01-09 ASSESSMENT — PAIN SCALES - GENERAL
PAINLEVEL_OUTOF10: 3
PAINLEVEL_OUTOF10: 9

## 2021-01-09 ASSESSMENT — PAIN DESCRIPTION - ONSET: ONSET: ON-GOING

## 2021-01-09 ASSESSMENT — PAIN DESCRIPTION - ORIENTATION: ORIENTATION: OTHER (COMMENT)

## 2021-01-09 ASSESSMENT — PAIN DESCRIPTION - FREQUENCY: FREQUENCY: CONTINUOUS

## 2021-01-09 NOTE — ED NOTES
Pt to ED with c/o \"feeling bad\" since Wed\". Pt states she has generalized body aches, headache, fatigue, sob and cough, symptoms started 3 days ago.   Pt requesting covid test.       Anthony Umanzor RN  01/09/21 3073

## 2021-01-09 NOTE — TELEPHONE ENCOUNTER
Reason for Disposition   MILD difficulty breathing (e.g., minimal/no SOB at rest, SOB with walking, pulse <100)    Answer Assessment - Initial Assessment Questions  1. COVID-19 DIAGNOSIS: \"Who made your Coronavirus (COVID-19) diagnosis? \" \"Was it confirmed by a positive lab test?\" If not diagnosed by a HCP, ask \"Are there lots of cases (community spread) where you live? \" (See public health department website, if unsure)      Not yet tested    2. COVID-19 EXPOSURE: \"Was there any known exposure to COVID before the symptoms began? \" [facility] Definition of close contact: within 6 feet (2 meters) for a total of 15 minutes or more over a 24-hour period. Unsure of exposure    3. ONSET: \"When did the COVID-19 symptoms start?\"       1/6/2021    4. WORST SYMPTOM: \"What is your worst symptom? \" (e.g., cough, fever, shortness of breath, muscle aches)      Body aches    5. COUGH: \"Do you have a cough? \" If so, ask: \"How bad is the cough? \"        \"mild cough\"    6. FEVER: \"Do you have a fever? \" If so, ask: \"What is your temperature, how was it measured, and when did it start? \"      Denies    7. RESPIRATORY STATUS: \"Describe your breathing? \" (e.g., shortness of breath, wheezing, unable to speak)      Mild SOB- worse when ambulating    8. BETTER-SAME-WORSE: Verline Leyden you getting better, staying the same or getting worse compared to yesterday? \"  If getting worse, ask, \"In what way? \"      Worse- bc headache started today    9. HIGH RISK DISEASE: \"Do you have any chronic medical problems? \" (e.g., asthma, heart or lung disease, weak immune system, obesity, etc.)      Asthma, diabetic    10. PREGNANCY: \"Is there any chance you are pregnant? \" \"When was your last menstrual period? \"        N/a    11. OTHER SYMPTOMS: \"Do you have any other symptoms? \"  (e.g., chills, fatigue, headache, loss of smell or taste, muscle pain, sore throat; new loss of smell or taste especially support the diagnosis of COVID-19)        Fatigue, headache, body aches, joint pain, SOB, chest discomfort    Protocols used: CORONAVIRUS (COVID-19) DIAGNOSED OR SUSPECTED-ADULT-AH    [facility] [place] [address] [facility] Brief description of triage: See above note    Triage indicates for patient to: See disposition    Care advice provided. Recommended using local department of health website for testing locations and up to date information. Caller verbalizes understanding, denies any other questions or concerns; instructed to call back for any new or worsening symptoms.

## 2021-01-09 NOTE — ED NOTES
Discharge and education instructions reviewed. Patient verbalized understanding, teach-back successful. Patient denied questions at this time. No acute distress noted. Patient instructed to follow-up as noted - return to emergency department if symptoms worsen. Patient verbalized understanding. Discharged per EDMD with discharge instructions.         Louis Hamilton RN  01/09/21 9662

## 2021-01-09 NOTE — ED PROVIDER NOTES
[address]  [address] [address]  Dept: [phone]  Loc: [phone]  eMERGENCYdEPARTMENT eNCOUnter      Pt Name: Shadia Pérez  MRN: [id number]  Myagfnika 1990  Date of evaluation: 1/9/2021  Provider:Karolina Oliver PA-C    CHIEF COMPLAINT       Chief Complaint   Patient presents with    Concern For COVID-19     patient states \"feeling bad\" since Wed\". + body aches, headache, fatigue, cough. requests COVID testing.  Shortness of Breath       CRITICAL CARE TIME   Total Critical Care time was 0 minutes, excluding separately reportable procedures. There was a high probability of clinically significant/life threatening deterioration in the patient's condition which required my urgentintervention. HISTORY OF PRESENT ILLNESS  (Location/Symptom, Timing/Onset, Context/Setting, Quality, Duration,Modifying Factors, Severity.)   Shadia Pérez is a 31 y.o. female who presents to the emergency department by private vehicle complaining of body aches, fatigue, cough, generalized ill feeling. Patient concerned she has COVID-19 and requesting testing. No definitive COVID-19 exposure. She denies any recorded fevers at home. States she has had loss of sense of smell, denies loss of sense of taste. States she does get short of breath when walking around. Denies any shortness of breath at rest.  Past no history of asthma and diabetes. No complaints this time. Nursing Notes were reviewedand agreed with or any disagreements were addressed in the HPI. REVIEW OF SYSTEMS    (2-9 systems for level 4, 10 or more for level 5)     Review of Systems   Constitutional: Positive for activity change, appetite change and fatigue. Negative for chills. HENT:        See HPI   Respiratory: Positive for cough. Negative for chest tightness. Gastrointestinal: Negative for abdominal pain, nausea and vomiting.    Musculoskeletal: Positive for arthralgias and myalgias. Skin: Negative. Neurological: Negative. Psychiatric/Behavioral: Negative for behavioral problems and confusion. Except as noted above the remainder of the review of systems was reviewed and negative. PAST MEDICAL HISTORY         Diagnosis Date    Asthma     Diabetes mellitus ([address]     Dysmenorrhea     Hyperlipidemia     Lactose intolerance     Menstrual irregularity     Obesity     Tobacco dependence        SURGICAL HISTORY           Procedure Laterality Date    FRACTURE SURGERY      HIP SURGERY Bilateral     for dislocation - ?        CURRENT MEDICATIONS     [unfilled]    ALLERGIES     Codeine and Lactose    FAMILY HISTORY           Problem Relation Age of Onset    Cancer Father     Hypertension Father     Hypertension Maternal Aunt      Family Status   Relation Name Status    Mother  Alive    Father      Deloris  (Not Specified)        SOCIAL HISTORY      reports that she has been smoking cigarettes. She has been smoking about 0.10 packs per day. She quit smokeless tobacco use about 3 years ago. She reports current alcohol use of about 0.8 standard drinks of alcohol per week. She reports that she does not use drugs. PHYSICAL EXAM    (up to 7 for level 4, 8 or more for level 5)     ED Triage Vitals [21 1533]   Enc Vitals Group      BP (!) 149/94      Pulse 93      Resp 18      Temp 97.8 °F (36.6 °C)      Temp Source Temporal      SpO2 98 %      Weight 262 lb 9.1 oz (119.1 kg)      Height 5' 5\" (1.651 m)      Head Circumference       Peak Flow       Pain Score       Pain Loc       Pain Edu? Excl. in [address]? Physical Exam  Vitals signs reviewed. Constitutional:       Appearance: Normal appearance. HENT:      Head: Normocephalic and atraumatic. Neck:      Musculoskeletal: Normal range of motion and neck supple. Cardiovascular:      Rate and Rhythm: Normal rate and regular rhythm.    Pulmonary: Effort: Pulmonary effort is normal. No respiratory distress. Breath sounds: Normal breath sounds. No stridor. No wheezing, rhonchi or rales. Abdominal:      Palpations: Abdomen is soft. Tenderness: There is no abdominal tenderness. Musculoskeletal: Normal range of motion. Neurological:      General: No focal deficit present. Mental Status: She is alert and oriented to person, place, and time. Psychiatric:         Mood and Affect: Mood normal.         Behavior: Behavior normal.           DIAGNOSTIC RESULTS     EKG: All EKG's are interpreted by the Emergency Department Physician who either signs or Co-signs this chart in the absence of a cardiologist.    RADIOLOGY:   Non-plain film images such as CT, Ultrasound and MRI are read by the radiologist. Plain radiographic images are visualized and preliminarilyinterpreted by the emergency physician with the below findings:    Interpretation per the Radiologist below,if available at the time of this note:    XR CHEST PORTABLE   Final Result   No acute process. LABS:  Labs Reviewed   RAPID INFLUENZA A/B ANTIGENS    Narrative:     Performed at:  [address] [address]   Phone [phone]       All other labs were within normal range or not returned as of this dictation. EMERGENCY DEPARTMENT COURSE and DIFFERENTIAL DIAGNOSIS/MDM:   Vitals:    Vitals:    01/09/21 1533 01/09/21 1600 01/09/21 1815   BP: (!) 149/94 (!) 147/89 138/82   Pulse: 93 84 78   Resp: 18 18 16   Temp: 97.8 °F (36.6 °C) 100 °F (37.8 °C) 99.1 °F (37.3 °C)   TempSrc: Temporal Oral Oral   SpO2: 98% 98% 99%   Weight: 262 lb 9.1 oz (119.1 kg)     Height: 5' 5\" (1.651 m)         MDM     Patient presents ED with HPI noted above. Temperature 100 on arrival.  She is nontachycardic. She is nontoxic-appearing. Oxygen saturation 90% on room air. Patient has fatigue with exertion.   Not hypoxic in the ED. Chest x-ray showed no acute process. Does have generalized body aches. No definitive COVID-19 exposure. Patient concern for COVID-19, this prompted evaluation in the ED. Patient requesting testing. COVID-19 pending. Influenza negative. Patient told to quarantine pending test results. Do suspect COVID or other viral illness. Treatment is symptomatic at this point. Patient was told to return the ED should she have any trouble breathing, chest pain, shortness of breath. She voiced understanding. She understands return precautions. She was discharged home in stable condition. The patient tolerated their visit well. I have discussed the findings of today's workup with the patient and addressed the patient's questions and concerns. Important warning signs as well as new or worsening symptoms which would necessitate immediate return to the ED were discussed. The plan is to discharge from the ED at this time, and the patient is in stable condition. The patient acknowledged understanding is agreeable with this plan. CONSULTS:  None    PROCEDURES:  Procedures    FINAL IMPRESSION      1. Suspected COVID-19 virus infection          DISPOSITION/PLAN   [unfilled]    PATIENT REFERRED TO:  [facility] Emergency Department  [address] [id number]  [phone]  Go to   If symptoms worsen    Davis Nam MD  [address] [place] [address]  [phone]    Call   For follow up and reevaluation.       DISCHARGE MEDICATIONS:  Discharge Medication List as of 1/9/2021  6:15 PM          (Please note that portions of this note were completed with a voice recognition program.  Efforts were made to edit the dictations but occasionally words are mis-transcribed.)    ROLO Fitch, [place]  01/09/21 2023

## 2021-01-10 LAB — SARS-COV-2: NOT DETECTED

## 2021-01-11 DIAGNOSIS — E55.9 VITAMIN D DEFICIENCY: ICD-10-CM

## 2021-01-11 RX ORDER — ERGOCALCIFEROL 1.25 MG/1
CAPSULE ORAL
Qty: 4 CAPSULE | Refills: 1 | Status: SHIPPED | OUTPATIENT
Start: 2021-01-11 | End: 2021-02-08

## 2021-02-08 DIAGNOSIS — E55.9 VITAMIN D DEFICIENCY: ICD-10-CM

## 2021-02-08 RX ORDER — ERGOCALCIFEROL 1.25 MG/1
CAPSULE ORAL
Qty: 4 CAPSULE | Refills: 1 | Status: SHIPPED | OUTPATIENT
Start: 2021-02-08 | End: 2021-03-11

## 2021-02-25 ENCOUNTER — OFFICE VISIT (OUTPATIENT)
Dept: INTERNAL MEDICINE CLINIC | Age: 31
End: 2021-02-25
Payer: COMMERCIAL

## 2021-02-25 VITALS
TEMPERATURE: 97.9 F | SYSTOLIC BLOOD PRESSURE: 118 MMHG | BODY MASS INDEX: 43.6 KG/M2 | RESPIRATION RATE: 14 BRPM | OXYGEN SATURATION: 98 % | WEIGHT: 262 LBS | DIASTOLIC BLOOD PRESSURE: 80 MMHG | HEART RATE: 72 BPM

## 2021-02-25 DIAGNOSIS — F51.04 PSYCHOPHYSIOLOGIC INSOMNIA: Primary | ICD-10-CM

## 2021-02-25 DIAGNOSIS — E55.9 VITAMIN D DEFICIENCY: ICD-10-CM

## 2021-02-25 DIAGNOSIS — J45.40 MODERATE PERSISTENT ASTHMA WITHOUT COMPLICATION: ICD-10-CM

## 2021-02-25 DIAGNOSIS — E11.65 UNCONTROLLED TYPE 2 DIABETES MELLITUS WITH HYPERGLYCEMIA (HCC): ICD-10-CM

## 2021-02-25 DIAGNOSIS — G44.329 CHRONIC POST-TRAUMATIC HEADACHE, NOT INTRACTABLE: ICD-10-CM

## 2021-02-25 LAB — HBA1C MFR BLD: 9.9 %

## 2021-02-25 PROCEDURE — 4004F PT TOBACCO SCREEN RCVD TLK: CPT | Performed by: INTERNAL MEDICINE

## 2021-02-25 PROCEDURE — G8417 CALC BMI ABV UP PARAM F/U: HCPCS | Performed by: INTERNAL MEDICINE

## 2021-02-25 PROCEDURE — 3046F HEMOGLOBIN A1C LEVEL >9.0%: CPT | Performed by: INTERNAL MEDICINE

## 2021-02-25 PROCEDURE — G8484 FLU IMMUNIZE NO ADMIN: HCPCS | Performed by: INTERNAL MEDICINE

## 2021-02-25 PROCEDURE — 83036 HEMOGLOBIN GLYCOSYLATED A1C: CPT | Performed by: INTERNAL MEDICINE

## 2021-02-25 PROCEDURE — G8427 DOCREV CUR MEDS BY ELIG CLIN: HCPCS | Performed by: INTERNAL MEDICINE

## 2021-02-25 PROCEDURE — 99214 OFFICE O/P EST MOD 30 MIN: CPT | Performed by: INTERNAL MEDICINE

## 2021-02-25 PROCEDURE — 2022F DILAT RTA XM EVC RTNOPTHY: CPT | Performed by: INTERNAL MEDICINE

## 2021-02-25 RX ORDER — LANCETS 28 GAUGE
1 EACH MISCELLANEOUS DAILY
Qty: 100 EACH | Refills: 11 | Status: SHIPPED | OUTPATIENT
Start: 2021-02-25 | End: 2021-03-02

## 2021-02-25 RX ORDER — BLOOD-GLUCOSE METER
1 KIT MISCELLANEOUS DAILY
Qty: 1 DEVICE | Refills: 0 | Status: SHIPPED | OUTPATIENT
Start: 2021-02-25 | End: 2021-03-02

## 2021-02-25 RX ORDER — MULTIVIT-MIN/IRON/FOLIC ACID/K 18-600-40
2000 CAPSULE ORAL DAILY
Qty: 30 CAPSULE | Refills: 11 | Status: SHIPPED | OUTPATIENT
Start: 2021-02-25 | End: 2021-03-11 | Stop reason: ALTCHOICE

## 2021-02-25 RX ORDER — BLOOD SUGAR DIAGNOSTIC
1 STRIP MISCELLANEOUS DAILY
Qty: 100 STRIP | Refills: 11 | Status: SHIPPED | OUTPATIENT
Start: 2021-02-25 | End: 2021-03-02

## 2021-02-25 RX ORDER — AMITRIPTYLINE HYDROCHLORIDE 25 MG/1
25 TABLET, FILM COATED ORAL NIGHTLY
Qty: 30 TABLET | Refills: 5 | Status: SHIPPED | OUTPATIENT
Start: 2021-02-25 | End: 2021-06-17

## 2021-02-25 ASSESSMENT — PATIENT HEALTH QUESTIONNAIRE - PHQ9
2. FEELING DOWN, DEPRESSED OR HOPELESS: 0
SUM OF ALL RESPONSES TO PHQ QUESTIONS 1-9: 0
SUM OF ALL RESPONSES TO PHQ QUESTIONS 1-9: 0

## 2021-02-25 NOTE — PATIENT INSTRUCTIONS
[facility] [address]  [phone]    Jacobo Macey, [address] [facility]  [facility] [address]  [phone]    Ada June, DDS  [facility]  [address]  [phone], [address] [facility]  [address] [place] [address]. [facility] [address]  [facility]  [phone]

## 2021-02-25 NOTE — PROGRESS NOTES
Adriano Chauhan (:  1990) is a 31 y.o. female,Established patient, here for evaluation of the following chief complaint(s):  Diabetes and Insomnia (trouble falling asleep and staying asleep )      ASSESSMENT/PLAN:  1. Psychophysiologic insomnia  Trial amitriptyline. -     amitriptyline (ELAVIL) 25 MG tablet; Take 1 tablet by mouth nightly, Disp-30 tablet, R-5Normal  2. Moderate persistent asthma without complication  Stable by exam and symptom review. 3. Uncontrolled type 2 diabetes mellitus with hyperglycemia [facility])  Lab Results   Component Value Date    LABA1C 9.9 2021     Lab Results   Component Value Date    .2 2013     A1C uncontrolled today. Will add [facility] [place] [facility] [place] (if covered by insurance) and continue metformin. Discussed lifesytle changes. Encouraged her to schedule with [facility]. -     POCT glycosylated hemoglobin (Hb A1C)  -     SITagliptin (JANUVIA) 100 MG tablet; Take 1 tablet by mouth daily, Disp-30 tablet, R-5Normal  4. Vitamin D deficiency  -     Vitamin D, Cholecalciferol, 50 MCG ( UT) CAPS; Take 2,000 Units by mouth daily, Disp-30 capsule, R-11Normal  5. Chronic post-traumatic headache  Improved with nifedipine and better BP control. Return in about 3 months (around 2021) for chronic conditions. SUBJECTIVE/OBJECTIVE:  HPI    Not sleeping well at night. Difficulty falling asleep and then will only sleep about 4 hours at a time and then cannot fall back asleep. Not sure if anything is waking her up. Tries to set good bedtime habits. No current anxiety or depression issues. Has never taken anything for sleep    Breathing is stable. She is taking her metformin as rx'ed. Diet and exercise have not been as good lately. Headaches and blood pressure improved with adding nifedipine. Review of Systems   Constitutional: Positive for fatigue. Negative for chills and fever. Respiratory: Negative for cough, shortness of breath and wheezing. Cardiovascular: Negative for chest pain. Endocrine: Negative for polydipsia and polyuria. Psychiatric/Behavioral: Positive for sleep disturbance. Negative for dysphoric mood. The patient is not nervous/anxious. Current Outpatient Medications on File Prior to Visit   Medication Sig Dispense Refill    vitamin D (ERGOCALCIFEROL) 1.25 MG (35730 UT) CAPS capsule TAKE 1 CAPSULE BY MOUTH ONE TIME PER WEEK 4 capsule 1    albuterol sulfate HFA (PROAIR HFA) 108 (90 Base) MCG/ACT inhaler Inhale 2 puffs into the lungs every 6 hours as needed for Wheezing 1 Inhaler 11    NIFEdipine (ADALAT CC) 30 MG extended release tablet Take 1 tablet by mouth daily 30 tablet 3    naproxen (EC NAPROSYN) 500 MG EC tablet Take 500 mg by mouth 2 times daily (with meals)      metFORMIN (GLUCOPHAGE-XR) 500 MG extended release tablet Take 1 tablet by mouth nightly 30 tablet 5     No current facility-administered medications on file prior to visit. Vitals:    02/25/21 1437   BP: 118/80   Pulse: 72   Resp: 14   Temp: 97.9 °F (36.6 °C)   SpO2: 98%     Physical Exam  Constitutional:       General: She is not in acute distress. Appearance: Normal appearance. She is not diaphoretic. HENT:      Head: Normocephalic and atraumatic. Right Ear: External ear normal.      Left Ear: External ear normal.      Nose: Nose normal.      Mouth/Throat:      Pharynx: Oropharynx is clear. Eyes:      General: No scleral icterus. Conjunctiva/sclera: Conjunctivae normal.   Neck:      Musculoskeletal: Normal range of motion. Cardiovascular:      Rate and Rhythm: Normal rate and regular rhythm. Pulses: Normal pulses. Heart sounds: Normal heart sounds. No murmur. No friction rub. No gallop. Pulmonary:      Effort: Pulmonary effort is normal.      Breath sounds: Normal breath sounds. No wheezing, rhonchi or rales. Abdominal:      General: Abdomen is flat.  Bowel sounds are normal.   Musculoskeletal: General: No deformity. Right lower leg: No edema. Left lower leg: No edema. Skin:     General: Skin is warm and dry. Findings: No rash. Neurological:      General: No focal deficit present. Mental Status: She is alert. Coordination: Coordination normal.      Gait: Gait normal.   Psychiatric:         Mood and Affect: Mood normal.         Behavior: Behavior normal.                 An electronic signature was used to authenticate this note.     --Fadumo Martinez MD

## 2021-03-01 ENCOUNTER — TELEPHONE (OUTPATIENT)
Dept: ADMINISTRATIVE | Age: 31
End: 2021-03-01

## 2021-03-01 ENCOUNTER — VIRTUAL VISIT (OUTPATIENT)
Dept: INTERNAL MEDICINE CLINIC | Age: 31
End: 2021-03-01

## 2021-03-01 DIAGNOSIS — E78.2 MIXED HYPERLIPIDEMIA: ICD-10-CM

## 2021-03-01 DIAGNOSIS — E66.01 MORBID OBESITY WITH BMI OF 40.0-44.9, ADULT (HCC): ICD-10-CM

## 2021-03-01 DIAGNOSIS — E11.65 UNCONTROLLED TYPE 2 DIABETES MELLITUS WITH HYPERGLYCEMIA (HCC): Primary | ICD-10-CM

## 2021-03-01 NOTE — PATIENT INSTRUCTIONS
BEHAVIOR GOALS     When to eat: Eat first meal within an hour of waking, then have meal or snack every five hours while awake. What and how much to eat:   1) Plan meals to follow Plate Guide, including vegetables, fruits, and whole grains daily.   2) Use water or non-Caloric beverages instead of juice or regular soda    Physical Activity: All or most days of the week    Checking Blood Sugar: check with insurer for covered meter/strips    Medications: try re-starting Metformin to develop tolerance

## 2021-03-01 NOTE — PROGRESS NOTES
Medical Nutrition Therapy for Diabetes    Shadia Pérez  March 1, 2021      Patient Care Team:  Jose Morton MD as PCP - General (Internal Medicine)  Jose Morton MD as PCP - [facility] Empaneled Provider  Nyla Suárez MD as Obstetrician (Obstetrics & Gynecology)    Reason for visit: VIRTUAL VISIT -  Diabetes    ASSESSMENT/PLAN:   NUTRITION DIAGNOSIS    #1 Problem: Altered Nutrition-Related Laboratory Values (NC-2.2)  Related to: Endocrine/Diabetes   As Evidenced by: Elevated Plasma glucose and/or HgbA1c levels           #2 Problem: Altered Nutrition-Related Laboratory Values (NC-2.2)  Related to: Cardiac/Lipid status  As Evidenced by: Elevated serum LDL Cholesterol and Triglycerides      #3 Problem: Overweight/Obesity (NC-3.3)  Related to: Excessive energy intake or physical inactivity  As Evidenced by: BMI more than normative standard for age and sex (BMI=43.60)      NUTRITION INTERVENTION  Nutrition Prescription: Aim for 30 g Carb per meal; 15 - 20 g Carb per snack      Diabetes Education/Counseling included:  Carbohydrate Control, Activity/exercise, Monitoring and Medications    Interventions:  Control Carbohydrate Intake using Plate Guide, Increase intake of vegetables, Increase intake of whole grains and Promote weight reduction by choosing lean meats and limiting high calorie foods    NUTRITION MONITORING AND EVALUATION    Patient Instructions   BEHAVIOR GOALS     When to eat: Eat first meal within an hour of waking, then have meal or snack every five hours while awake. What and how much to eat:   1) Plan meals to follow Plate Guide, including vegetables, fruits, and whole grains daily.   2) Use water or non-Caloric beverages instead of juice or regular soda    Physical Activity: All or most days of the week    Checking Blood Sugar: check with insurer for covered meter/strips    Medications: try re-starting Metformin to develop tolerance                  Patient Active Problem List   Diagnosis  Menstrual irregularity / AMENORRHEA    Insomnia    Allergic rhinitis    Hyperlipidemia    Asthma, moderate    Vitamin D deficiency    Tinea pedis of both feet    Microalbuminuria    Paresthesia of both feet - toes    Tinea pedis of right foot    Tobacco dependence    Psoriasis    Dysmenorrhea    Onychomycosis    Morbid obesity with BMI of 40.0-44.9, adult ([facility])    Uncontrolled type 2 diabetes mellitus with hyperglycemia ([facility])       Current Outpatient Medications   Medication Sig Dispense Refill    amitriptyline (ELAVIL) 25 MG tablet Take 1 tablet by mouth nightly 30 tablet 5    Vitamin D, Cholecalciferol, 50 MCG (2000 UT) CAPS Take 2,000 Units by mouth daily 30 capsule 11    Blood Glucose Monitoring Suppl (FREESTYLE LITE) SHANE 1 Device by Does not apply route daily 1 Device 0    FreeStyle Lancets MISC 1 each by Does not apply route daily 100 each 11    blood glucose test strips (FREESTYLE TEST STRIPS) strip 1 each by Other route daily 100 strip 11    SITagliptin (JANUVIA) 100 MG tablet Take 1 tablet by mouth daily 30 tablet 5    vitamin D (ERGOCALCIFEROL) 1.25 MG (87833 UT) CAPS capsule TAKE 1 CAPSULE BY MOUTH ONE TIME PER WEEK 4 capsule 1    albuterol sulfate HFA (PROAIR HFA) 108 (90 Base) MCG/ACT inhaler Inhale 2 puffs into the lungs every 6 hours as needed for Wheezing 1 Inhaler 11    NIFEdipine (ADALAT CC) 30 MG extended release tablet Take 1 tablet by mouth daily 30 tablet 3    naproxen (EC NAPROSYN) 500 MG EC tablet Take 500 mg by mouth 2 times daily (with meals)      metFORMIN (GLUCOPHAGE-XR) 500 MG extended release tablet Take 1 tablet by mouth nightly 30 tablet 5     No current facility-administered medications for this visit.           NUTRITION ASSESSMENT    Biochemical Data:    Lab Results   Component Value Date    LABA1C 9.9 02/25/2021     Lab Results   Component Value Date    .2 12/05/2013       Lab Results   Component Value Date    CHOL 243 (H) 12/16/2020 CHOL 218 (H) 07/03/2019    CHOL 190 08/13/2018     Lab Results   Component Value Date    TRIG 299 (H) 12/16/2020    TRIG 134 07/03/2019    TRIG 132 08/13/2018     Lab Results   Component Value Date    HDL 61 (H) 12/16/2020    HDL 58 07/03/2019    HDL 63 (H) 08/13/2018     Lab Results   Component Value Date    LDLCALC 122 (H) 12/16/2020    LDLCALC 133 (H) 07/03/2019    LDLCALC 101 (H) 08/13/2018     Lab Results   Component Value Date    LABVLDL 60 12/16/2020    LABVLDL 27 07/03/2019    LABVLDL 26 08/13/2018     No results found for: CHOLHDLRATIO    Lab Results   Component Value Date    WBC 7.3 12/16/2020    HGB 13.0 12/16/2020    HCT 38.4 12/16/2020    MCV 87.2 12/16/2020     12/16/2020       Lab Results   Component Value Date    CREATININE 0.7 12/16/2020    BUN 6 (L) 12/16/2020     12/16/2020    K 3.9 12/16/2020     12/16/2020    CO2 22 12/16/2020       Diabetes Medications: metformin  Knows name and dose of prescribed medications Yes  Knows prescribed schedule for medications: Yes but not taking due to GI upset  Recent change in medication type/dosage: new Rx for [employer]  medications properlyYes  Comments:     Monitoring:   Has BG meter: No--says insurance didn't cover meter and strips  Hypoglycemia? No    Anthropometric Measurements:   Wt:   Wt Readings from Last 3 Encounters:   02/25/21 262 lb (118.8 kg)   01/09/21 262 lb 9.1 oz (119.1 kg)   12/19/20 266 lb (120.7 kg)      BMI:   BMI Readings from Last 3 Encounters:   02/25/21 43.60 kg/m²   01/09/21 43.69 kg/m²   12/19/20 44.26 kg/m²   recently lost weight  Patient's stated goal weight: 175 lb  7% Weight loss goal weight: 244 lb    Physical Activity History:   Physical activity: dog walking, dancing  Considers herself  \"very active\"    Sleep: states she doesn't get \"a lot of sleep\"  Dr. Juan Benedict gave Rx for sleep aide    Food and Nutrition History:   Nutrition Awareness/Previous DSMES: none Number of people in household: 2 - does shopping with Mom, patient does cooking  Frequency of Meals Eaten away from home:1 - 2 (subway or [employer])  Food Availability Problems  Within the past 12 months, have you worried that your food would run out before you got money to buy more? Yes  Within the past 12 months, has the food you bought not lasted till the end of the month and you didn't have money to get more? No  Beverage consumption: water, pop/juice (gingerale, pepsi, orange juice, cranberry juice)  Alcohol consumption: twice per week: wine or liquor  Usual Food consumption:   Recently cut out fried foods, uses olive oil instead of veg oil, more greens in food  FOOD RECALL  Up at 10:30am - water    First meal--Usual time: noon  Today: \"cheated\" leftover turkey burger, water  Different Day: eggs (2), 1 slice toast, water, grapes    Snack  Orange Juice (8 oz)  Cheddar cheese popcorn    Second meal--Usual time: 7p  Recent: leftover Chinese (fried rice, shrimp, carrots).  12 oz pepsi  Different Day:2 white chicken tacos (lettuce, sour cream, small flour tortilla)    Snack  none    Motivational Interviewing       Barriers:   -financial          Follow Up Plan: two weeks    Referring Provider: Demetra Steel MD  Time spent with patient: 60 minutes

## 2021-03-01 NOTE — TELEPHONE ENCOUNTER
Submitted PA for FreeStyle Lite Test strips. Key: [id number] - Rx #: [id number]. Via [facility]MM STATUS: DENIED. Will scan letter when received. Please notify patient, thank you.

## 2021-03-02 DIAGNOSIS — E11.65 UNCONTROLLED TYPE 2 DIABETES MELLITUS WITH HYPERGLYCEMIA (HCC): Primary | ICD-10-CM

## 2021-03-02 RX ORDER — BLOOD-GLUCOSE METER
1 EACH MISCELLANEOUS DAILY
Qty: 1 KIT | Refills: 0 | Status: SHIPPED | OUTPATIENT
Start: 2021-03-02

## 2021-03-02 RX ORDER — LANCING DEVICE/LANCETS
1 KIT MISCELLANEOUS DAILY
Qty: 1 EACH | Refills: 0 | Status: SHIPPED | OUTPATIENT
Start: 2021-03-02

## 2021-03-02 RX ORDER — BLOOD SUGAR DIAGNOSTIC
1 STRIP MISCELLANEOUS DAILY
Qty: 100 EACH | Refills: 11 | Status: SHIPPED | OUTPATIENT
Start: 2021-03-02

## 2021-03-02 RX ORDER — LANCETS 33 GAUGE
1 EACH MISCELLANEOUS DAILY
Qty: 100 EACH | Refills: 11 | Status: SHIPPED | OUTPATIENT
Start: 2021-03-02

## 2021-03-03 PROBLEM — G44.329 CHRONIC POST-TRAUMATIC HEADACHE, NOT INTRACTABLE: Status: ACTIVE | Noted: 2021-03-03

## 2021-03-03 ASSESSMENT — ENCOUNTER SYMPTOMS
SHORTNESS OF BREATH: 0
COUGH: 0
WHEEZING: 0

## 2021-03-14 DIAGNOSIS — E11.65 UNCONTROLLED TYPE 2 DIABETES MELLITUS WITH HYPERGLYCEMIA (HCC): ICD-10-CM

## 2021-03-15 RX ORDER — METFORMIN HYDROCHLORIDE 500 MG/1
TABLET, EXTENDED RELEASE ORAL
Qty: 90 TABLET | Refills: 1 | Status: SHIPPED | OUTPATIENT
Start: 2021-03-15 | End: 2021-09-09

## 2021-03-16 DIAGNOSIS — G44.329 CHRONIC POST-TRAUMATIC HEADACHE, NOT INTRACTABLE: ICD-10-CM

## 2021-03-16 DIAGNOSIS — R03.0 BLOOD PRESSURE ELEVATED WITHOUT HISTORY OF HTN: ICD-10-CM

## 2021-03-16 RX ORDER — NIFEDIPINE 30 MG/1
TABLET, FILM COATED, EXTENDED RELEASE ORAL
Qty: 90 TABLET | Refills: 1 | Status: SHIPPED | OUTPATIENT
Start: 2021-03-16 | End: 2021-09-09

## 2021-03-23 ENCOUNTER — CARE COORDINATION (OUTPATIENT)
Dept: CARE COORDINATION | Age: 31
End: 2021-03-23

## 2021-03-24 NOTE — CARE COORDINATION
independently?: Yes     Current Housing: Apartment        Per the Fall Risk Screening, did the patient have 2 or more falls or 1 fall with injury in the past year?: No     Frequent urination at night?: No  Do you use rails/bars?: No  Do you have a non-slip tub mat?: No        Thinking about your patient's physical health needs, are there any symptoms or problems (risk indicators) you are unsure about that require further investigation?: No identified areas of uncertainly or problems already being investigated   Are the patients physical health problems impacting on their mental well-being?: No identified areas of concern   Are there any problems with your patients lifestyle behaviors (alcohol, drugs, diet, exercise) that are impacting on physical or mental well-being?: No identified areas of concern   Do you have any other concerns about your patients mental well-being?  How would you rate their severity and impact on the patient?: No identified areas of concern   How would you rate their home environment in terms of safety and stability (including domestic violence, insecure housing, neighbor harassment)?: Consistently safe, supportive, stable, no identified problems   How do daily activities impact on the patient's well-being? (include current or anticipated unemployment, work, caregiving, access to transportation or other): No identified problems or perceived positive benefits   How would you rate their social network (family, work, friends)?: Good participation with social networks   How would you rate their financial resources (including ability to afford all required medical care)?: Financially secure, resources adequate, no identified problems   How wells does the patient now understand their health and well-being (symptoms, signs or risk factors) and what they need to do to manage their health?: Reasonable to good understanding and already engages in managing health or is willing to undertake better lungs every 6 hours as needed for Wheezing 12/21/20   Maryam Figueroa MD   naproxen (EC NAPROSYN) 500 MG EC tablet Take 500 mg by mouth 2 times daily (with meals)    Historical Provider, MD       Future Appointments   Date Time Provider Whitney العليi   4/5/2021  1:30 PM Darrell Kidd RD, LD [facility]   5/27/2021  2:30 PM Maryam Figueroa MD [facility]   8/26/2021  4:00 PM [facility]     , ACC: Yasmeen Gómez RN  CM Risk Score: 10  Charlson 10 Year Mortality Risk Score: 10%     Care Coordination Interventions    Program Enrollment: Complex Care  Referral from Primary Care Provider: No  Suggested Interventions and Community Resources       ,   Diabetes Assessment    Medic Alert ID: No  Meal Planning: Plate Method   How often do you test your blood sugar?: No Testing   Do you have barriers with adherence to non-pharmacologic self-management interventions?  (Nutrition/Exercise/Self-Monitoring): No   Have you ever had to go to the ED for symptoms of low blood sugar?: No       No patient-reported symptoms       and   General Assessment    Do you have any symptoms that are causing concern?: No  Progression since Onset: Gradually Worsening, Unchanged

## 2021-04-13 ENCOUNTER — CARE COORDINATION (OUTPATIENT)
Dept: CARE COORDINATION | Age: 31
End: 2021-04-13

## 2021-04-14 NOTE — CARE COORDINATION
Ambulatory Care Coordination Note  4/14/2021  CM Risk Score: 10  Charlson 10 Year Mortality Risk Score: 10%     ACC: Alfonso Greco RN    Summary Note:   Call to patient  Reports received carb counting today! Reviewed index of foods and carb count info  Unfortunately missed appt w RD due to being out of town, would like to reschedule. FBG   T/o day, highest 140  Walking dog daily    Plan     will call to schedule w RD  Will cont outreach            Care Coordination Interventions    Program Enrollment: Complex Care  Referral from Primary Care Provider: No  Suggested Interventions and Community Resources  Diabetes Education: Completed (Comment: 3/1 Brent Zavala)  Registered Dietician: Completed (Comment: 3/1 Brent Zavala)         Goals Addressed    None         Prior to Admission medications    Medication Sig Start Date End Date Taking?  Authorizing Provider   NIFEdipine (ADALAT CC) 30 MG extended release tablet TAKE 1 TABLET BY MOUTH EVERY DAY 3/16/21   Stacie Duke MD   metFORMIN (GLUCOPHAGE-XR) 500 MG extended release tablet TAKE 1 TABLET BY MOUTH EVERY DAY AT NIGHT 3/15/21   Stacie Duke MD   Cholecalciferol 50 MCG (2000 UT) TABS Take 1 tablet by mouth daily 3/11/21   Stacie Duke MD   OneTouch Delica Lancets 69F MISC 1 each by Does not apply route daily 3/2/21   Stacie Duke MD   Lancet Devices (ONE TOUCH DELICA LANCING DEV) MISC 1 Device by Does not apply route daily 3/2/21   Stacie Duke MD   Blood Glucose Monitoring Suppl (ONE TOUCH ULTRA 2) w/Device KIT 1 kit by Does not apply route daily 3/2/21   Stacie Duke MD   blood glucose test strips (ONETOUCH ULTRA) strip 1 each by In Vitro route daily 3/2/21   Stacie Duke MD   amitriptyline (ELAVIL) 25 MG tablet Take 1 tablet by mouth nightly 2/25/21   Stacie Duke MD   SITagliptin (JANUVIA) 100 MG tablet Take 1 tablet by mouth daily 2/25/21   Stacie Duke MD   vitamin D (ERGOCALCIFEROL) 1.25 MG (88947 UT) CAPS capsule TAKE 1 CAPSULE BY MOUTH ONE TIME PER WEEK 2/8/21 3/11/21  Moriah Ruano MD   albuterol sulfate HFA (PROAIR HFA) 108 (90 Base) MCG/ACT inhaler Inhale 2 puffs into the lungs every 6 hours as needed for Wheezing 12/21/20   Moriah Ruano MD   naproxen (EC NAPROSYN) 500 MG EC tablet Take 500 mg by mouth 2 times daily (with meals)    Historical Provider, MD       Future Appointments   Date Time Provider Whitney Andre   5/27/2021  2:30 PM Moriah Ruano MD [facility]   8/26/2021  4:00 PM Yaneli Aguero MD [facility] GYN [facility]

## 2021-05-01 ENCOUNTER — HOSPITAL ENCOUNTER (EMERGENCY)
Age: 31
Discharge: HOME | End: 2021-05-01
Payer: COMMERCIAL

## 2021-05-01 VITALS
TEMPERATURE: 98.2 F | BODY MASS INDEX: 44.08 KG/M2 | RESPIRATION RATE: 16 BRPM | WEIGHT: 264.55 LBS | HEIGHT: 65 IN | OXYGEN SATURATION: 99 % | SYSTOLIC BLOOD PRESSURE: 138 MMHG | DIASTOLIC BLOOD PRESSURE: 78 MMHG | HEART RATE: 85 BPM

## 2021-05-01 DIAGNOSIS — M62.830 LUMBAR PARASPINAL MUSCLE SPASM: Primary | ICD-10-CM

## 2021-05-01 PROCEDURE — 6370000000 HC RX 637 (ALT 250 FOR IP): Performed by: PHYSICIAN ASSISTANT

## 2021-05-01 PROCEDURE — 99283 EMERGENCY DEPT VISIT LOW MDM: CPT

## 2021-05-01 RX ORDER — LIDOCAINE 4 G/G
1 PATCH TOPICAL DAILY
Status: DISCONTINUED | OUTPATIENT
Start: 2021-05-01 | End: 2021-05-01 | Stop reason: HOSPADM

## 2021-05-01 RX ORDER — CYCLOBENZAPRINE HCL 5 MG
5-10 TABLET ORAL 3 TIMES DAILY PRN
Qty: 15 TABLET | Refills: 0 | Status: SHIPPED | OUTPATIENT
Start: 2021-05-01 | End: 2021-05-11

## 2021-05-01 RX ORDER — NAPROXEN 250 MG/1
500 TABLET ORAL ONCE
Status: COMPLETED | OUTPATIENT
Start: 2021-05-01 | End: 2021-05-01

## 2021-05-01 RX ADMIN — NAPROXEN 500 MG: 250 TABLET ORAL at 17:23

## 2021-05-01 ASSESSMENT — PAIN - FUNCTIONAL ASSESSMENT
PAIN_FUNCTIONAL_ASSESSMENT: PREVENTS OR INTERFERES SOME ACTIVE ACTIVITIES AND ADLS
PAIN_FUNCTIONAL_ASSESSMENT: 0-10

## 2021-05-01 ASSESSMENT — ENCOUNTER SYMPTOMS
VOMITING: 0
BACK PAIN: 1
CHEST TIGHTNESS: 0
SHORTNESS OF BREATH: 0
NAUSEA: 0

## 2021-05-01 ASSESSMENT — PAIN SCALES - GENERAL: PAINLEVEL_OUTOF10: 9

## 2021-05-01 ASSESSMENT — PAIN DESCRIPTION - LOCATION: LOCATION: BACK

## 2021-05-01 ASSESSMENT — PAIN DESCRIPTION - FREQUENCY: FREQUENCY: INTERMITTENT

## 2021-05-01 NOTE — ED NOTES
D/C: Order noted for d/c. Pt confirmed d/c paperwork and one prescription has correct name. Discharge and education instructions reviewed with patient. Teach-back successful. Pt verbalized understanding and signed d/c papers. Pt denied questions at this time. No acute distress noted. Patient instructed to follow-up as noted - return to emergency department if symptoms worsen. Patient verbalized understanding. Discharged per EDMD with discharge instructions. Pt discharged to private vehicle. Patient stable upon departure. Thanked patient for choosing [facility] for care. Provider aware of patient pain at time of discharge.        Rosa Gutierrez RN  05/01/21 1528

## 2021-05-01 NOTE — ED PROVIDER NOTES
Physical Exam  Vitals signs reviewed. Constitutional:       Appearance: Normal appearance. HENT:      Head: Normocephalic and atraumatic. Neck:      Musculoskeletal: Normal range of motion and neck supple. Pulmonary:      Effort: Pulmonary effort is normal. No respiratory distress. Musculoskeletal:        Back:       Comments: BLE: Knee flexion/extension +5/5, dorsiflexion/plantarflexion of ankle/foot/great toe +5/5, flexion and abduction hip +5 bilaterally, patella reflexes +2, sensation intact and equal bilaterally, dorsalis pedis pulses +2   Skin:     General: Skin is warm. Neurological:      General: No focal deficit present. Mental Status: She is alert and oriented to person, place, and time. Psychiatric:         Mood and Affect: Mood normal.         Behavior: Behavior normal.           DIAGNOSTIC RESULTS     EKG: All EKG's are interpreted by the Emergency Department Physician who either signs or Co-signs this chart in the absence of a cardiologist.    RADIOLOGY:   Non-plain film images such as CT, Ultrasound and MRI are read by the radiologist. Plain radiographic images are visualized and preliminarilyinterpreted by the emergency physician with the below findings:    Interpretation per the Radiologist below,if available at the time of this note:    No orders to display         LABS:  Labs Reviewed - No data to display    All other labs were within normal range or not returned as of this dictation. EMERGENCY DEPARTMENT COURSE and DIFFERENTIAL DIAGNOSIS/MDM:   Vitals:    Vitals:    05/01/21 1627   BP: (!) 142/82   Pulse: 88   Resp: 16   Temp: 98.2 °F (36.8 °C)   TempSrc: Temporal   SpO2: 99%   Weight: 264 lb 8.8 oz (120 kg)   Height: 5' 5\" (1.651 m)       MDM     Patient presents ED with HPI noted above. She is hemodynamically stable, afebrile and nontoxic-appearing. She is not hypoxic with oxygen saturation of 99% on room air. Physical exam as above.   Patient with palpable spasm to left paralumbar region. She has no midline spinous process tenderness. No traumatic injury to back. Pain all reproducible with movement and palpation on exam.  No red flags regarding back pain, see HPI above. She is neurologic intact in bilateral lower extremities. Do not believe x-ray or further events imaging indicated at this time. She was given Naprosyn and Lidoderm patch in the ED. Was discharged home with a muscle relaxer. Told not to drive, very vehicle drink alcohol while taking. She was educated concerning symptoms that should prompt reevaluation in the ED regarding back pain. She is comfortable plan. Encourage follow-up with PCP in 3 to 5 days reevaluation. Discharged home in stable condition. The patient tolerated their visit well. I have discussed the findings of today's workup with the patient and addressed the patient's questions and concerns. Important warning signs as well as new or worsening symptoms which would necessitate immediate return to the ED were discussed. The plan is to discharge from the ED at this time, and the patient is in stable condition. The patient acknowledged understanding is agreeable with this plan. CONSULTS:  None    PROCEDURES:  Procedures    FINAL IMPRESSION      1. Lumbar paraspinal muscle spasm          DISPOSITION/PLAN   [unfilled]    PATIENT REFERRED TO:  [facility] Emergency Department  [address] [address]  [phone]  Go to   If symptoms worsen    Dennise Hyman MD  [address] [place] [address], [address]  [phone]    Call in 2 days  For follow up and reevaluation.       DISCHARGE MEDICATIONS:  New Prescriptions    CYCLOBENZAPRINE (FLEXERIL) 5 MG TABLET    Take 1-2 tablets by mouth 3 times daily as needed for Muscle spasms       (Please note that portions of this note were completed with a voice recognition program.  Efforts were made to edit the dictations but occasionally words are mis-transcribed.)    [address] [facility], [place]         [address], [place]  05/01/21 0383

## 2021-05-03 ENCOUNTER — CARE COORDINATION (OUTPATIENT)
Dept: CARE COORDINATION | Age: 31
End: 2021-05-03

## 2021-05-03 NOTE — CARE COORDINATION
Call to patient as follow up from ER   NO answer. LM on VM with this ACM name and number.     Will continue outreach

## 2021-05-04 ENCOUNTER — CARE COORDINATION (OUTPATIENT)
Dept: CARE COORDINATION | Age: 31
End: 2021-05-04

## 2021-05-04 NOTE — CARE COORDINATION
Ambulatory Care Coordination  ED Follow up Call    Reason for ED visit:  Lumbar paraspinal muscle spasm   Status:     improved    Did you call your PCP prior to going to the ED? No      Did you receive a discharge instructions from the Emergency Room? Yes  Review of Instructions:     Understands what to report/when to return?:  Yes   Understands discharge instructions?:  Yes   Following discharge instructions?:  Yes   If not why? Are there any new complaints of pain? No  New Pain Meds? N/A    Constipation prophylaxis needed? N/A    If you have a wound is the dressing clean, dry, and intact? N/A  Understands wound care regimen? N/A    Are there any other complaints/concerns that you wish to tell your provider? FU appts/Provider:    Future Appointments   Date Time Provider Whitney Andre   5/27/2021  2:30 PM Aldo Reddy MD [facility]   8/26/2021  4:00 PM Mathew Waddell MD [facility]           New Medications?:   Yes      Medication Reconciliation by phone - No, pt was at moms moving SMALL items to help her move  Understands Medications? Yes  Taking Medications? Yes  Can you swallow your pills? Yes    Any further needs in the home i.e. Equipment? Not Applicable    Link to services in community?:  N/A   Which services:       Will continue outreach

## 2021-05-25 ENCOUNTER — CARE COORDINATION (OUTPATIENT)
Dept: INTERNAL MEDICINE CLINIC | Age: 31
End: 2021-05-25

## 2021-05-27 ENCOUNTER — OFFICE VISIT (OUTPATIENT)
Dept: INTERNAL MEDICINE CLINIC | Age: 31
End: 2021-05-27
Payer: COMMERCIAL

## 2021-05-27 VITALS
SYSTOLIC BLOOD PRESSURE: 132 MMHG | OXYGEN SATURATION: 98 % | HEART RATE: 92 BPM | BODY MASS INDEX: 43.6 KG/M2 | DIASTOLIC BLOOD PRESSURE: 80 MMHG | WEIGHT: 262 LBS | RESPIRATION RATE: 14 BRPM

## 2021-05-27 DIAGNOSIS — F51.04 PSYCHOPHYSIOLOGIC INSOMNIA: ICD-10-CM

## 2021-05-27 DIAGNOSIS — J45.40 MODERATE PERSISTENT ASTHMA WITHOUT COMPLICATION: ICD-10-CM

## 2021-05-27 DIAGNOSIS — J30.2 SEASONAL ALLERGIC RHINITIS, UNSPECIFIED TRIGGER: ICD-10-CM

## 2021-05-27 DIAGNOSIS — F17.200 TOBACCO DEPENDENCE: ICD-10-CM

## 2021-05-27 DIAGNOSIS — E11.65 UNCONTROLLED TYPE 2 DIABETES MELLITUS WITH HYPERGLYCEMIA (HCC): Primary | ICD-10-CM

## 2021-05-27 LAB — HBA1C MFR BLD: 8.7 %

## 2021-05-27 PROCEDURE — G8417 CALC BMI ABV UP PARAM F/U: HCPCS | Performed by: INTERNAL MEDICINE

## 2021-05-27 PROCEDURE — G8427 DOCREV CUR MEDS BY ELIG CLIN: HCPCS | Performed by: INTERNAL MEDICINE

## 2021-05-27 PROCEDURE — 2022F DILAT RTA XM EVC RTNOPTHY: CPT | Performed by: INTERNAL MEDICINE

## 2021-05-27 PROCEDURE — 99214 OFFICE O/P EST MOD 30 MIN: CPT | Performed by: INTERNAL MEDICINE

## 2021-05-27 PROCEDURE — 4004F PT TOBACCO SCREEN RCVD TLK: CPT | Performed by: INTERNAL MEDICINE

## 2021-05-27 PROCEDURE — 3052F HG A1C>EQUAL 8.0%<EQUAL 9.0%: CPT | Performed by: INTERNAL MEDICINE

## 2021-05-27 PROCEDURE — 83036 HEMOGLOBIN GLYCOSYLATED A1C: CPT | Performed by: INTERNAL MEDICINE

## 2021-05-27 ASSESSMENT — ENCOUNTER SYMPTOMS
VOMITING: 0
NAUSEA: 0
ABDOMINAL PAIN: 0
DIARRHEA: 0
SHORTNESS OF BREATH: 0
COUGH: 0
WHEEZING: 1

## 2021-05-27 NOTE — CARE COORDINATION
Diabetes Assessment    Medic Alert ID: No  Meal Planning: Plate Method   How often do you test your blood sugar?: No Testing   Do you have barriers with adherence to non-pharmacologic self-management interventions? (Nutrition/Exercise/Self-Monitoring): No   Have you ever had to go to the ED for symptoms of low blood sugar?: No           and   General Assessment    Do you have any symptoms that are causing concern?: No       Ambulatory Care Coordination Note  5/27/2021  CM Risk Score: 10  Charlson 10 Year Mortality Risk Score: 10%     ACC: Makeda Sun, RN    Summary Note: Patient currently at office for follow up appt   requested BG logbook  Sent message to office to look in my office for logbook for her. Reports otherwise doing well      Plan   will continue outreach        Care Coordination Interventions    Program Enrollment: Complex Care  Referral from Primary Care Provider: No  Suggested Interventions and Community Resources  Diabetes Education: Completed (Comment: 3/1 Faraz Becker)  Registered Dietician: Completed (Comment: 3/1 Faraz Becker)         Goals Addressed                 This Visit's Progress     Self Monitoring        Self-Monitored Blood Glucose - I will check my blood sugar Fasting blood sugar    None Recently Recorded    Barriers: waiting on strips  Plan for overcoming my barriers: at pharmacy  and will  today  Confidence: 9/10  Anticipated Goal Completion Date: 4/30/21 4/14/21Checking FBG and random,  fbg  , random highest 140  5/27/21= requested BG log at [facility], sent message to check in my office there            Prior to Admission medications    Medication Sig Start Date End Date Taking?  Authorizing Provider   NIFEdipine (ADALAT CC) 30 MG extended release tablet TAKE 1 TABLET BY MOUTH EVERY DAY 3/16/21   Kit Leung MD   metFORMIN (GLUCOPHAGE-XR) 500 MG extended release tablet TAKE 1 TABLET BY MOUTH EVERY DAY AT NIGHT 3/15/21   Kit Leung MD   Cholecalciferol 50 MCG (2000 UT) TABS Take 1 tablet by mouth daily 3/11/21   Luca Bashir MD   OneTouch Delica Lancets 03J MISC 1 each by Does not apply route daily 3/2/21   Luca Bashir MD   Lancet Devices (ONE TOUCH DELICA LANCING DEV) MISC 1 Device by Does not apply route daily 3/2/21   Luca Bashir MD   Blood Glucose Monitoring Suppl (ONE TOUCH ULTRA 2) w/Device KIT 1 kit by Does not apply route daily 3/2/21   Luca Bashir MD   blood glucose test strips (ONETOUCH ULTRA) strip 1 each by In Vitro route daily 3/2/21   Luca Bashir MD   amitriptyline (ELAVIL) 25 MG tablet Take 1 tablet by mouth nightly 2/25/21   Luca Bashir MD   SITagliptin (JANUVIA) 100 MG tablet Take 1 tablet by mouth daily 2/25/21   Luca Bashir MD   vitamin D (ERGOCALCIFEROL) 1.25 MG (97958 UT) CAPS capsule TAKE 1 CAPSULE BY MOUTH ONE TIME PER WEEK 2/8/21 3/11/21  Luca Bashir MD   albuterol sulfate HFA (PROAIR HFA) 108 (90 Base) MCG/ACT inhaler Inhale 2 puffs into the lungs every 6 hours as needed for Wheezing 12/21/20   Luca Bashir MD   naproxen (EC NAPROSYN) 500 MG EC tablet Take 500 mg by mouth 2 times daily (with meals)    Historical Provider, MD       Future Appointments   Date Time Provider Whitney Andre   5/27/2021  2:30 PM MD MINDY [facility] - [facility]   8/26/2021  4:00 PM Linda Faust MD  GYN [facility]

## 2021-05-27 NOTE — PROGRESS NOTES
Gaby Patten (:  1990) is a 31 y.o. female,Established patient, here for evaluation of the following chief complaint(s):  Diabetes      ASSESSMENT/PLAN:  1. Uncontrolled type 2 diabetes mellitus with hyperglycemia [facility])  Assessment & Plan:   Improving! Continue current meds and continue working on lifestyle changes. Knows needs eye exam.     Orders:  -     POCT glycosylated hemoglobin (Hb A1C)  2. Moderate persistent asthma without complication  Assessment & Plan:  Agree she might benefit from allergy shots. Will refer to allergy. Orders:  -     External Referral To Allergy  3. Seasonal allergic rhinitis, unspecified trigger  Assessment & Plan:  Continue OTC meds. Orders:  -     External Referral To Allergy  4. Psychophysiologic insomnia  Comments:  Stable on prn amitriptyline. 5. Tobacco dependence  Assessment & Plan:  She is almost quit at this point. 1-2 cigarettes per week. Return in about 3 months (around 2021) for chronic conditions + POC A1C. SUBJECTIVE/OBJECTIVE:  HPI    Trying to exercise more. She is managing to take the Metformin at bedtime. If she takes it with food it gives her upset stomach. However she takes it on an empty stomach at bedtime she does okay. She is very reluctant to increase the dose beyond 500 mg. She is also taking the Januvia. She says it was covered by her insurance. She saw Kranthi Mckenna and is making some lifestyle changes including drinking less alcohol, trying to drink less juice and exercising more. Juice and other sugary drinks are her downfall. Sleep is better. She is not needing the amitriptyline every night. She would like to see an allergist for her asthma and allergies. She had IgE testing in  which showed multiple environmental allergens. She is taking OTC nondrowsy antihistamines. She does have a dog    Review of Systems   Constitutional: Negative for chills, fatigue and fever. Respiratory: Positive for wheezing. Negative for cough and shortness of breath. Cardiovascular: Negative for chest pain, palpitations and leg swelling. Gastrointestinal: Negative for abdominal pain, diarrhea, nausea and vomiting. Endocrine: Negative for polydipsia and polyuria. Psychiatric/Behavioral: Negative for dysphoric mood. Current Outpatient Medications on File Prior to Visit   Medication Sig Dispense Refill    Loratadine (CLARITIN PO) Take by mouth      NIFEdipine (ADALAT CC) 30 MG extended release tablet TAKE 1 TABLET BY MOUTH EVERY DAY 90 tablet 1    metFORMIN (GLUCOPHAGE-XR) 500 MG extended release tablet TAKE 1 TABLET BY MOUTH EVERY DAY AT NIGHT 90 tablet 1    Cholecalciferol 50 MCG (2000 UT) TABS Take 1 tablet by mouth daily 30 tablet 11    OneTouch Delica Lancets 23N MISC 1 each by Does not apply route daily 100 each 11    Lancet Devices (ONE TOUCH DELICA LANCING DEV) MISC 1 Device by Does not apply route daily 1 each 0    Blood Glucose Monitoring Suppl (ONE TOUCH ULTRA 2) w/Device KIT 1 kit by Does not apply route daily 1 kit 0    blood glucose test strips (ONETOUCH ULTRA) strip 1 each by In Vitro route daily 100 each 11    amitriptyline (ELAVIL) 25 MG tablet Take 1 tablet by mouth nightly 30 tablet 5    SITagliptin (JANUVIA) 100 MG tablet Take 1 tablet by mouth daily 30 tablet 5    albuterol sulfate HFA (PROAIR HFA) 108 (90 Base) MCG/ACT inhaler Inhale 2 puffs into the lungs every 6 hours as needed for Wheezing 1 Inhaler 11    naproxen (EC NAPROSYN) 500 MG EC tablet Take 500 mg by mouth 2 times daily (with meals)      [DISCONTINUED] vitamin D (ERGOCALCIFEROL) 1.25 MG (47043 UT) CAPS capsule TAKE 1 CAPSULE BY MOUTH ONE TIME PER WEEK 4 capsule 1     No current facility-administered medications on file prior to visit. Vitals:    05/27/21 1433   BP: 132/80   Pulse: 92   Resp: 14   SpO2: 98%     Physical Exam  Constitutional:       General: She is not in acute distress. Appearance: Normal appearance. She is not diaphoretic. HENT:      Head: Normocephalic and atraumatic. Right Ear: External ear normal.      Left Ear: External ear normal.      Nose: Nose normal.      Mouth/Throat:      Mouth: Mucous membranes are moist.      Pharynx: Oropharynx is clear. Eyes:      General: No scleral icterus. Conjunctiva/sclera: Conjunctivae normal.   Cardiovascular:      Rate and Rhythm: Normal rate and regular rhythm. Pulses: Normal pulses. Heart sounds: Normal heart sounds. No murmur heard. No friction rub. No gallop. Pulmonary:      Effort: Pulmonary effort is normal.      Breath sounds: Normal breath sounds. No wheezing, rhonchi or rales. Abdominal:      General: Abdomen is flat. Bowel sounds are normal.   Musculoskeletal:         General: No deformity. Cervical back: Normal range of motion. Right lower leg: No edema. Left lower leg: No edema. Skin:     General: Skin is warm and dry. Findings: No rash. Neurological:      General: No focal deficit present. Mental Status: She is alert. Coordination: Coordination normal.      Gait: Gait normal.   Psychiatric:         Mood and Affect: Mood normal.         Behavior: Behavior normal.           On this date 5/27/2021 I have spent 35 minutes reviewing previous notes, test results and face to face with the patient discussing the diagnosis and importance of compliance with the treatment plan as well as documenting on the day of the visit. An electronic signature was used to authenticate this note.     --Anne Olivas MD

## 2021-06-17 DIAGNOSIS — F51.04 PSYCHOPHYSIOLOGIC INSOMNIA: ICD-10-CM

## 2021-06-17 RX ORDER — AMITRIPTYLINE HYDROCHLORIDE 25 MG/1
TABLET, FILM COATED ORAL
Qty: 90 TABLET | Refills: 1 | Status: SHIPPED | OUTPATIENT
Start: 2021-06-17 | End: 2021-12-14

## 2021-06-21 ENCOUNTER — CARE COORDINATION (OUTPATIENT)
Dept: CARE COORDINATION | Age: 31
End: 2021-06-21

## 2021-06-21 ENCOUNTER — CARE COORDINATION (OUTPATIENT)
Dept: INTERNAL MEDICINE CLINIC | Age: 31
End: 2021-06-21

## 2021-07-01 ENCOUNTER — CARE COORDINATION (OUTPATIENT)
Dept: CARE COORDINATION | Age: 31
End: 2021-07-01

## 2021-07-01 NOTE — CARE COORDINATION
Ambulatory Care Coordination Note  7/1/2021  CM Risk Score: 10  Charlson 10 Year Mortality Risk Score: 10%     ACC: Rafael Wakefield RN    Summary Note:    Call from [address], discussed concerns  She is CM for member but has not been able to reach pt at this time  Phone number [phone]  Transportation- [phone]---15 round trips/ year, 72 hours advanced notice  Consider [facility] scholarship program- Johanny Flores [phone]      Call to patient. She was driving so could not talk long  Discussed above and per pt preference text sent w above info and numbers to call,      Plan  Pt to call local [facility] re membership and scholarship info  Cont outreach  Transportation available as needed if car out of commision or mom unavailable to drive- 72 hour notice            Care Coordination Interventions    Program Enrollment: Complex Care  Referral from Primary Care Provider: No  Suggested Interventions and Community Resources  Diabetes Education: Completed (Comment: 3/1 Stefanie Hopson)  Registered Dietician: Completed (Comment: 3/1 Stefanie Hopson)  Transportation Support: Completed  Other Services or Interventions: 6/21/21- ref to [address] [place] [address] re transportation         Goals Addressed    None         Prior to Admission medications    Medication Sig Start Date End Date Taking?  Authorizing Provider   amitriptyline (ELAVIL) 25 MG tablet TAKE 1 TABLET BY MOUTH EVERY DAY AT NIGHT 6/17/21   Amada Matthews MD   Loratadine (CLARITIN PO) Take by mouth    Historical Provider, MD   NIFEdipine (ADALAT CC) 30 MG extended release tablet TAKE 1 TABLET BY MOUTH EVERY DAY 3/16/21   Amada Matthews MD   metFORMIN (GLUCOPHAGE-XR) 500 MG extended release tablet TAKE 1 TABLET BY MOUTH EVERY DAY AT NIGHT 3/15/21   Amada Matthews MD   Cholecalciferol 50 MCG (2000 UT) TABS Take 1 tablet by mouth daily 3/11/21   Amada Matthews MD   OneTouch Delica Lancets 39S MISC 1 each by Does not apply route daily 3/2/21   Amada Matthews MD Lancet Devices (ONE TOUCH DELICA LANCING DEV) MISC 1 Device by Does not apply route daily 3/2/21   Amol Viera MD   Blood Glucose Monitoring Suppl (ONE TOUCH ULTRA 2) w/Device KIT 1 kit by Does not apply route daily 3/2/21   Amol Viera MD   blood glucose test strips (ONETOUCH ULTRA) strip 1 each by In Vitro route daily 3/2/21   Amol Viera MD   SITagliptin (JANUVIA) 100 MG tablet Take 1 tablet by mouth daily 2/25/21   Amol Viera MD   vitamin D (ERGOCALCIFEROL) 1.25 MG (54484 UT) CAPS capsule TAKE 1 CAPSULE BY MOUTH ONE TIME PER WEEK 2/8/21 3/11/21  Amol Viera MD   albuterol sulfate HFA (PROAIR HFA) 108 (90 Base) MCG/ACT inhaler Inhale 2 puffs into the lungs every 6 hours as needed for Wheezing 12/21/20   Amol Viera MD   naproxen (EC NAPROSYN) 500 MG EC tablet Take 500 mg by mouth 2 times daily (with meals)    Historical Provider, MD       Future Appointments   Date Time Provider Whitney Andre   8/26/2021  3:00 PM MD MINDY Leonardo Cinci - DYD   8/26/2021  4:00 PM Nino Thomas MD [facility] GYN MMA     Diabetes Assessment    Medic Alert ID: No (Comment: 7/1/21- [employer] scholarship program-- will call local [facility])  Meal Planning: Plate Method   How often do you test your blood sugar?: Daily (Comment: 7/1/21 120 this AM)   Do you have barriers with adherence to non-pharmacologic self-management interventions?  (Nutrition/Exercise/Self-Monitoring): Yes   Have you ever had to go to the ED for symptoms of low blood sugar?: No       No patient-reported symptoms

## 2021-07-22 ENCOUNTER — HOSPITAL ENCOUNTER (EMERGENCY)
Age: 31
Discharge: HOME | End: 2021-07-22
Attending: EMERGENCY MEDICINE
Payer: COMMERCIAL

## 2021-07-22 VITALS
DIASTOLIC BLOOD PRESSURE: 85 MMHG | OXYGEN SATURATION: 99 % | HEIGHT: 65 IN | WEIGHT: 267.1 LBS | SYSTOLIC BLOOD PRESSURE: 141 MMHG | RESPIRATION RATE: 16 BRPM | BODY MASS INDEX: 44.5 KG/M2 | HEART RATE: 99 BPM | TEMPERATURE: 98.4 F

## 2021-07-22 DIAGNOSIS — B96.89 BACTERIAL VAGINITIS: Primary | ICD-10-CM

## 2021-07-22 DIAGNOSIS — N76.0 BACTERIAL VAGINITIS: Primary | ICD-10-CM

## 2021-07-22 LAB
BACTERIA WET PREP: ABNORMAL
BACTERIA: ABNORMAL /HPF
BILIRUBIN URINE: NEGATIVE
BLOOD, URINE: ABNORMAL
CLARITY: ABNORMAL
CLUE CELLS: ABNORMAL
COLOR: YELLOW
EPITHELIAL CELLS WET PREP: ABNORMAL
EPITHELIAL CELLS, UA: ABNORMAL /HPF (ref 0–5)
GLUCOSE URINE: NEGATIVE MG/DL
HCG(URINE) PREGNANCY TEST: NEGATIVE
KETONES, URINE: NEGATIVE MG/DL
LEUKOCYTE ESTERASE, URINE: ABNORMAL
MICROSCOPIC EXAMINATION: YES
NITRITE, URINE: NEGATIVE
PH UA: 5.5 (ref 5–8)
PROTEIN UA: NEGATIVE MG/DL
RBC UA: ABNORMAL /HPF (ref 0–4)
RBC WET PREP: ABNORMAL
SOURCE WET PREP: ABNORMAL
SPECIFIC GRAVITY UA: 1.02 (ref 1–1.03)
TRICHOMONAS PREP: ABNORMAL
URINE REFLEX TO CULTURE: YES
URINE TYPE: ABNORMAL
UROBILINOGEN, URINE: 0.2 E.U./DL
WBC UA: ABNORMAL /HPF (ref 0–5)
WBC WET PREP: ABNORMAL
YEAST WET PREP: ABNORMAL

## 2021-07-22 PROCEDURE — 87210 SMEAR WET MOUNT SALINE/INK: CPT

## 2021-07-22 PROCEDURE — 81001 URINALYSIS AUTO W/SCOPE: CPT

## 2021-07-22 PROCEDURE — 87491 CHLMYD TRACH DNA AMP PROBE: CPT

## 2021-07-22 PROCEDURE — 99282 EMERGENCY DEPT VISIT SF MDM: CPT

## 2021-07-22 PROCEDURE — 87086 URINE CULTURE/COLONY COUNT: CPT

## 2021-07-22 PROCEDURE — 84703 CHORIONIC GONADOTROPIN ASSAY: CPT

## 2021-07-22 PROCEDURE — 87591 N.GONORRHOEAE DNA AMP PROB: CPT

## 2021-07-22 RX ORDER — METRONIDAZOLE 500 MG/1
500 TABLET ORAL 2 TIMES DAILY
Qty: 14 TABLET | Refills: 0 | Status: SHIPPED | OUTPATIENT
Start: 2021-07-22 | End: 2021-07-29

## 2021-07-22 ASSESSMENT — ENCOUNTER SYMPTOMS
NAUSEA: 0
VOMITING: 0

## 2021-07-22 ASSESSMENT — PAIN DESCRIPTION - DESCRIPTORS: DESCRIPTORS: SHOOTING;SHARP

## 2021-07-22 ASSESSMENT — PAIN DESCRIPTION - PAIN TYPE: TYPE: ACUTE PAIN

## 2021-07-22 ASSESSMENT — PAIN DESCRIPTION - LOCATION: LOCATION: ABDOMEN

## 2021-07-22 ASSESSMENT — PAIN SCALES - GENERAL: PAINLEVEL_OUTOF10: 5

## 2021-07-22 ASSESSMENT — PAIN DESCRIPTION - FREQUENCY: FREQUENCY: INTERMITTENT

## 2021-07-23 ENCOUNTER — CARE COORDINATION (OUTPATIENT)
Dept: CARE COORDINATION | Age: 31
End: 2021-07-23

## 2021-07-23 NOTE — CARE COORDINATION
Ambulatory Care Coordination  ED Follow up Call    Reason for ED visit:  Abdominal Pain and Vaginal discharge   Status:     not changed    Did you call your PCP prior to going to the ED? No      Did you receive a discharge instructions from the Emergency Room? Yes  Review of Instructions:     Understands what to report/when to return?:  Yes, discussed with patient follow up with PCP   Understands discharge instructions?:  Yes   Following discharge instructions?:  Yes   If not why? Are there any new complaints of pain? No  New Pain Meds? No    Constipation prophylaxis needed? No    If you have a wound is the dressing clean, dry, and intact? N/A  Understands wound care regimen? N/A    Are there any other complaints/concerns that you wish to tell your provider? FU appts/Provider:    Future Appointments   Date Time Provider Whitney Andre   8/26/2021  3:00 PM MD MINDY Munroe - DYMINDA   8/26/2021  4:00 PM Wen Gallo MD [facility] GYN MMA     New Medications?:   Yes, Flagyl      Medication Reconciliation by phone - Yes  Understands Medications? Yes  Taking Medications? Yes  Can you swallow your pills? Yes    Any further needs in the home i.e. Equipment? No    Link to services in community?:  No   Which services: Transportation through insurance if needed.

## 2021-07-23 NOTE — ED PROVIDER NOTES
[address] [address] ENCOUNTER      Pt Name: Marshall Baxter  MRN: [id number]  [place] 1990  Date of evaluation: 7/22/2021  Provider: Jarett Lester MD    [address] [place] [address]       Chief Complaint   Patient presents with    Abdominal Pain    Vaginal Discharge         HISTORY OF PRESENT ILLNESS   (Location/Symptom, Timing/Onset,Context/Setting, Quality, Duration, Modifying Factors, Severity)  Note limiting factors. Marshall Baxter is a 31 y.o. female who presents to the emergency department for abdominal pain intermittent x 4 days. She also reports that she is having vaginal odor. LMP 7/1/2021. Nursing notes were reviewed. REVIEW OF SYSTEMS    (2-9 systems for level 4, 10 or more for level 5)     Review of Systems   Constitutional: Negative for fever. Gastrointestinal: Negative for nausea and vomiting. Genitourinary: Positive for vaginal discharge. Negative for dysuria. Yellow in color, odor x 2 days. Trichomonas some months ago. Sexually active. No protection.           PAST MEDICAL HISTORY     Past Medical History:   Diagnosis Date    Asthma     Chronic post-traumatic headache, not intractable 3/3/2021    Diabetes mellitus ([facility] [address]     Dysmenorrhea     Hyperlipidemia     Lactose intolerance     Menstrual irregularity     Obesity     Tobacco dependence          SURGICALHISTORY       Past Surgical History:   Procedure Laterality Date    FRACTURE SURGERY      HIP SURGERY Bilateral     for dislocation - ? 2006/2007         CURRENT MEDICATIONS       Discharge Medication List as of 7/22/2021 10:41 PM      CONTINUE these medications which have NOT CHANGED    Details   amitriptyline (ELAVIL) 25 MG tablet TAKE 1 TABLET BY MOUTH EVERY DAY AT NIGHT, Disp-90 tablet, R-1Normal      Loratadine (CLARITIN PO) Take by mouthHistorical Med      NIFEdipine (ADALAT CC) 30 MG extended release tablet TAKE 1 TABLET BY MOUTH EVERY DAY, Disp-90 tablet, R-1Normal Determinants of Health     Financial Resource Strain:     Difficulty of Paying Living Expenses:    Food Insecurity:     Worried About Running Out of Food in the Last Year:     [address] in the Last Year:    Transportation Needs:     Lack of Transportation (Medical):  Lack of Transportation (Non-Medical):    Physical Activity:     Days of Exercise per Week:     Minutes of Exercise per Session:    Stress:     Feeling of Stress :    Social Connections:     Frequency of Communication with Friends and Family:     Frequency of Social Gatherings with Friends and Family:     Attends [employer] Services:     Active Member of Clubs or Organizations:     Attends Club or Organization Meetings:     Marital Status:    Intimate Partner Violence:     Fear of Current or Ex-Partner:     Emotionally Abused:     Physically Abused:     Sexually Abused:        SCREENINGS             PHYSICAL EXAM    (up to 7 for level 4, 8 or more for level 5)     ED Triage Vitals [07/22/21 2027]   BP Temp Temp Source Pulse Resp SpO2 Height Weight   (!) 141/85 98.4 °F (36.9 °C) Oral 99 16 99 % 5' 5\" (1.651 m) 267 lb 1.6 oz (121.2 kg)       Physical Exam  Vitals and nursing note reviewed. Constitutional:       Appearance: Normal appearance. She is well-developed. She is not ill-appearing. HENT:      Head: Normocephalic and atraumatic. Right Ear: External ear normal.      Left Ear: External ear normal.      Nose: Nose normal.   Eyes:      General: No scleral icterus. Right eye: No discharge. Left eye: No discharge. Conjunctiva/sclera: Conjunctivae normal.   Cardiovascular:      Rate and Rhythm: Normal rate and regular rhythm. Heart sounds: Normal heart sounds. Pulmonary:      Effort: Pulmonary effort is normal. No respiratory distress. Breath sounds: Normal breath sounds. No wheezing or rales. Abdominal:      General: Bowel sounds are normal. There is no distension.       Palpations: Abdomen is soft. Tenderness: There is no abdominal tenderness. There is no guarding or rebound. Musculoskeletal:      Cervical back: Neck supple. Skin:     Coloration: Skin is not pale. Neurological:      Mental Status: She is alert.    Psychiatric:         Mood and Affect: Mood normal.         Behavior: Behavior normal.         *  DIAGNOSTIC RESULTS     EKG: All EKG's are interpreted by the Emergency Department Physician who either signs or Co-signs this chart in the absence of a cardiologist.    12 lead EKG shows     RADIOLOGY:   Non-plain film images such as CT, Ultrasound and MRI are read by the radiologist. Plain radiographic images are visualized and preliminarily interpreted by the emergency physician with the below findings:        Interpretation per the Radiologist below, if available at the time of this note:    No orders to display         ED BEDSIDE ULTRASOUND:   Performed by ED Physician - none    LABS:  Labs Reviewed   WET PREP, GENITAL - Abnormal; Notable for the following components:       Result Value    Clue Cells, Wet Prep 1+ (*)     All other components within normal limits    Narrative:     Performed at:  [facility]  [employer],  [place] [place]   Phone [phone]   URINE RT REFLEX TO CULTURE - Abnormal; Notable for the following components:    Clarity, UA CLOUDY (*)     Blood, Urine TRACE-INTACT (*)     Leukocyte Esterase, Urine SMALL (*)     All other components within normal limits    Narrative:     Performed at:  [facility]  [employer],  [employer] [place]   Phone [phone]   MICROSCOPIC URINALYSIS - Abnormal; Notable for the following components:    WBC, UA 21-50 (*)     Epithelial Cells, UA 11-20 (*)     Bacteria, UA 4+ (*)     All other components within normal limits    Narrative:     Performed at:  [facility] [address], [place], [address]   Phone [phone]   C. TRACHOMATIS N.GONORRHOEAE DNA   CULTURE, URINE   PREGNANCY, URINE    Narrative:     Performed at:  [facility]  [address]   Phone [phone]       All other labs were within normal range or not returned as of this dictation. EMERGENCY DEPARTMENT COURSE and DIFFERENTIAL DIAGNOSIS/MDM:   Vitals:    Vitals:    07/22/21 2027   BP: (!) 141/85   Pulse: 99   Resp: 16   Temp: 98.4 °F (36.9 °C)   TempSrc: Oral   SpO2: 99%   Weight: 267 lb 1.6 oz (121.2 kg)   Height: 5' 5\" (1.651 m)       Adult female with vaginal symptoms and intermittent abdominal pain. She does not have any abdominal tenderness presently therefore the patient is allowed to obtain vaginal specimen herself. Wet mount is positive for clue cells. She will be treated for bacterial vaginitis. Follow-up in the primary care setting is recommended. CRITICAL CARE TIME   None       CONSULTS:  None    PROCEDURES:       Procedures    FINAL IMPRESSION      1.  Bacterial vaginitis          DISPOSITION/PLAN   DISPOSITION Decision To Discharge 07/22/2021 10:34:14 PM      PATIENT REFERREDTO:  Amada Matthews MD  [address] [place] [address]  [phone]    Schedule an appointment as soon as possible for a visit         DISCHARGEMEDICATIONS:  Discharge Medication List as of 7/22/2021 10:41 PM      START taking these medications    Details   metroNIDAZOLE (FLAGYL) 500 MG tablet Take 1 tablet by mouth 2 times daily for 7 days, Disp-14 tablet, R-0Normal                (Please note that portions of this note were completed with a voice recognition program.  Efforts were made to edit the dictations but occasionally words are mis-transcribed.)    Addie Plaza MD (electronically signed)  Attending Emergency Physician          Addie Plaza MD  07/23/21 9940

## 2021-07-23 NOTE — ED NOTES
Patient prepared for and ready to be discharged. Patient discharged at this time in no acute distress after verbalizing understanding of discharge instructions. Patient left after receiving After Visit Summary instructions.         Ann Hamilton RN  07/22/21 4835

## 2021-07-24 LAB — URINE CULTURE, ROUTINE: NORMAL

## 2021-07-26 LAB
C TRACH DNA GENITAL QL NAA+PROBE: NEGATIVE
N. GONORRHOEAE DNA: NEGATIVE

## 2021-08-24 ENCOUNTER — CARE COORDINATION (OUTPATIENT)
Dept: CARE COORDINATION | Age: 31
End: 2021-08-24

## 2021-08-26 ENCOUNTER — OFFICE VISIT (OUTPATIENT)
Dept: GYNECOLOGY | Age: 31
End: 2021-08-26
Payer: COMMERCIAL

## 2021-08-26 ENCOUNTER — OFFICE VISIT (OUTPATIENT)
Dept: INTERNAL MEDICINE CLINIC | Age: 31
End: 2021-08-26
Payer: COMMERCIAL

## 2021-08-26 VITALS
HEIGHT: 65 IN | WEIGHT: 263 LBS | SYSTOLIC BLOOD PRESSURE: 122 MMHG | HEART RATE: 104 BPM | RESPIRATION RATE: 17 BRPM | DIASTOLIC BLOOD PRESSURE: 78 MMHG | OXYGEN SATURATION: 98 % | BODY MASS INDEX: 43.82 KG/M2

## 2021-08-26 VITALS
RESPIRATION RATE: 14 BRPM | HEART RATE: 104 BPM | DIASTOLIC BLOOD PRESSURE: 78 MMHG | OXYGEN SATURATION: 98 % | BODY MASS INDEX: 43.77 KG/M2 | WEIGHT: 263 LBS | SYSTOLIC BLOOD PRESSURE: 122 MMHG

## 2021-08-26 DIAGNOSIS — E11.65 UNCONTROLLED TYPE 2 DIABETES MELLITUS WITH HYPERGLYCEMIA (HCC): Primary | ICD-10-CM

## 2021-08-26 DIAGNOSIS — Z01.419 WELL WOMAN EXAM WITH ROUTINE GYNECOLOGICAL EXAM: ICD-10-CM

## 2021-08-26 DIAGNOSIS — N91.1 SECONDARY AMENORRHEA: ICD-10-CM

## 2021-08-26 DIAGNOSIS — N94.6 DYSMENORRHEA: ICD-10-CM

## 2021-08-26 DIAGNOSIS — R10.2 PELVIC PAIN: Primary | ICD-10-CM

## 2021-08-26 DIAGNOSIS — I10 ESSENTIAL HYPERTENSION: ICD-10-CM

## 2021-08-26 LAB
CONTROL: NORMAL
HBA1C MFR BLD: 8.2 %
PREGNANCY TEST URINE, POC: NEGATIVE

## 2021-08-26 PROCEDURE — G8427 DOCREV CUR MEDS BY ELIG CLIN: HCPCS | Performed by: INTERNAL MEDICINE

## 2021-08-26 PROCEDURE — 3052F HG A1C>EQUAL 8.0%<EQUAL 9.0%: CPT | Performed by: INTERNAL MEDICINE

## 2021-08-26 PROCEDURE — 2022F DILAT RTA XM EVC RTNOPTHY: CPT | Performed by: INTERNAL MEDICINE

## 2021-08-26 PROCEDURE — G8417 CALC BMI ABV UP PARAM F/U: HCPCS | Performed by: INTERNAL MEDICINE

## 2021-08-26 PROCEDURE — 4004F PT TOBACCO SCREEN RCVD TLK: CPT | Performed by: INTERNAL MEDICINE

## 2021-08-26 PROCEDURE — 81025 URINE PREGNANCY TEST: CPT | Performed by: INTERNAL MEDICINE

## 2021-08-26 PROCEDURE — 83036 HEMOGLOBIN GLYCOSYLATED A1C: CPT | Performed by: INTERNAL MEDICINE

## 2021-08-26 PROCEDURE — 99214 OFFICE O/P EST MOD 30 MIN: CPT | Performed by: INTERNAL MEDICINE

## 2021-08-26 PROCEDURE — 99395 PREV VISIT EST AGE 18-39: CPT | Performed by: OBSTETRICS & GYNECOLOGY

## 2021-08-26 ASSESSMENT — ENCOUNTER SYMPTOMS
WHEEZING: 0
COUGH: 0
SHORTNESS OF BREATH: 0

## 2021-08-26 NOTE — ASSESSMENT & PLAN NOTE
Seeing Dr. Nagy Hopxiomara today. Discussed that she may need a pelvic u/s. In meantime would use NSAIDs for this crampy type of pelvic pain.

## 2021-08-26 NOTE — PROGRESS NOTES
Cesar Carter (:  1990) is a 31 y.o. female,Established patient, here for evaluation of the following chief complaint(s):  Diabetes (HA for the past two 2 weeks and sugars have been running high around 226 ) and Abdominal Pain (lower abdominal pain, comes and goes, no N/V, diarrhea )      ASSESSMENT/PLAN:  1. Uncontrolled type 2 diabetes mellitus with hyperglycemia [facility])  Assessment & Plan:   Improving! Continue current meds and continue working on lifestyle changes. Knows needs eye exam.   Orders:  -     POCT glycosylated hemoglobin (Hb A1C)  2. Secondary amenorrhea  Comments:  UPT today  Orders:  -     POCT urine pregnancy  3. Dysmenorrhea  Assessment & Plan:  Seeing Dr. Therese Perkins today. Discussed that she may need a pelvic u/s. In meantime would use NSAIDs for this crampy type of pelvic pain. 4. Essential hypertension  Assessment & Plan:   Well-controlled, continue current medications      Return in about 3 months (around 2021) for DM2 + POC A1C. SUBJECTIVE/OBJECTIVE:  HPI    Overall feeling well  Has had more headaches again lately. Trying to drink water. Rarely takes anything for them. Had an episoede of BV recently. Taking DM meds as rx'ed. Review of Systems   Constitutional: Negative for chills, fatigue and fever. Respiratory: Negative for cough, shortness of breath and wheezing. Cardiovascular: Negative for chest pain, palpitations and leg swelling. Genitourinary: Positive for menstrual problem and pelvic pain. Neurological: Positive for headaches.        Current Outpatient Medications on File Prior to Visit   Medication Sig Dispense Refill    amitriptyline (ELAVIL) 25 MG tablet TAKE 1 TABLET BY MOUTH EVERY DAY AT NIGHT 90 tablet 1    Loratadine (CLARITIN PO) Take by mouth      NIFEdipine (ADALAT CC) 30 MG extended release tablet TAKE 1 TABLET BY MOUTH EVERY DAY 90 tablet 1    metFORMIN (GLUCOPHAGE-XR) 500 MG extended release tablet TAKE 1 TABLET BY MOUTH EVERY DAY AT NIGHT 90 tablet 1    Cholecalciferol 50 MCG (2000 UT) TABS Take 1 tablet by mouth daily 30 tablet 11    OneTouch Delica Lancets 97E MISC 1 each by Does not apply route daily 100 each 11    Lancet Devices (ONE TOUCH DELICA LANCING DEV) MISC 1 Device by Does not apply route daily 1 each 0    Blood Glucose Monitoring Suppl (ONE TOUCH ULTRA 2) w/Device KIT 1 kit by Does not apply route daily 1 kit 0    blood glucose test strips (ONETOUCH ULTRA) strip 1 each by In Vitro route daily 100 each 11    SITagliptin (JANUVIA) 100 MG tablet Take 1 tablet by mouth daily 30 tablet 5    albuterol sulfate HFA (PROAIR HFA) 108 (90 Base) MCG/ACT inhaler Inhale 2 puffs into the lungs every 6 hours as needed for Wheezing 1 Inhaler 11    [DISCONTINUED] vitamin D (ERGOCALCIFEROL) 1.25 MG (96152 UT) CAPS capsule TAKE 1 CAPSULE BY MOUTH ONE TIME PER WEEK 4 capsule 1     No current facility-administered medications on file prior to visit. Vitals:    08/26/21 1459   BP: 122/78   Pulse: 104   Resp: 14   SpO2: 98%     Physical Exam  Constitutional:       General: She is not in acute distress. Appearance: Normal appearance. She is not diaphoretic. HENT:      Head: Normocephalic and atraumatic. Right Ear: External ear normal.      Left Ear: External ear normal.      Nose: Nose normal.      Mouth/Throat:      Mouth: Mucous membranes are moist.      Pharynx: Oropharynx is clear. Eyes:      General: No scleral icterus. Conjunctiva/sclera: Conjunctivae normal.   Cardiovascular:      Rate and Rhythm: Normal rate and regular rhythm. Pulses: Normal pulses. Heart sounds: Normal heart sounds. No murmur heard. No friction rub. No gallop. Pulmonary:      Effort: Pulmonary effort is normal.      Breath sounds: Normal breath sounds. No wheezing, rhonchi or rales. Abdominal:      General: Abdomen is flat. Bowel sounds are normal.   Musculoskeletal:         General: No deformity.       Cervical back: Normal range of motion. Right lower leg: No edema. Left lower leg: No edema. Skin:     General: Skin is warm and dry. Findings: No rash. Neurological:      General: No focal deficit present. Mental Status: She is alert. Coordination: Coordination normal.      Gait: Gait normal.   Psychiatric:         Mood and Affect: Mood normal.         Behavior: Behavior normal.           On this date 8/26/2021 I have spent 35 minutes reviewing previous notes, test results and face to face with the patient discussing the diagnosis and importance of compliance with the treatment plan as well as documenting on the day of the visit. An electronic signature was used to authenticate this note.     --Jessica Burroughs MD

## 2021-08-26 NOTE — LETTER
[facility]  [address] [address]  Phone: [phone]  Fax: [phone]    Randy Bishop MD        August 26, 2021    Rae Padilla  [address] [address]      Dear Bertrand Zamorano:    Please be advised to whom it may concern regarding Rae Padilla, she was seen in our office today. If you have any questions or concerns, please don't hesitate to call.     Sincerely,        Randy Bishop MD

## 2021-08-26 NOTE — LETTER
[facility]  [address] [place] [address]  Phone: [phone]  Fax: [phone]    Angelo Mallory MD        August 26, 2021     Patient: Idalmis Valdez   YOB: 1990   Date of Visit: 8/26/2021       To Whom It May Concern:    Idalmis Valdez was seen at my office today for a medical appointment. If you have any questions or concerns, please don't hesitate to call.     Sincerely,        Angelo Mallory MD

## 2021-08-27 LAB — URINE CULTURE, ROUTINE: NORMAL

## 2021-08-29 ASSESSMENT — ENCOUNTER SYMPTOMS
GASTROINTESTINAL NEGATIVE: 1
RESPIRATORY NEGATIVE: 1
EYES NEGATIVE: 1

## 2021-08-29 NOTE — PROGRESS NOTES
[address] [facility] [address]  Dept: [phone]  Dept Fax: [phone]  Loc: [phone]     2021    Lotus Garcia (:  1990) is a 31 y.o. female, here for evaluation of the following medical concerns:    Patient for annual. Patient is here with pelvic cramping. Patient with pain on sides. Still needs to do US. Gynecologic Exam    Pelvic Pain        Review of Systems   Constitutional: Negative. HENT: Negative. Eyes: Negative. Respiratory: Negative. Cardiovascular: Negative. Gastrointestinal: Negative. Genitourinary: Positive for menstrual problem and pelvic pain. Musculoskeletal: Negative. Skin: Negative. Neurological: Negative. Psychiatric/Behavioral: Negative.       Date of Birth 1990  Past Medical History:   Diagnosis Date    Asthma     Chronic post-traumatic headache, not intractable 3/3/2021    Diabetes mellitus ([facility] [address]     Dysmenorrhea     Essential hypertension 2021    Hyperlipidemia     Lactose intolerance     Menstrual irregularity     Obesity     Tobacco dependence      Past Surgical History:   Procedure Laterality Date    FRACTURE SURGERY      HIP SURGERY Bilateral     for dislocation - ?      OB History    Para Term  AB Living   0 0 0 0 0 0   SAB TAB Ectopic Molar Multiple Live Births   0 0 0 0 0 0     Social History     Socioeconomic History    Marital status: Single     Spouse name: Not on file    Number of children: Not on file    Years of education: Not on file    Highest education level: Not on file   Occupational History    Not on file   Tobacco Use    Smoking status: Current Every Day Smoker     Packs/day: 0.10     Types: Cigarettes     Last attempt to quit: 3/26/2018     Years since quitting: 3.4    Smokeless tobacco: Former User     Quit date: 2017   Vaping Use    Vaping Use: Never used   Substance and Sexual Activity    Alcohol use: Yes     Alcohol/week: 0.8 standard drinks     Types: 1 Standard drinks or equivalent per week     Comment: once a week    Drug use: No    Sexual activity: Yes     Partners: Male   Other Topics Concern    Not on file   Social History Narrative    Not on file     Social Determinants of Health     Financial Resource Strain:     Difficulty of Paying Living Expenses:    Food Insecurity:     Worried About Running Out of Food in the Last Year:     Ran Out of Food in the Last Year:    Transportation Needs:     Lack of Transportation (Medical):  Lack of Transportation (Non-Medical):    Physical Activity:     Days of Exercise per Week:     Minutes of Exercise per Session:    Stress:     Feeling of Stress :    Social Connections:     Frequency of Communication with Friends and Family:     Frequency of Social Gatherings with Friends and Family:     Attends Taoist Services:     Active Member of Clubs or Organizations:     Attends Club or Organization Meetings:     Marital Status:    Intimate Partner Violence:     Fear of Current or Ex-Partner:     Emotionally Abused:     Physically Abused:     Sexually Abused: Allergies   Allergen Reactions    Codeine Anaphylaxis, Swelling and Other (See Comments)     Throat swelling.      Lactose Nausea And Vomiting     Outpatient Medications Marked as Taking for the 8/26/21 encounter (Office Visit) with Aubrey Iraheta MD   Medication Sig Dispense Refill    amitriptyline (ELAVIL) 25 MG tablet TAKE 1 TABLET BY MOUTH EVERY DAY AT NIGHT 90 tablet 1    Loratadine (CLARITIN PO) Take by mouth      NIFEdipine (ADALAT CC) 30 MG extended release tablet TAKE 1 TABLET BY MOUTH EVERY DAY 90 tablet 1    metFORMIN (GLUCOPHAGE-XR) 500 MG extended release tablet TAKE 1 TABLET BY MOUTH EVERY DAY AT NIGHT 90 tablet 1    Cholecalciferol 50 MCG (2000 UT) TABS Take 1 tablet by mouth daily 30 tablet 11    OneTouch Delica Lancets 73J MISC 1 each by Does not apply route daily 100 each 11    Lancet Devices (ONE TOUCH DELICA LANCING DEV) MISC 1 Device by Does not apply route daily 1 each 0    Blood Glucose Monitoring Suppl (ONE TOUCH ULTRA 2) w/Device KIT 1 kit by Does not apply route daily 1 kit 0    blood glucose test strips (ONETOUCH ULTRA) strip 1 each by In Vitro route daily 100 each 11    SITagliptin (JANUVIA) 100 MG tablet Take 1 tablet by mouth daily 30 tablet 5    albuterol sulfate HFA (PROAIR HFA) 108 (90 Base) MCG/ACT inhaler Inhale 2 puffs into the lungs every 6 hours as needed for Wheezing 1 Inhaler 11     Family History   Problem Relation Age of Onset    Cancer Father     Hypertension Father     Hypertension Maternal Aunt      /78 (Site: Right Upper Arm, Position: Sitting, Cuff Size: Large Adult)   Pulse 104   Resp 17   Ht 5' 5\" (1.651 m)   Wt 263 lb (119.3 kg)   LMP 08/02/2021   SpO2 98%   BMI 43.77 kg/m²       Prior to Visit Medications    Medication Sig Taking?  Authorizing Provider   amitriptyline (ELAVIL) 25 MG tablet TAKE 1 TABLET BY MOUTH EVERY DAY AT NIGHT Yes Joanie Don MD   Loratadine (CLARITIN PO) Take by mouth Yes Historical Provider, MD   NIFEdipine (ADALAT CC) 30 MG extended release tablet TAKE 1 TABLET BY MOUTH EVERY DAY Yes Joanie Don MD   metFORMIN (GLUCOPHAGE-XR) 500 MG extended release tablet TAKE 1 TABLET BY MOUTH EVERY DAY AT NIGHT Yes Joanie Don MD   Cholecalciferol 50 MCG (2000 UT) TABS Take 1 tablet by mouth daily Yes Joanie Don MD   OneTouch Delica Lancets 30C MISC 1 each by Does not apply route daily Yes Joanie Don MD   Lancet Devices (ONE TOUCH DELICA LANCING DEV) MISC 1 Device by Does not apply route daily Yes Joanie Don MD   Blood Glucose Monitoring Suppl (ONE TOUCH ULTRA 2) w/Device KIT 1 kit by Does not apply route daily Yes Joanie Don MD   blood glucose test strips (ONETOUCH ULTRA) strip 1 each by In Vitro route daily Yes Joanie Don MD   SITagliptin (JANUVIA) 100 MG Last Year:     Ran Out of Food in the Last Year:    Transportation Needs:     Lack of Transportation (Medical):  Lack of Transportation (Non-Medical):    Physical Activity:     Days of Exercise per Week:     Minutes of Exercise per Session:    Stress:     Feeling of Stress :    Social Connections:     Frequency of Communication with Friends and Family:     Frequency of Social Gatherings with Friends and Family:     Attends Uatsdin Services:     Active Member of Clubs or Organizations:     Attends Club or Organization Meetings:     Marital Status:    Intimate Partner Violence:     Fear of Current or Ex-Partner:     Emotionally Abused:     Physically Abused:     Sexually Abused:         Family History   Problem Relation Age of Onset    Cancer Father     Hypertension Father     Hypertension Maternal Aunt        Vitals:    08/26/21 1552   BP: 122/78   Site: Right Upper Arm   Position: Sitting   Cuff Size: Large Adult   Pulse: 104   Resp: 17   SpO2: 98%   Weight: 263 lb (119.3 kg)   Height: 5' 5\" (1.651 m)     Estimated body mass index is 43.77 kg/m² as calculated from the following:    Height as of this encounter: 5' 5\" (1.651 m). Weight as of this encounter: 263 lb (119.3 kg). Physical Exam  Constitutional:       Appearance: Normal appearance. She is normal weight. HENT:      Head: Normocephalic. Nose: Nose normal.      Mouth/Throat:      Mouth: Mucous membranes are moist.      Pharynx: Oropharynx is clear. Eyes:      Extraocular Movements: Extraocular movements intact. Abdominal:      General: Abdomen is flat. There is no distension. Palpations: Abdomen is soft. There is no mass. Tenderness: There is no abdominal tenderness. There is no guarding or rebound. Hernia: No hernia is present. Musculoskeletal:         General: Normal range of motion. Skin:     General: Skin is warm and dry. Neurological:      General: No focal deficit present.       Mental Status: She is alert and oriented to person, place, and time. Mental status is at baseline. Psychiatric:         Mood and Affect: Mood normal.         Behavior: Behavior normal.         Thought Content: Thought content normal.         Judgment: Judgment normal.         ASSESSMENT/PLAN:      1. Well woman exam with routine gynecological exam  - PAP SMEAR    2.  Pelvic pain-pelvic US, UC

## 2021-09-01 NOTE — CARE COORDINATION
.  Ambulatory Care Coordination Note  9/1/2021  CM Risk Score: 10  Charlson 10 Year Mortality Risk Score: 10%     ACC: Linette Ghotra RN    Summary Note: Call to patient, WARD on VM   Pt had OV last week, A1c improving   Lab Results   Component Value Date    LABA1C 8.2 08/26/2021     Lab Results   Component Value Date    .2 12/05/2013     PLan   will continue outreach      Care Coordination Interventions    Program Enrollment: Complex Care  Referral from Primary Care Provider: No  Suggested Interventions and Community Resources  Diabetes Education: Completed (Comment: 3/1 Julian Hutchison)  Registered Dietician: Completed (Comment: 3/1 Julian Hutchison)  Other Services: Completed (Comment: 7/1 ELADIO Sanchesia Tammy [phone])  Transportation Support: Completed  Other Services or Interventions: 7/1- discussed [facility] [place] [address] [phone]         Goals Addressed    None         Prior to Admission medications    Medication Sig Start Date End Date Taking?  Authorizing Provider   amitriptyline (ELAVIL) 25 MG tablet TAKE 1 TABLET BY MOUTH EVERY DAY AT NIGHT 6/17/21   Argentina Bourgeois MD   Loratadine (CLARITIN PO) Take by mouth    Historical Provider, MD   NIFEdipine (ADALAT CC) 30 MG extended release tablet TAKE 1 TABLET BY MOUTH EVERY DAY 3/16/21   Argentina Bourgeois MD   metFORMIN (GLUCOPHAGE-XR) 500 MG extended release tablet TAKE 1 TABLET BY MOUTH EVERY DAY AT NIGHT 3/15/21   Argentina Bourgeois MD   Cholecalciferol 50 MCG (2000 UT) TABS Take 1 tablet by mouth daily 3/11/21   Argentina Bourgeois MD   OneTouch Delica Lancets 42M MISC 1 each by Does not apply route daily 3/2/21   Argentina Bourgeois MD   Lancet Devices (ONE TOUCH DELICA LANCING DEV) MISC 1 Device by Does not apply route daily 3/2/21   Argentina Bourgeois MD   Blood Glucose Monitoring Suppl (ONE TOUCH ULTRA 2) w/Device KIT 1 kit by Does not apply route daily 3/2/21   Argentina Bourgeois MD   blood glucose test strips (ONETOUCH ULTRA) strip 1 each by In Vitro route daily 3/2/21   Austen Wilson MD   SITagliptin (JANUVIA) 100 MG tablet Take 1 tablet by mouth daily 2/25/21   Austen Wilson MD   vitamin D (ERGOCALCIFEROL) 1.25 MG (52233 UT) CAPS capsule TAKE 1 CAPSULE BY MOUTH ONE TIME PER WEEK 2/8/21 3/11/21  Austen Wilson MD   albuterol sulfate HFA (PROAIR HFA) 108 (90 Base) MCG/ACT inhaler Inhale 2 puffs into the lungs every 6 hours as needed for Wheezing 12/21/20   Austen Wilson MD       Future Appointments   Date Time Provider Whitney Andre   12/2/2021  2:00 PM MD MINDY Smiley

## 2021-09-09 DIAGNOSIS — E11.65 UNCONTROLLED TYPE 2 DIABETES MELLITUS WITH HYPERGLYCEMIA (HCC): ICD-10-CM

## 2021-09-09 DIAGNOSIS — R03.0 BLOOD PRESSURE ELEVATED WITHOUT HISTORY OF HTN: ICD-10-CM

## 2021-09-09 DIAGNOSIS — G44.329 CHRONIC POST-TRAUMATIC HEADACHE, NOT INTRACTABLE: ICD-10-CM

## 2021-09-09 RX ORDER — NIFEDIPINE 30 MG/1
TABLET, FILM COATED, EXTENDED RELEASE ORAL
Qty: 90 TABLET | Refills: 1 | Status: SHIPPED | OUTPATIENT
Start: 2021-09-09 | End: 2022-01-18

## 2021-09-09 RX ORDER — METFORMIN HYDROCHLORIDE 500 MG/1
TABLET, EXTENDED RELEASE ORAL
Qty: 90 TABLET | Refills: 1 | Status: SHIPPED | OUTPATIENT
Start: 2021-09-09 | End: 2022-06-29 | Stop reason: SDUPTHER

## 2021-09-09 NOTE — TELEPHONE ENCOUNTER
Last appointment: 8/26/2021  Next appointment: 12/2/2021  Last refill:   Metformin 90 with 103/15/2021  Nifedipine 90 with 1 03/16/2021

## 2021-09-15 ENCOUNTER — HOSPITAL ENCOUNTER (OUTPATIENT)
Dept: ULTRASOUND IMAGING | Age: 31
Discharge: HOME | End: 2021-09-15
Payer: COMMERCIAL

## 2021-09-15 DIAGNOSIS — R10.2 PELVIC PAIN: ICD-10-CM

## 2021-09-15 PROCEDURE — 76830 TRANSVAGINAL US NON-OB: CPT

## 2021-09-15 PROCEDURE — 76856 US EXAM PELVIC COMPLETE: CPT

## 2021-09-22 DIAGNOSIS — E11.65 UNCONTROLLED TYPE 2 DIABETES MELLITUS WITH HYPERGLYCEMIA (HCC): ICD-10-CM

## 2021-09-23 RX ORDER — SITAGLIPTIN 100 MG/1
TABLET, FILM COATED ORAL
Qty: 30 TABLET | Refills: 5 | Status: SHIPPED | OUTPATIENT
Start: 2021-09-23 | End: 2022-03-03 | Stop reason: ALTCHOICE

## 2021-09-28 ENCOUNTER — TELEPHONE (OUTPATIENT)
Dept: INTERNAL MEDICINE CLINIC | Age: 31
End: 2021-09-28

## 2021-10-04 ENCOUNTER — OFFICE VISIT (OUTPATIENT)
Dept: INTERNAL MEDICINE CLINIC | Age: 31
End: 2021-10-04
Payer: COMMERCIAL

## 2021-10-04 VITALS
OXYGEN SATURATION: 99 % | RESPIRATION RATE: 14 BRPM | SYSTOLIC BLOOD PRESSURE: 132 MMHG | DIASTOLIC BLOOD PRESSURE: 78 MMHG | HEART RATE: 81 BPM

## 2021-10-04 DIAGNOSIS — R93.89 ABNORMAL CT OF THE CHEST: ICD-10-CM

## 2021-10-04 DIAGNOSIS — R10.30 LOWER ABDOMINAL PAIN: Primary | ICD-10-CM

## 2021-10-04 DIAGNOSIS — J98.4 CAVITARY LESION OF LUNG: ICD-10-CM

## 2021-10-04 PROCEDURE — 4004F PT TOBACCO SCREEN RCVD TLK: CPT | Performed by: INTERNAL MEDICINE

## 2021-10-04 PROCEDURE — G8484 FLU IMMUNIZE NO ADMIN: HCPCS | Performed by: INTERNAL MEDICINE

## 2021-10-04 PROCEDURE — G8427 DOCREV CUR MEDS BY ELIG CLIN: HCPCS | Performed by: INTERNAL MEDICINE

## 2021-10-04 PROCEDURE — 99214 OFFICE O/P EST MOD 30 MIN: CPT | Performed by: INTERNAL MEDICINE

## 2021-10-04 PROCEDURE — G8417 CALC BMI ABV UP PARAM F/U: HCPCS | Performed by: INTERNAL MEDICINE

## 2021-10-04 ASSESSMENT — ENCOUNTER SYMPTOMS
SHORTNESS OF BREATH: 1
WHEEZING: 0
COUGH: 0

## 2021-10-04 NOTE — ASSESSMENT & PLAN NOTE
Found incidentally on Abd CT 9/2021. Will get Chest CT dedicated. She is minimally symptomatic. DDx includes infection, septic emboli, fungal infection like histo, mycobacterial infection, sarcoid, rheumatoid. Likely will need referral after work up.

## 2021-10-04 NOTE — PROGRESS NOTES
Esperanza Antoine (:  1990) is a 31 y.o. female,Established patient, here for evaluation of the following chief complaint(s):  Follow-up (patient was seen in ED on 21 and a pulmonary nodule was found. )      ASSESSMENT/PLAN:  1. Lower abdominal pain  Comments:  Continues with pelvic pain. Urine testing today. Labs and CT were normal in ER. Rec she d/w Dr. Edie Cantu. Orders:  -     URINALYSIS WITH MICROSCOPIC  -     Culture, Urine  2. Cavitary lesion of lung  Assessment & Plan:  Found incidentally on Abd CT 2021. Will get Chest CT dedicated. She is minimally symptomatic. DDx includes infection, septic emboli, fungal infection like histo, mycobacterial infection, sarcoid, rheumatoid. Likely will need referral after work up. Orders:  -     CT CHEST WO CONTRAST; Future  3. Abnormal CT of the chest  -     CT CHEST WO CONTRAST; Future      Return for as scheduled. SUBJECTIVE/OBJECTIVE:  HPI    Seen in ED for abdominal pain 21. Had negative GC/CT. Negative wet prep. Nl WBC. Some protein in urine and a contaminated UA. IMPRESSION:       1.  No acute findings in the abdomen or pelvis. 2.  Nonspecific 8 mm cavitary lesion within the left lower lobe. Given the patient's age,    infectious etiologies are considered more likely than neoplastic with differential to include a    septic embolism. Consider further evaluation with chest CT and correlate on history/labs.               Abdominal pain coming and going. Constant before starts period and then. Periods have been irregular also. Has had some spotting. Review of Systems   Constitutional: Negative for chills, fatigue and fever. Respiratory: Positive for shortness of breath (occasional). Negative for cough and wheezing. Cardiovascular: Negative for chest pain, palpitations and leg swelling. Genitourinary: Positive for pelvic pain. Negative for difficulty urinating, dysuria, flank pain and frequency.    Psychiatric/Behavioral: Negative for dysphoric mood. Current Outpatient Medications on File Prior to Visit   Medication Sig Dispense Refill    JANUVIA 100 MG tablet TAKE 1 TABLET BY MOUTH EVERY DAY 30 tablet 5    metFORMIN (GLUCOPHAGE-XR) 500 MG extended release tablet TAKE 1 TABLET BY MOUTH EVERY DAY AT NIGHT 90 tablet 1    NIFEdipine (ADALAT CC) 30 MG extended release tablet TAKE 1 TABLET BY MOUTH EVERY DAY 90 tablet 1    amitriptyline (ELAVIL) 25 MG tablet TAKE 1 TABLET BY MOUTH EVERY DAY AT NIGHT 90 tablet 1    Loratadine (CLARITIN PO) Take by mouth      Cholecalciferol 50 MCG (2000 UT) TABS Take 1 tablet by mouth daily 30 tablet 11    OneTouch Delica Lancets 78Z MISC 1 each by Does not apply route daily 100 each 11    Lancet Devices (ONE TOUCH DELICA LANCING DEV) MISC 1 Device by Does not apply route daily 1 each 0    Blood Glucose Monitoring Suppl (ONE TOUCH ULTRA 2) w/Device KIT 1 kit by Does not apply route daily 1 kit 0    blood glucose test strips (ONETOUCH ULTRA) strip 1 each by In Vitro route daily 100 each 11    albuterol sulfate HFA (PROAIR HFA) 108 (90 Base) MCG/ACT inhaler Inhale 2 puffs into the lungs every 6 hours as needed for Wheezing 1 Inhaler 11    [DISCONTINUED] vitamin D (ERGOCALCIFEROL) 1.25 MG (21341 UT) CAPS capsule TAKE 1 CAPSULE BY MOUTH ONE TIME PER WEEK 4 capsule 1     No current facility-administered medications on file prior to visit. Vitals:    10/04/21 1608   BP: 132/78   Pulse: 81   Resp: 14   SpO2: 99%     Physical Exam  Constitutional:       General: She is not in acute distress. Appearance: Normal appearance. She is not diaphoretic. HENT:      Head: Normocephalic and atraumatic. Right Ear: External ear normal.      Left Ear: External ear normal.      Nose: Nose normal.      Mouth/Throat:      Mouth: Mucous membranes are moist.      Pharynx: Oropharynx is clear. Eyes:      General: No scleral icterus.      Conjunctiva/sclera: Conjunctivae normal.   Cardiovascular:      Rate and Rhythm: Normal rate and regular rhythm. Pulses: Normal pulses. Heart sounds: Normal heart sounds. No murmur heard. No friction rub. No gallop. Pulmonary:      Effort: Pulmonary effort is normal.      Breath sounds: Normal breath sounds. No wheezing, rhonchi or rales. Abdominal:      General: Abdomen is flat. Bowel sounds are normal. There is no distension. Tenderness: There is abdominal tenderness (suprapubic pain, bilateral LQs). There is no right CVA tenderness, left CVA tenderness, guarding or rebound. Hernia: No hernia is present. Musculoskeletal:         General: No deformity. Cervical back: Normal range of motion. Right lower leg: No edema. Left lower leg: No edema. Skin:     General: Skin is warm and dry. Findings: No rash. Neurological:      General: No focal deficit present. Mental Status: She is alert. Coordination: Coordination normal.      Gait: Gait normal.   Psychiatric:         Mood and Affect: Mood normal.         Behavior: Behavior normal.                 An electronic signature was used to authenticate this note.     --Nicolas Chen MD

## 2021-10-04 NOTE — LETTER
[facility]  [address] [place], [place] [address]  Phone: [phone]  Fax: [phone]    Petty Gonzalez MD        October 4, 2021    Patient: Indiana Alfonso   YOB: 1990   Date of Visit: 10/4/2021       To Whom It May Concern:    Ashley Hua was seen in my clinic today for evaluation and treatment of a medical condition. Please excuse her absence. If you have any questions or concerns, please don't hesitate to call.     Sincerely,        Petty Gonzalez MD

## 2021-10-05 LAB
BILIRUBIN URINE: NEGATIVE
BLOOD, URINE: ABNORMAL
CLARITY: ABNORMAL
COLOR: YELLOW
EPITHELIAL CELLS, UA: 6 /HPF (ref 0–5)
GLUCOSE URINE: NEGATIVE MG/DL
HYALINE CASTS: 1 /LPF (ref 0–8)
KETONES, URINE: NEGATIVE MG/DL
LEUKOCYTE ESTERASE, URINE: NEGATIVE
MICROSCOPIC EXAMINATION: YES
NITRITE, URINE: NEGATIVE
PH UA: 6 (ref 5–8)
PROTEIN UA: NEGATIVE MG/DL
RBC UA: ABNORMAL /HPF (ref 0–4)
SPECIFIC GRAVITY UA: 1.01 (ref 1–1.03)
URINE TYPE: ABNORMAL
UROBILINOGEN, URINE: 0.2 E.U./DL
WBC UA: 3 /HPF (ref 0–5)

## 2021-10-07 DIAGNOSIS — N39.0 ACUTE UTI: Primary | ICD-10-CM

## 2021-10-07 LAB
ORGANISM: ABNORMAL
URINE CULTURE, ROUTINE: ABNORMAL

## 2021-10-07 RX ORDER — SULFAMETHOXAZOLE AND TRIMETHOPRIM 800; 160 MG/1; MG/1
1 TABLET ORAL 2 TIMES DAILY
Qty: 6 TABLET | Refills: 0 | Status: SHIPPED | OUTPATIENT
Start: 2021-10-07 | End: 2021-10-10

## 2021-10-12 ENCOUNTER — HOSPITAL ENCOUNTER (OUTPATIENT)
Age: 31
Discharge: HOME | End: 2021-10-12
Payer: COMMERCIAL

## 2021-10-12 ENCOUNTER — HOSPITAL ENCOUNTER (OUTPATIENT)
Dept: CT IMAGING | Age: 31
Discharge: HOME | End: 2021-10-12
Payer: COMMERCIAL

## 2021-10-12 DIAGNOSIS — J98.4 CAVITARY LESION OF LUNG: ICD-10-CM

## 2021-10-12 DIAGNOSIS — R93.89 ABNORMAL CT OF THE CHEST: ICD-10-CM

## 2021-10-12 PROCEDURE — 71250 CT THORAX DX C-: CPT

## 2021-10-13 ENCOUNTER — CARE COORDINATION (OUTPATIENT)
Dept: CARE COORDINATION | Age: 31
End: 2021-10-13

## 2021-10-16 DIAGNOSIS — J45.40 MODERATE PERSISTENT ASTHMA WITHOUT COMPLICATION: ICD-10-CM

## 2021-10-18 DIAGNOSIS — J98.4 CAVITARY LESION OF LUNG: Primary | ICD-10-CM

## 2021-10-18 RX ORDER — ALBUTEROL SULFATE 90 UG/1
AEROSOL, METERED RESPIRATORY (INHALATION)
Qty: 8.5 EACH | Refills: 11 | Status: SHIPPED | OUTPATIENT
Start: 2021-10-18 | End: 2022-03-23

## 2021-10-20 NOTE — CARE COORDINATION
Ambulatory Care Coordination Note  10/20/2021  CM Risk Score: 10  Charlson 10 Year Mortality Risk Score: 10%     ACC: Orest Claude, RN    Summary Note:   Patient reports doing well   BG readings low 100's   has not incorporated strength training, but will check out scholarship program w [employer]    Has concerns about pains she is having in pelvic area and family h/o endometreosis that she just found out abut  Needs to schedule w DR Gianna Easton       Scheduled for 10 min to discuss concerns w DR Gianna Easton. PLan   will dc from AC panel at this time  Pt has name and number to call if needs arise      Care Coordination Interventions    Program Enrollment: Complex Care  Referral from Primary Care Provider: No  Suggested Interventions and Community Resources  Diabetes Education: Completed (Comment: 3/1 Donna Hernandez)  Registered Dietician: Completed (Comment: 3/1 Donna Hernandez)  Other Services: Completed (Comment: 7/1 ELADIO Parksshayylauren Daily [phone])  Transportation Support: Completed  Other Services or Interventions: 7/1- discussed [facility] [phone]         Goals Addressed    None         Prior to Admission medications    Medication Sig Start Date End Date Taking?  Authorizing Provider   albuterol sulfate  (90 Base) MCG/ACT inhaler TAKE 2 PUFFS BY MOUTH EVERY 6 HOURS AS NEEDED FOR WHEEZE 10/18/21   Mark Waller MD   JANUVIA 100 MG tablet TAKE 1 TABLET BY MOUTH EVERY DAY 9/23/21   Mark Waller MD   metFORMIN (GLUCOPHAGE-XR) 500 MG extended release tablet TAKE 1 TABLET BY MOUTH EVERY DAY AT NIGHT 9/9/21   Mark Waller MD   NIFEdipine (ADALAT CC) 30 MG extended release tablet TAKE 1 TABLET BY MOUTH EVERY DAY 9/9/21   Mark Waller MD   amitriptyline (ELAVIL) 25 MG tablet TAKE 1 TABLET BY MOUTH EVERY DAY AT NIGHT 6/17/21   Mark Waller MD   Loratadine (CLARITIN PO) Take by mouth    Historical Provider, MD   Cholecalciferol 50 MCG (2000 UT) TABS Take 1 tablet by mouth daily 3/11/21   Ling Moyer MD   OneTouch Delica Lancets 97Y MISC 1 each by Does not apply route daily 3/2/21   Ling Moyer MD   Lancet Devices (ONE TOUCH DELICA LANCING DEV) MISC 1 Device by Does not apply route daily 3/2/21   Ling Moyer MD   Blood Glucose Monitoring Suppl (ONE TOUCH ULTRA 2) w/Device KIT 1 kit by Does not apply route daily 3/2/21   Ling Moyer MD   blood glucose test strips (ONETOUCH ULTRA) strip 1 each by In Vitro route daily 3/2/21   Ling Moyer MD   vitamin D (ERGOCALCIFEROL) 1.25 MG (66627 UT) CAPS capsule TAKE 1 CAPSULE BY MOUTH ONE TIME PER WEEK 2/8/21 3/11/21  Ling Moyer MD       Future Appointments   Date Time Provider Whitney Andre   10/28/2021  9:10 AM Angela Burroughs MD [facility]   12/2/2021  2:00 PM [facility]   8/30/2022  3:40 PM Angela Burroughs MD [facility]

## 2021-11-08 ENCOUNTER — TELEPHONE (OUTPATIENT)
Dept: GYNECOLOGY | Age: 31
End: 2021-11-08

## 2021-11-08 NOTE — TELEPHONE ENCOUNTER
Patient missed apt on 10/27/21 to discuss endometriosis. Festus Howard asleep after work. She would like to reschedule.  Patient can be reached at [phone]

## 2021-11-09 ENCOUNTER — OFFICE VISIT (OUTPATIENT)
Dept: GYNECOLOGY | Age: 31
End: 2021-11-09
Payer: COMMERCIAL

## 2021-11-09 VITALS
WEIGHT: 272 LBS | HEART RATE: 90 BPM | RESPIRATION RATE: 17 BRPM | DIASTOLIC BLOOD PRESSURE: 74 MMHG | HEIGHT: 65 IN | OXYGEN SATURATION: 98 % | BODY MASS INDEX: 45.32 KG/M2 | SYSTOLIC BLOOD PRESSURE: 128 MMHG

## 2021-11-09 DIAGNOSIS — N94.6 DYSMENORRHEA: Primary | ICD-10-CM

## 2021-11-09 DIAGNOSIS — R10.2 PELVIC PAIN IN FEMALE: ICD-10-CM

## 2021-11-09 PROCEDURE — G8417 CALC BMI ABV UP PARAM F/U: HCPCS | Performed by: OBSTETRICS & GYNECOLOGY

## 2021-11-09 PROCEDURE — 99214 OFFICE O/P EST MOD 30 MIN: CPT | Performed by: OBSTETRICS & GYNECOLOGY

## 2021-11-09 PROCEDURE — 4004F PT TOBACCO SCREEN RCVD TLK: CPT | Performed by: OBSTETRICS & GYNECOLOGY

## 2021-11-09 PROCEDURE — G8427 DOCREV CUR MEDS BY ELIG CLIN: HCPCS | Performed by: OBSTETRICS & GYNECOLOGY

## 2021-11-09 PROCEDURE — G8484 FLU IMMUNIZE NO ADMIN: HCPCS | Performed by: OBSTETRICS & GYNECOLOGY

## 2021-11-16 ENCOUNTER — TELEPHONE (OUTPATIENT)
Dept: GYNECOLOGY | Age: 31
End: 2021-11-16

## 2021-11-16 DIAGNOSIS — Z01.818 PRE-OP TESTING: Primary | ICD-10-CM

## 2021-11-16 ASSESSMENT — ENCOUNTER SYMPTOMS
GASTROINTESTINAL NEGATIVE: 1
EYES NEGATIVE: 1
RESPIRATORY NEGATIVE: 1

## 2021-11-16 NOTE — TELEPHONE ENCOUNTER
Called [facility] asking for return call to go over following information with patient re: surgery:    12/22/21 9:30 am [facility] - diagnostic laparoscopy    7:30 am Arrival time to hospital    01/06/22 10:30 am 2 week post op [place]     Order has been placed for a covid screening test, must be done 5 days prior to surgery

## 2021-11-17 ENCOUNTER — TELEPHONE (OUTPATIENT)
Dept: GYNECOLOGY | Age: 31
End: 2021-11-17

## 2021-11-17 NOTE — PROGRESS NOTES
[address] [facility] GYNECOLOGY  [address] [facility] [address]  Dept: [phone]  Dept Fax: [phone]  Loc: [phone]     2021    Rosanne Joseph (:  1990) is a 36 y.o. female, here for evaluation of the following medical concerns:    Patient is here with dysmenorrhea/pelvic pain. For diagnostic laparoscopy. Review of Systems   Constitutional: Negative. HENT: Negative. Eyes: Negative. Respiratory: Negative. Cardiovascular: Negative. Gastrointestinal: Negative. Genitourinary: Positive for menstrual problem and pelvic pain. Musculoskeletal: Negative. Skin: Negative. Neurological: Negative. Psychiatric/Behavioral: Negative. Date of Birth 1990  Past Medical History:   Diagnosis Date    Asthma     Chronic post-traumatic headache, not intractable 3/3/2021    Diabetes mellitus ([facility] [address]     Dysmenorrhea     Essential hypertension 2021    Hyperlipidemia     Lactose intolerance     Menstrual irregularity     Obesity     Tobacco dependence      Past Surgical History:   Procedure Laterality Date    FRACTURE SURGERY      HIP SURGERY Bilateral     for dislocation - ?      OB History    Para Term  AB Living   0 0 0 0 0 0   SAB IAB Ectopic Molar Multiple Live Births   0 0 0 0 0 0     Social History     Socioeconomic History    Marital status: Single     Spouse name: Not on file    Number of children: Not on file    Years of education: Not on file    Highest education level: Not on file   Occupational History    Not on file   Tobacco Use    Smoking status: Current Every Day Smoker     Packs/day: 0.10     Types: Cigarettes     Last attempt to quit: 3/26/2018     Years since quitting: 3.6    Smokeless tobacco: Former User     Quit date: 2017   Vaping Use    Vaping Use: Never used   Substance and Sexual Activity    Alcohol use:  Yes     Alcohol/week: 0.8 standard drinks     Types: 1 Standard drinks or equivalent per week     Comment: once a week    Drug use: No    Sexual activity: Yes     Partners: Male   Other Topics Concern    Not on file   Social History Narrative    Not on file     Social Determinants of Health     Financial Resource Strain:     Difficulty of Paying Living Expenses: Not on file   Food Insecurity:     Worried About Running Out of Food in the Last Year: Not on file    Carmine of Food in the Last Year: Not on file   Transportation Needs:     Lack of Transportation (Medical): Not on file    Lack of Transportation (Non-Medical): Not on file   Physical Activity:     Days of Exercise per Week: Not on file    Minutes of Exercise per Session: Not on file   Stress:     Feeling of Stress : Not on file   Social Connections:     Frequency of Communication with Friends and Family: Not on file    Frequency of Social Gatherings with Friends and Family: Not on file    Attends Zoroastrian Services: Not on file    Active Member of [address] [place] [address] or Organizations: Not on file    Attends Club or Organization Meetings: Not on file    Marital Status: Not on file   Intimate Partner Violence:     Fear of Current or Ex-Partner: Not on file    Emotionally Abused: Not on file    Physically Abused: Not on file    Sexually Abused: Not on file   Housing Stability:     Unable to Pay for Housing in the Last Year: Not on file    Number of [place] in the Last Year: Not on file    Unstable Housing in the Last Year: Not on file     Allergies   Allergen Reactions    Codeine Anaphylaxis, Swelling and Other (See Comments)     Throat swelling.      Lactose Nausea And Vomiting     Outpatient Medications Marked as Taking for the 11/9/21 encounter (Office Visit) with Jayro Gimenez MD   Medication Sig Dispense Refill    albuterol sulfate  (90 Base) MCG/ACT inhaler TAKE 2 PUFFS BY MOUTH EVERY 6 HOURS AS NEEDED FOR WHEEZE 8.5 each 11    JANUVIA 100 MG tablet TAKE 1 TABLET BY MOUTH EVERY DAY 30 tablet 5    metFORMIN (GLUCOPHAGE-XR) 500 MG extended release tablet TAKE 1 TABLET BY MOUTH EVERY DAY AT NIGHT 90 tablet 1    NIFEdipine (ADALAT CC) 30 MG extended release tablet TAKE 1 TABLET BY MOUTH EVERY DAY 90 tablet 1    amitriptyline (ELAVIL) 25 MG tablet TAKE 1 TABLET BY MOUTH EVERY DAY AT NIGHT 90 tablet 1    Loratadine (CLARITIN PO) Take by mouth      Cholecalciferol 50 MCG (2000 UT) TABS Take 1 tablet by mouth daily 30 tablet 11    OneTouch Delica Lancets 67R MISC 1 each by Does not apply route daily 100 each 11    Lancet Devices (ONE TOUCH DELICA LANCING DEV) MISC 1 Device by Does not apply route daily 1 each 0    Blood Glucose Monitoring Suppl (ONE TOUCH ULTRA 2) w/Device KIT 1 kit by Does not apply route daily 1 kit 0    blood glucose test strips (ONETOUCH ULTRA) strip 1 each by In Vitro route daily 100 each 11     Family History   Problem Relation Age of Onset    Cancer Father     Hypertension Father     Hypertension Maternal Aunt      /74 (Site: Right Upper Arm, Position: Sitting, Cuff Size: Large Adult)   Pulse 90   Resp 17   Ht 5' 5\" (1.651 m)   Wt 272 lb (123.4 kg)   LMP 11/04/2021   SpO2 98%   BMI 45.26 kg/m²       Prior to Visit Medications    Medication Sig Taking?  Authorizing Provider   albuterol sulfate  (90 Base) MCG/ACT inhaler TAKE 2 PUFFS BY MOUTH EVERY 6 HOURS AS NEEDED FOR WHEEZE Yes Jae Contreras MD   JANUVIA 100 MG tablet TAKE 1 TABLET BY MOUTH EVERY DAY Yes Jae Contreras MD   metFORMIN (GLUCOPHAGE-XR) 500 MG extended release tablet TAKE 1 TABLET BY MOUTH EVERY DAY AT NIGHT Yes Jae Contreras MD   NIFEdipine (ADALAT CC) 30 MG extended release tablet TAKE 1 TABLET BY MOUTH EVERY DAY Yes Jae Contreras MD   amitriptyline (ELAVIL) 25 MG tablet TAKE 1 TABLET BY MOUTH EVERY DAY AT NIGHT Yes Jae Contreras MD   Loratadine (CLARITIN PO) Take by mouth Yes Historical Provider, MD   Cholecalciferol 50 MCG (2000 UT) TABS Take 1 tablet by mouth daily Yes Jane Izquierdo MD   OneTouch Delica Lancets 70I MISC 1 each by Does not apply route daily Yes Jane Izquierdo MD   Lancet Devices (ONE TOUCH DELICA LANCING DEV) MISC 1 Device by Does not apply route daily Yes Jane Izquierdo MD   Blood Glucose Monitoring Suppl (ONE TOUCH ULTRA 2) w/Device KIT 1 kit by Does not apply route daily Yes Jane Izquierdo MD   blood glucose test strips (ONETOUCH ULTRA) strip 1 each by In Vitro route daily Yes Jane Izquierdo MD   vitamin D (ERGOCALCIFEROL) 1.25 MG (19395 UT) CAPS capsule TAKE 1 CAPSULE BY MOUTH ONE TIME PER MD Renan        Allergies   Allergen Reactions    Codeine Anaphylaxis, Swelling and Other (See Comments)     Throat swelling.  Lactose Nausea And Vomiting       Past Medical History:   Diagnosis Date    Asthma     Chronic post-traumatic headache, not intractable 3/3/2021    Diabetes mellitus ([facility] [address]     Dysmenorrhea     Essential hypertension 8/26/2021    Hyperlipidemia     Lactose intolerance     Menstrual irregularity     Obesity     Tobacco dependence        Past Surgical History:   Procedure Laterality Date    FRACTURE SURGERY      HIP SURGERY Bilateral     for dislocation - ? 2006/2007       Social History     Socioeconomic History    Marital status: Single     Spouse name: Not on file    Number of children: Not on file    Years of education: Not on file    Highest education level: Not on file   Occupational History    Not on file   Tobacco Use    Smoking status: Current Every Day Smoker     Packs/day: 0.10     Types: Cigarettes     Last attempt to quit: 3/26/2018     Years since quitting: 3.6    Smokeless tobacco: Former User     Quit date: 9/18/2017   Vaping Use    Vaping Use: Never used   Substance and Sexual Activity    Alcohol use:  Yes     Alcohol/week: 0.8 standard drinks     Types: 1 Standard drinks or equivalent per week     Comment: once a week    Drug use: No    Sexual activity: Yes     Partners: Male   Other Topics Concern    Not on file   Social History Narrative    Not on file     Social Determinants of Health     Financial Resource Strain:     Difficulty of Paying Living Expenses: Not on file   Food Insecurity:     Worried About Running Out of Food in the Last Year: Not on file    Carmine of Food in the Last Year: Not on file   Transportation Needs:     Lack of Transportation (Medical): Not on file    Lack of Transportation (Non-Medical): Not on file   Physical Activity:     Days of Exercise per Week: Not on file    Minutes of Exercise per Session: Not on file   Stress:     Feeling of Stress : Not on file   Social Connections:     Frequency of Communication with Friends and Family: Not on file    Frequency of Social Gatherings with Friends and Family: Not on file    Attends Hoahaoism Services: Not on file    Active Member of [address] [place] [address] [employer] or Organizations: Not on file    Attends Club or Organization Meetings: Not on file    Marital Status: Not on file   Intimate Partner Violence:     Fear of Current or Ex-Partner: Not on file    Emotionally Abused: Not on file    Physically Abused: Not on file    Sexually Abused: Not on file   Housing Stability:     Unable to Pay for Housing in the Last Year: Not on file    Number of [place] in the Last Year: Not on file    Unstable Housing in the Last Year: Not on file        Family History   Problem Relation Age of Onset    Cancer Father     Hypertension Father     Hypertension Maternal Aunt        Vitals:    11/09/21 0958   BP: 128/74   Site: Right Upper Arm   Position: Sitting   Cuff Size: Large Adult   Pulse: 90   Resp: 17   SpO2: 98%   Weight: 272 lb (123.4 kg)   Height: 5' 5\" (1.651 m)     Estimated body mass index is 45.26 kg/m² as calculated from the following:    Height as of this encounter: 5' 5\" (1.651 m). Weight as of this encounter: 272 lb (123.4 kg). Physical Exam  Constitutional:       General: She is not in acute distress.      Appearance: She is well-developed. HENT:      Head: Normocephalic and atraumatic. Eyes:      Pupils: Pupils are equal, round, and reactive to light. Neck:      Thyroid: No thyromegaly. Cardiovascular:      Rate and Rhythm: Normal rate and regular rhythm. Heart sounds: Normal heart sounds. No murmur heard. No friction rub. No gallop. Pulmonary:      Effort: Pulmonary effort is normal. No respiratory distress. Breath sounds: Normal breath sounds. No wheezing or rales. Abdominal:      General: There is no distension. Palpations: Abdomen is soft. There is no mass. Tenderness: There is no abdominal tenderness. There is no guarding or rebound. Musculoskeletal:         General: No tenderness. Normal range of motion. Cervical back: Normal range of motion and neck supple. Lymphadenopathy:      Cervical: No cervical adenopathy. Skin:     General: Skin is warm and dry. Neurological:      Mental Status: She is alert and oriented to person, place, and time. Psychiatric:         Behavior: Behavior normal.         ASSESSMENT/PLAN:  1. Dysmenorrhea/pelvic pain. For diagnostic laparoscopy. All the risks, benefits alternatives discussed with patient including bleeding, blood transfusion, infection, damage to the bowel, bladder, other local organs with need for secondary surgery, anesthesia risks, blood clots in the lungs, legs, pelvis after surgery. Patient understands these risks and agrees to the procedure. Patient also understands there may be other unforseen risks.

## 2021-11-17 NOTE — TELEPHONE ENCOUNTER
Patient has upcoming surgery with Dr Doristine Heimlich. She was told to call to discuss the details.  Please contact patient at [phone]

## 2021-11-17 NOTE — TELEPHONE ENCOUNTER
Patient has upcoming surgery with Dr Corrine Vasquez. She was told to call to discuss the details.  Please contact patient at [phone]

## 2021-11-17 NOTE — TELEPHONE ENCOUNTER
Called patient back went over everything with her, advised will be mailing a book out to her address, verified address DONE

## 2021-12-02 ENCOUNTER — OFFICE VISIT (OUTPATIENT)
Dept: INTERNAL MEDICINE CLINIC | Age: 31
End: 2021-12-02
Payer: COMMERCIAL

## 2021-12-02 VITALS
OXYGEN SATURATION: 99 % | HEART RATE: 78 BPM | RESPIRATION RATE: 14 BRPM | SYSTOLIC BLOOD PRESSURE: 120 MMHG | BODY MASS INDEX: 45.26 KG/M2 | WEIGHT: 272 LBS | DIASTOLIC BLOOD PRESSURE: 82 MMHG

## 2021-12-02 DIAGNOSIS — E11.9 CONTROLLED TYPE 2 DIABETES MELLITUS WITHOUT COMPLICATION, WITHOUT LONG-TERM CURRENT USE OF INSULIN (HCC): Primary | ICD-10-CM

## 2021-12-02 DIAGNOSIS — Z13.220 ENCOUNTER FOR LIPID SCREENING FOR CARDIOVASCULAR DISEASE: ICD-10-CM

## 2021-12-02 DIAGNOSIS — Z13.6 ENCOUNTER FOR LIPID SCREENING FOR CARDIOVASCULAR DISEASE: ICD-10-CM

## 2021-12-02 DIAGNOSIS — Z01.83 BLOOD TYPING ENCOUNTER: ICD-10-CM

## 2021-12-02 DIAGNOSIS — Z11.3 ROUTINE SCREENING FOR STI (SEXUALLY TRANSMITTED INFECTION): ICD-10-CM

## 2021-12-02 DIAGNOSIS — E55.9 VITAMIN D DEFICIENCY: ICD-10-CM

## 2021-12-02 DIAGNOSIS — Z11.4 ENCOUNTER FOR SCREENING FOR HIV: ICD-10-CM

## 2021-12-02 LAB
CREATININE URINE: 63.1 MG/DL (ref 28–259)
HBA1C MFR BLD: 7.2 %
MICROALBUMIN UR-MCNC: <1.2 MG/DL
MICROALBUMIN/CREAT UR-RTO: NORMAL MG/G (ref 0–30)

## 2021-12-02 PROCEDURE — G8427 DOCREV CUR MEDS BY ELIG CLIN: HCPCS | Performed by: INTERNAL MEDICINE

## 2021-12-02 PROCEDURE — G8484 FLU IMMUNIZE NO ADMIN: HCPCS | Performed by: INTERNAL MEDICINE

## 2021-12-02 PROCEDURE — 2022F DILAT RTA XM EVC RTNOPTHY: CPT | Performed by: INTERNAL MEDICINE

## 2021-12-02 PROCEDURE — 99214 OFFICE O/P EST MOD 30 MIN: CPT | Performed by: INTERNAL MEDICINE

## 2021-12-02 PROCEDURE — 4004F PT TOBACCO SCREEN RCVD TLK: CPT | Performed by: INTERNAL MEDICINE

## 2021-12-02 PROCEDURE — 83036 HEMOGLOBIN GLYCOSYLATED A1C: CPT | Performed by: INTERNAL MEDICINE

## 2021-12-02 PROCEDURE — G8417 CALC BMI ABV UP PARAM F/U: HCPCS | Performed by: INTERNAL MEDICINE

## 2021-12-02 PROCEDURE — 3051F HG A1C>EQUAL 7.0%<8.0%: CPT | Performed by: INTERNAL MEDICINE

## 2021-12-02 ASSESSMENT — ENCOUNTER SYMPTOMS
SHORTNESS OF BREATH: 0
NAUSEA: 0
DIARRHEA: 0
ABDOMINAL PAIN: 0
VOMITING: 0
WHEEZING: 0
COUGH: 0

## 2021-12-02 NOTE — ASSESSMENT & PLAN NOTE
Well-controlled, continue current medications, medication adherence emphasized, lifestyle modifications recommended and blood sugars continue to improve. Way to go!

## 2021-12-02 NOTE — PROGRESS NOTES
Nilda Parisi (:  1990) is a 36 y.o. female,Established patient, here for evaluation of the following chief complaint(s):  Diabetes (wants to discuss taking Hydroxycut (weight loss pill) )      ASSESSMENT/PLAN:  1. Controlled type 2 diabetes mellitus without complication, without long-term current use of insulin ([facility])  Assessment & Plan:   Well-controlled, continue current medications, medication adherence emphasized, lifestyle modifications recommended and blood sugars continue to improve. Way to go! Orders:  -     POCT glycosylated hemoglobin (Hb A1C)  -      DIABETES FOOT EXAM  -     Microalbumin / Creatinine Urine Ratio  -     Comprehensive Metabolic Panel; Future  -     CBC Auto Differential; Future  -     TSH with Reflex; Future  -     Lipid Panel; Future  2. Encounter for lipid screening for cardiovascular disease  -     Lipid Panel; Future  3. Blood typing encounter  -     ABO/RH; Future  4. Vitamin D deficiency  -     Vitamin D 25 Hydroxy; Future  5. Encounter for screening for HIV  -     HIV-1 AND HIV-2 ANTIBODIES; Future  6. Routine screening for STI (sexually transmitted infection)  -     Syphilis Antibody Cascading Reflex; Future    No contraindications to planned surgery upcoming with Dr. Kinsey Pickard. Return in about 3 months (around 3/2/2022) for DM2, + A1C. SUBJECTIVE/OBJECTIVE:  HPI    Taking DM meds. Wants to work on weight loss. Having diagnostic lap for pelvic pain . Review of Systems   Constitutional: Negative for chills, fatigue and fever. Respiratory: Negative for cough, shortness of breath and wheezing. Cardiovascular: Negative for chest pain, palpitations and leg swelling. Gastrointestinal: Negative for abdominal pain, diarrhea, nausea and vomiting. Genitourinary: Positive for pelvic pain.        Current Outpatient Medications on File Prior to Visit   Medication Sig Dispense Refill    albuterol sulfate  (90 Base) MCG/ACT inhaler TAKE 2 PUFFS BY MOUTH EVERY 6 HOURS AS NEEDED FOR WHEEZE 8.5 each 11    JANUVIA 100 MG tablet TAKE 1 TABLET BY MOUTH EVERY DAY 30 tablet 5    metFORMIN (GLUCOPHAGE-XR) 500 MG extended release tablet TAKE 1 TABLET BY MOUTH EVERY DAY AT NIGHT 90 tablet 1    NIFEdipine (ADALAT CC) 30 MG extended release tablet TAKE 1 TABLET BY MOUTH EVERY DAY 90 tablet 1    amitriptyline (ELAVIL) 25 MG tablet TAKE 1 TABLET BY MOUTH EVERY DAY AT NIGHT 90 tablet 1    Loratadine (CLARITIN PO) Take by mouth      Cholecalciferol 50 MCG (2000 UT) TABS Take 1 tablet by mouth daily 30 tablet 11    OneTouch Delica Lancets 17S MISC 1 each by Does not apply route daily 100 each 11    Lancet Devices (ONE TOUCH DELICA LANCING DEV) MISC 1 Device by Does not apply route daily 1 each 0    Blood Glucose Monitoring Suppl (ONE TOUCH ULTRA 2) w/Device KIT 1 kit by Does not apply route daily 1 kit 0    blood glucose test strips (ONETOUCH ULTRA) strip 1 each by In Vitro route daily 100 each 11    [DISCONTINUED] vitamin D (ERGOCALCIFEROL) 1.25 MG (08100 UT) CAPS capsule TAKE 1 CAPSULE BY MOUTH ONE TIME PER WEEK 4 capsule 1     No current facility-administered medications on file prior to visit. Vitals:    12/02/21 1359   BP: 120/82   Pulse: 78   Resp: 14   SpO2: 99%     Physical Exam  Constitutional:       General: She is not in acute distress. Appearance: Normal appearance. She is not diaphoretic. HENT:      Head: Normocephalic and atraumatic. Right Ear: External ear normal.      Left Ear: External ear normal.      Nose: Nose normal.      Mouth/Throat:      Mouth: Mucous membranes are moist.      Pharynx: Oropharynx is clear. Eyes:      General: No scleral icterus. Conjunctiva/sclera: Conjunctivae normal.   Cardiovascular:      Rate and Rhythm: Normal rate and regular rhythm. Pulses: Normal pulses. Heart sounds: Normal heart sounds. No murmur heard. No friction rub. No gallop.     Pulmonary:      Effort: Pulmonary effort is normal.      Breath sounds: Normal breath sounds. No wheezing, rhonchi or rales. Abdominal:      General: Abdomen is flat. Bowel sounds are normal.   Musculoskeletal:         General: No deformity. Cervical back: Normal range of motion. Right lower leg: No edema. Left lower leg: No edema. Skin:     General: Skin is warm and dry. Findings: No rash. Comments: +onychomycosis of anika great toenails. Neurological:      General: No focal deficit present. Mental Status: She is alert. Coordination: Coordination normal.      Gait: Gait normal.   Psychiatric:         Mood and Affect: Mood normal.         Behavior: Behavior normal.       Visual inspection:  Deformity/amputation: absent  Skin lesions/pre-ulcerative calluses: absent  Edema: right- negative, left- negative    Sensory exam:  Monofilament sensation: normal  (minimum of 5 random plantar locations tested, avoiding callused areas - > 1 area with absence of sensation is + for neuropathy)    Plus at least one of the following:  Pulses: normal,       On this date 12/2/2021 I have spent 30 minutes reviewing previous notes, test results and face to face with the patient discussing the diagnosis and importance of compliance with the treatment plan as well as documenting on the day of the visit. An electronic signature was used to authenticate this note.     --Taisha Harrison MD

## 2021-12-03 ENCOUNTER — TELEPHONE (OUTPATIENT)
Dept: INTERNAL MEDICINE CLINIC | Age: 31
End: 2021-12-03

## 2021-12-03 NOTE — TELEPHONE ENCOUNTER
Patient is looking at her lab results on My chart and has a question as to whether or not it shows her blood type. Please call her at number provided.

## 2021-12-06 ENCOUNTER — PATIENT MESSAGE (OUTPATIENT)
Dept: INTERNAL MEDICINE CLINIC | Age: 31
End: 2021-12-06

## 2021-12-07 PROBLEM — U07.1 INFECTION DUE TO 2019-NCOV: Status: ACTIVE | Noted: 2021-12-07

## 2021-12-07 RX ORDER — SODIUM CHLORIDE 9 MG/ML
INJECTION, SOLUTION INTRAVENOUS CONTINUOUS
Status: CANCELLED | OUTPATIENT
Start: 2021-12-08

## 2021-12-07 RX ORDER — DIPHENHYDRAMINE HYDROCHLORIDE 50 MG/ML
50 INJECTION INTRAMUSCULAR; INTRAVENOUS
Status: CANCELLED | OUTPATIENT
Start: 2021-12-08

## 2021-12-07 RX ORDER — ALBUTEROL SULFATE 90 UG/1
4 AEROSOL, METERED RESPIRATORY (INHALATION) PRN
Status: CANCELLED | OUTPATIENT
Start: 2021-12-08

## 2021-12-07 RX ORDER — EPINEPHRINE 1 MG/ML
0.3 INJECTION, SOLUTION, CONCENTRATE INTRAVENOUS PRN
Status: CANCELLED | OUTPATIENT
Start: 2021-12-08

## 2021-12-07 RX ORDER — ACETAMINOPHEN 325 MG/1
650 TABLET ORAL
Status: CANCELLED | OUTPATIENT
Start: 2021-12-08

## 2021-12-07 RX ORDER — ONDANSETRON 2 MG/ML
8 INJECTION INTRAMUSCULAR; INTRAVENOUS
Status: CANCELLED | OUTPATIENT
Start: 2021-12-08

## 2021-12-07 NOTE — TELEPHONE ENCOUNTER
Spoke with patient on phone  She is at high risk of severe disease - Dm2, obesity, unvaccinated. Will refer for Lex.

## 2021-12-08 ENCOUNTER — HOSPITAL ENCOUNTER (OUTPATIENT)
Dept: INFUSION THERAPY | Age: 31
Setting detail: INFUSION SERIES
Discharge: HOME | End: 2021-12-08
Payer: COMMERCIAL

## 2021-12-08 VITALS — HEART RATE: 77 BPM | OXYGEN SATURATION: 99 % | TEMPERATURE: 98.6 F | RESPIRATION RATE: 16 BRPM

## 2021-12-08 DIAGNOSIS — U07.1 INFECTION DUE TO 2019-NCOV: Primary | ICD-10-CM

## 2021-12-08 PROCEDURE — 2580000003 HC RX 258: Performed by: INTERNAL MEDICINE

## 2021-12-08 PROCEDURE — M0243 CASIRIVI AND IMDEVI INFUSION: HCPCS

## 2021-12-08 PROCEDURE — 6360000002 HC RX W HCPCS: Performed by: INTERNAL MEDICINE

## 2021-12-08 RX ORDER — SODIUM CHLORIDE 0.9 % (FLUSH) 0.9 %
5-40 SYRINGE (ML) INJECTION PRN
Status: CANCELLED | OUTPATIENT
Start: 2021-12-08

## 2021-12-08 RX ORDER — SODIUM CHLORIDE 9 MG/ML
INJECTION, SOLUTION INTRAVENOUS CONTINUOUS
Status: CANCELLED | OUTPATIENT
Start: 2021-12-08

## 2021-12-08 RX ORDER — ONDANSETRON 2 MG/ML
8 INJECTION INTRAMUSCULAR; INTRAVENOUS
Status: CANCELLED | OUTPATIENT
Start: 2021-12-08

## 2021-12-08 RX ORDER — DIPHENHYDRAMINE HYDROCHLORIDE 50 MG/ML
50 INJECTION INTRAMUSCULAR; INTRAVENOUS
Status: CANCELLED | OUTPATIENT
Start: 2021-12-08

## 2021-12-08 RX ORDER — ACETAMINOPHEN 325 MG/1
650 TABLET ORAL
Status: CANCELLED | OUTPATIENT
Start: 2021-12-08

## 2021-12-08 RX ORDER — ALBUTEROL SULFATE 90 UG/1
4 AEROSOL, METERED RESPIRATORY (INHALATION) PRN
Status: CANCELLED | OUTPATIENT
Start: 2021-12-08

## 2021-12-08 RX ORDER — ZINC GLUCONATE 50 MG
100 TABLET ORAL DAILY
COMMUNITY
End: 2022-01-06

## 2021-12-08 RX ORDER — ASCORBIC ACID 500 MG
500 TABLET ORAL DAILY
COMMUNITY
End: 2022-04-26 | Stop reason: ALTCHOICE

## 2021-12-08 RX ORDER — EPINEPHRINE 1 MG/ML
0.3 INJECTION, SOLUTION, CONCENTRATE INTRAVENOUS PRN
Status: CANCELLED | OUTPATIENT
Start: 2021-12-08

## 2021-12-08 RX ORDER — SODIUM CHLORIDE 0.9 % (FLUSH) 0.9 %
5-40 SYRINGE (ML) INJECTION PRN
Status: DISCONTINUED | OUTPATIENT
Start: 2021-12-08 | End: 2021-12-08

## 2021-12-08 RX ADMIN — CASIRIVIMAB AND IMDEVIMAB: 600; 600 INJECTION, SOLUTION, CONCENTRATE INTRAVENOUS at 13:33

## 2021-12-08 RX ADMIN — Medication 20 ML: at 14:04

## 2021-12-08 RX ADMIN — Medication 10 ML: at 13:15

## 2021-12-08 ASSESSMENT — PAIN SCALES - GENERAL
PAINLEVEL_OUTOF10: 3
PAINLEVEL_OUTOF10: 5
PAINLEVEL_OUTOF10: 3
PAINLEVEL_OUTOF10: 3

## 2021-12-08 ASSESSMENT — PAIN DESCRIPTION - FREQUENCY: FREQUENCY: CONTINUOUS

## 2021-12-08 ASSESSMENT — PAIN DESCRIPTION - DESCRIPTORS: DESCRIPTORS: ACHING

## 2021-12-08 ASSESSMENT — PAIN DESCRIPTION - PAIN TYPE: TYPE: ACUTE PAIN

## 2021-12-08 ASSESSMENT — PAIN - FUNCTIONAL ASSESSMENT: PAIN_FUNCTIONAL_ASSESSMENT: ACTIVITIES ARE NOT PREVENTED

## 2021-12-08 ASSESSMENT — PAIN DESCRIPTION - LOCATION: LOCATION: BACK

## 2021-12-08 ASSESSMENT — PAIN DESCRIPTION - PROGRESSION: CLINICAL_PROGRESSION: NOT CHANGED

## 2021-12-08 ASSESSMENT — PAIN DESCRIPTION - ORIENTATION: ORIENTATION: LEFT;RIGHT

## 2021-12-08 ASSESSMENT — PAIN DESCRIPTION - ONSET: ONSET: ON-GOING

## 2021-12-08 NOTE — PROGRESS NOTES
[address] [facility]     Regen-COV Visit    NAME:  Babs Haynes  YOB: 1990  MEDICAL RECORD NUMBER:  [id number]  Episode Date:  12/8/2021    Patient arrived to [facility]   [] per wheelchair   [x] ambulatory     Indication for use:      [x] Treatment of Covid 19    [] Post Exposure Prophylaxis            - patient at high risk for progression to severe COVID-19             - patient with immunocompromising condition or is taking immunosuppressive medications            - high risk of exposure due to occurrence of COVID in an institutional setting such as Nursing home, Group home or shelter. Date of COVID test: 12/6/21    Have you received COVID vaccine:No    When did symptoms first appear:12/2/21    What symptoms are you having:     [x] Fever     [x] Cough     [] SOB     [x] Headache     [] Tiredness     [x] Muscle or body aches     [] Loss of taste or smell     [] Sore throat     [x] Congestion or runny nose     [] Nausea or vomiting     [] Diarrhea     []      Pulse 82   Temp 98.6 °F (37 °C) (Oral)   Resp 16   SpO2 99%     Breath Sounds: No increased work of breathing and Good air exchange  Pulse Oximetry: 99 %    Reviewed information on Regen-COV medication ( casirivimab and imdevimab) such as Emergency Use Authorization status, hypersensitivity drug and potential side effects such as fever, chills, headache, nausea, SOB, high or low BP, rapid or slow heart rate, chest discomfort or pain, weakness, confusion, wheezing, swelling of face, lips or throat, rash, hives, itching, feeling faint, dizziness, sweating. Patient given Fact sheet. Yes prior to IV start    Administered via: [x] Peripheral access    [] PICC access    [] Port access    Patient received Regen-COV 1200 mg (casirivimab 600 mg and imdevimab 600 mg) in 100 ml NS IVPB over 30 minutes.   Patient monitored for an hour after infusion    Vital signs done per protocol and are documented on flowsheet:    Patient monitored for 1 hour post infusion. VSS and no complaints. Patient has been given a copy of Regen COV Fact Sheet: Yes  And written instruction with AVS    Response to treatment:  Well tolerated by patient.       Electronically signed by Andrew Granger RN on 12/8/2021 at 2:19 PM

## 2021-12-14 DIAGNOSIS — F51.04 PSYCHOPHYSIOLOGIC INSOMNIA: ICD-10-CM

## 2021-12-14 RX ORDER — AMITRIPTYLINE HYDROCHLORIDE 25 MG/1
TABLET, FILM COATED ORAL
Qty: 90 TABLET | Refills: 1 | Status: SHIPPED | OUTPATIENT
Start: 2021-12-14 | End: 2022-03-23

## 2021-12-28 DIAGNOSIS — Z01.818 PRE-OP TESTING: Primary | ICD-10-CM

## 2022-01-06 NOTE — PROGRESS NOTES
PT TESTED POSITIVE ON 12/4/2021 BUT IS ASYMPTOMATIC  No repeat test needed dos, per protocol  Pt was requested to have Rapid Covid test to be done prior to surgery/procedure. Understands that surgery/procedure maybe subjected to cancel if not completed prior to DOS and to bring copy of rapid testing in DOS. If positive, pt must contact surgeon immediately or with any other questions. Preoperative Screening for Elective Surgery/Invasive Procedures While COVID-19 present in the community     Have you tested positive or have been told to self-isolate for COVID-19 like symptoms within the past 28 days? N   Do you currently have any of the following symptoms? N  o Fever >100.0 F or 99.9 F in immunocompromised patients? N  o New onset cough, shortness of breath or difficulty breathing? N  o New onset sore throat, myalgia (muscle aches and pains), headache, loss of taste/smell or diarrhea? N   Have you had a potential exposure to COVID-19 within the past 14 days by:  o Close contact with a confirmed case? N  o Close contact with a healthcare worker,  or essential infrastructure worker (grocery store, [employer], gas station, public utilities or transportation)? Y  o Do you reside in a congregate setting such as; skilled nursing facility, adult home, correctional facility, homeless shelter or other institutional setting? N  o Have you had recent travel to a known COVID-19 hotspot? N    Indicate if the patient has a positive screen by answering yes to one or more of the above questions. Patients who test positive or screen positive prior to surgery or on the day of surgery should be evaluated in conjunction with the surgeon/proceduralist/anesthesiologist to determine the urgency of the procedure.

## 2022-01-06 NOTE — PROGRESS NOTES
you.    For your comfort, please wear simple loose fitting clothing to the hospital.  Please do not bring valuables. Do not wear any make-up or nail polish on your fingers or toes      For your safety, please do not wear any jewelry or body piercing's on the day of surgery. All jewelry must be removed. If you have dentures, they will be removed before going to operating room. For your convenience, we will provide you with a container. If you wear contact lenses or glasses, they will be removed, please bring a case for them. If you have a living will and a durable power of  for healthcare, please bring in a copy. As part of our patient safety program to minimize surgical site infections, we ask you to do the following:    · Please notify your surgeon if you develop any illness between         now and the  day of your surgery. · This includes a cough, cold, fever, sore throat, nausea,         or vomiting, and diarrhea, etc.  ·  Please notify your surgeon if you experience dizziness, shortness         of breath or blurred vision between now and the time of your surgery. Do not shave your operative site 96 hours prior to surgery. For face and neck surgery, men may use an electric razor 48 hours   prior to surgery. You may shower the night before surgery or the morning of   your surgery with an antibacterial soap. You will need to bring a photo ID and insurance card    [facility] has an onsite pharmacy, would you like to utilize our pharmacy     If you will be staying overnight and use a C-pap machine, please bring   your C-pap to hospital     Our goal is to provide you with excellent care, therefore, visitors will be limited to two(2) in the room at a time so that we may focus on providing this care for you. Please contact pre-admission testing if you have any further questions.                  [facility] phone number:  [phone] PAT fax number: [phone]  Please note these are generalized instructions for all surgical cases, you may be provided with more specific instructions according to your surgery.

## 2022-01-11 ENCOUNTER — ANESTHESIA EVENT (OUTPATIENT)
Dept: OPERATING ROOM | Age: 32
End: 2022-01-11
Payer: COMMERCIAL

## 2022-01-12 ENCOUNTER — ANESTHESIA (OUTPATIENT)
Dept: OPERATING ROOM | Age: 32
End: 2022-01-12
Payer: COMMERCIAL

## 2022-01-12 ENCOUNTER — HOSPITAL ENCOUNTER (OUTPATIENT)
Age: 32
Setting detail: OUTPATIENT SURGERY
Discharge: HOME | End: 2022-01-12
Attending: OBSTETRICS & GYNECOLOGY | Admitting: OBSTETRICS & GYNECOLOGY
Payer: COMMERCIAL

## 2022-01-12 VITALS
SYSTOLIC BLOOD PRESSURE: 171 MMHG | RESPIRATION RATE: 8 BRPM | TEMPERATURE: 98.6 F | DIASTOLIC BLOOD PRESSURE: 97 MMHG | OXYGEN SATURATION: 91 %

## 2022-01-12 VITALS
RESPIRATION RATE: 16 BRPM | TEMPERATURE: 97 F | BODY MASS INDEX: 44.35 KG/M2 | HEIGHT: 65 IN | DIASTOLIC BLOOD PRESSURE: 74 MMHG | HEART RATE: 85 BPM | WEIGHT: 266.21 LBS | OXYGEN SATURATION: 97 % | SYSTOLIC BLOOD PRESSURE: 134 MMHG

## 2022-01-12 DIAGNOSIS — Z98.890 S/P LAPAROSCOPY: Primary | ICD-10-CM

## 2022-01-12 LAB
A/G RATIO: 1.4 (ref 1.1–2.2)
ABO/RH: NORMAL
ALBUMIN SERPL-MCNC: 4.5 G/DL (ref 3.4–5)
ALP BLD-CCNC: 53 U/L (ref 40–129)
ALT SERPL-CCNC: 27 U/L (ref 10–40)
ANION GAP SERPL CALCULATED.3IONS-SCNC: 16 MMOL/L (ref 3–16)
ANTIBODY SCREEN: NORMAL
AST SERPL-CCNC: 26 U/L (ref 15–37)
BILIRUB SERPL-MCNC: 0.3 MG/DL (ref 0–1)
BUN BLDV-MCNC: 6 MG/DL (ref 7–20)
CALCIUM SERPL-MCNC: 9.4 MG/DL (ref 8.3–10.6)
CHLORIDE BLD-SCNC: 104 MMOL/L (ref 99–110)
CO2: 20 MMOL/L (ref 21–32)
CREAT SERPL-MCNC: 0.7 MG/DL (ref 0.6–1.1)
EKG ATRIAL RATE: 69 BPM
EKG DIAGNOSIS: NORMAL
EKG P AXIS: 44 DEGREES
EKG P-R INTERVAL: 170 MS
EKG Q-T INTERVAL: 398 MS
EKG QRS DURATION: 90 MS
EKG QTC CALCULATION (BAZETT): 426 MS
EKG R AXIS: 3 DEGREES
EKG T AXIS: 7 DEGREES
EKG VENTRICULAR RATE: 69 BPM
GFR AFRICAN AMERICAN: >60
GFR NON-AFRICAN AMERICAN: >60
GLUCOSE BLD-MCNC: 166 MG/DL (ref 70–99)
GLUCOSE BLD-MCNC: 170 MG/DL (ref 70–99)
GLUCOSE BLD-MCNC: 283 MG/DL (ref 70–99)
GLUCOSE BLD-MCNC: 296 MG/DL (ref 70–99)
HCG QUALITATIVE: NEGATIVE
HCT VFR BLD CALC: 37.5 % (ref 36–48)
HEMOGLOBIN: 12.7 G/DL (ref 12–16)
MCH RBC QN AUTO: 28.4 PG (ref 26–34)
MCHC RBC AUTO-ENTMCNC: 33.8 G/DL (ref 31–36)
MCV RBC AUTO: 84 FL (ref 80–100)
PDW BLD-RTO: 14.3 % (ref 12.4–15.4)
PERFORMED ON: ABNORMAL
PLATELET # BLD: 257 K/UL (ref 135–450)
PMV BLD AUTO: 7.6 FL (ref 5–10.5)
POTASSIUM REFLEX MAGNESIUM: 3.9 MMOL/L (ref 3.5–5.1)
PREGNANCY, URINE: NEGATIVE
RBC # BLD: 4.46 M/UL (ref 4–5.2)
SODIUM BLD-SCNC: 140 MMOL/L (ref 136–145)
TOTAL PROTEIN: 7.7 G/DL (ref 6.4–8.2)
WBC # BLD: 9.4 K/UL (ref 4–11)

## 2022-01-12 PROCEDURE — 2720000010 HC SURG SUPPLY STERILE: Performed by: OBSTETRICS & GYNECOLOGY

## 2022-01-12 PROCEDURE — 7100000000 HC PACU RECOVERY - FIRST 15 MIN: Performed by: OBSTETRICS & GYNECOLOGY

## 2022-01-12 PROCEDURE — 3600000004 HC SURGERY LEVEL 4 BASE: Performed by: OBSTETRICS & GYNECOLOGY

## 2022-01-12 PROCEDURE — 58660 LAPAROSCOPY LYSIS: CPT | Performed by: OBSTETRICS & GYNECOLOGY

## 2022-01-12 PROCEDURE — 86850 RBC ANTIBODY SCREEN: CPT

## 2022-01-12 PROCEDURE — 7100000010 HC PHASE II RECOVERY - FIRST 15 MIN: Performed by: OBSTETRICS & GYNECOLOGY

## 2022-01-12 PROCEDURE — 6360000002 HC RX W HCPCS: Performed by: OBSTETRICS & GYNECOLOGY

## 2022-01-12 PROCEDURE — 80053 COMPREHEN METABOLIC PANEL: CPT

## 2022-01-12 PROCEDURE — 85027 COMPLETE CBC AUTOMATED: CPT

## 2022-01-12 PROCEDURE — 7100000011 HC PHASE II RECOVERY - ADDTL 15 MIN: Performed by: OBSTETRICS & GYNECOLOGY

## 2022-01-12 PROCEDURE — 2500000003 HC RX 250 WO HCPCS: Performed by: NURSE ANESTHETIST, CERTIFIED REGISTERED

## 2022-01-12 PROCEDURE — 7100000001 HC PACU RECOVERY - ADDTL 15 MIN: Performed by: OBSTETRICS & GYNECOLOGY

## 2022-01-12 PROCEDURE — 3600000014 HC SURGERY LEVEL 4 ADDTL 15MIN: Performed by: OBSTETRICS & GYNECOLOGY

## 2022-01-12 PROCEDURE — 3700000000 HC ANESTHESIA ATTENDED CARE: Performed by: OBSTETRICS & GYNECOLOGY

## 2022-01-12 PROCEDURE — 94640 AIRWAY INHALATION TREATMENT: CPT

## 2022-01-12 PROCEDURE — 93005 ELECTROCARDIOGRAM TRACING: CPT | Performed by: OBSTETRICS & GYNECOLOGY

## 2022-01-12 PROCEDURE — 6370000000 HC RX 637 (ALT 250 FOR IP): Performed by: ANESTHESIOLOGY

## 2022-01-12 PROCEDURE — 93010 ELECTROCARDIOGRAM REPORT: CPT | Performed by: INTERNAL MEDICINE

## 2022-01-12 PROCEDURE — 6360000002 HC RX W HCPCS: Performed by: NURSE ANESTHETIST, CERTIFIED REGISTERED

## 2022-01-12 PROCEDURE — 86901 BLOOD TYPING SEROLOGIC RH(D): CPT

## 2022-01-12 PROCEDURE — 6360000002 HC RX W HCPCS: Performed by: ANESTHESIOLOGY

## 2022-01-12 PROCEDURE — 2580000003 HC RX 258: Performed by: OBSTETRICS & GYNECOLOGY

## 2022-01-12 PROCEDURE — 2500000003 HC RX 250 WO HCPCS: Performed by: OBSTETRICS & GYNECOLOGY

## 2022-01-12 PROCEDURE — 84703 CHORIONIC GONADOTROPIN ASSAY: CPT

## 2022-01-12 PROCEDURE — 2580000003 HC RX 258: Performed by: ANESTHESIOLOGY

## 2022-01-12 PROCEDURE — 86900 BLOOD TYPING SEROLOGIC ABO: CPT

## 2022-01-12 PROCEDURE — 2709999900 HC NON-CHARGEABLE SUPPLY: Performed by: OBSTETRICS & GYNECOLOGY

## 2022-01-12 PROCEDURE — 3700000001 HC ADD 15 MINUTES (ANESTHESIA): Performed by: OBSTETRICS & GYNECOLOGY

## 2022-01-12 RX ORDER — ONDANSETRON 2 MG/ML
INJECTION INTRAMUSCULAR; INTRAVENOUS PRN
Status: DISCONTINUED | OUTPATIENT
Start: 2022-01-12 | End: 2022-01-12 | Stop reason: SDUPTHER

## 2022-01-12 RX ORDER — MIDAZOLAM HYDROCHLORIDE 1 MG/ML
INJECTION INTRAMUSCULAR; INTRAVENOUS PRN
Status: DISCONTINUED | OUTPATIENT
Start: 2022-01-12 | End: 2022-01-12 | Stop reason: SDUPTHER

## 2022-01-12 RX ORDER — FENTANYL CITRATE 50 UG/ML
INJECTION, SOLUTION INTRAMUSCULAR; INTRAVENOUS PRN
Status: DISCONTINUED | OUTPATIENT
Start: 2022-01-12 | End: 2022-01-12 | Stop reason: SDUPTHER

## 2022-01-12 RX ORDER — PROMETHAZINE HYDROCHLORIDE 25 MG/ML
6.25 INJECTION, SOLUTION INTRAMUSCULAR; INTRAVENOUS
Status: DISCONTINUED | OUTPATIENT
Start: 2022-01-12 | End: 2022-01-12 | Stop reason: HOSPADM

## 2022-01-12 RX ORDER — SODIUM CHLORIDE 0.9 % (FLUSH) 0.9 %
5-40 SYRINGE (ML) INJECTION EVERY 12 HOURS SCHEDULED
Status: DISCONTINUED | OUTPATIENT
Start: 2022-01-12 | End: 2022-01-12 | Stop reason: HOSPADM

## 2022-01-12 RX ORDER — FENTANYL CITRATE 50 UG/ML
25 INJECTION, SOLUTION INTRAMUSCULAR; INTRAVENOUS EVERY 5 MIN PRN
Status: DISCONTINUED | OUTPATIENT
Start: 2022-01-12 | End: 2022-01-12 | Stop reason: HOSPADM

## 2022-01-12 RX ORDER — DEXAMETHASONE SODIUM PHOSPHATE 4 MG/ML
INJECTION, SOLUTION INTRA-ARTICULAR; INTRALESIONAL; INTRAMUSCULAR; INTRAVENOUS; SOFT TISSUE PRN
Status: DISCONTINUED | OUTPATIENT
Start: 2022-01-12 | End: 2022-01-12 | Stop reason: SDUPTHER

## 2022-01-12 RX ORDER — LIDOCAINE HYDROCHLORIDE 20 MG/ML
INJECTION, SOLUTION EPIDURAL; INFILTRATION; INTRACAUDAL; PERINEURAL PRN
Status: DISCONTINUED | OUTPATIENT
Start: 2022-01-12 | End: 2022-01-12 | Stop reason: SDUPTHER

## 2022-01-12 RX ORDER — SODIUM CHLORIDE 0.9 % (FLUSH) 0.9 %
10 SYRINGE (ML) INJECTION EVERY 12 HOURS SCHEDULED
Status: DISCONTINUED | OUTPATIENT
Start: 2022-01-12 | End: 2022-01-12 | Stop reason: SDUPTHER

## 2022-01-12 RX ORDER — SODIUM CHLORIDE, SODIUM LACTATE, POTASSIUM CHLORIDE, CALCIUM CHLORIDE 600; 310; 30; 20 MG/100ML; MG/100ML; MG/100ML; MG/100ML
INJECTION, SOLUTION INTRAVENOUS CONTINUOUS
Status: DISCONTINUED | OUTPATIENT
Start: 2022-01-12 | End: 2022-01-12 | Stop reason: HOSPADM

## 2022-01-12 RX ORDER — SODIUM CHLORIDE 9 MG/ML
25 INJECTION, SOLUTION INTRAVENOUS PRN
Status: DISCONTINUED | OUTPATIENT
Start: 2022-01-12 | End: 2022-01-12 | Stop reason: HOSPADM

## 2022-01-12 RX ORDER — ROCURONIUM BROMIDE 10 MG/ML
INJECTION, SOLUTION INTRAVENOUS PRN
Status: DISCONTINUED | OUTPATIENT
Start: 2022-01-12 | End: 2022-01-12 | Stop reason: SDUPTHER

## 2022-01-12 RX ORDER — PROPOFOL 10 MG/ML
INJECTION, EMULSION INTRAVENOUS PRN
Status: DISCONTINUED | OUTPATIENT
Start: 2022-01-12 | End: 2022-01-12 | Stop reason: SDUPTHER

## 2022-01-12 RX ORDER — IPRATROPIUM BROMIDE AND ALBUTEROL SULFATE 2.5; .5 MG/3ML; MG/3ML
1 SOLUTION RESPIRATORY (INHALATION) ONCE
Status: COMPLETED | OUTPATIENT
Start: 2022-01-12 | End: 2022-01-12

## 2022-01-12 RX ORDER — SODIUM CHLORIDE 9 MG/ML
25 INJECTION, SOLUTION INTRAVENOUS PRN
Status: DISCONTINUED | OUTPATIENT
Start: 2022-01-12 | End: 2022-01-12 | Stop reason: SDUPTHER

## 2022-01-12 RX ORDER — OXYCODONE HYDROCHLORIDE AND ACETAMINOPHEN 5; 325 MG/1; MG/1
1-2 TABLET ORAL EVERY 4 HOURS PRN
Qty: 30 TABLET | Refills: 0 | Status: SHIPPED | OUTPATIENT
Start: 2022-01-12 | End: 2022-01-19

## 2022-01-12 RX ORDER — MAGNESIUM HYDROXIDE 1200 MG/15ML
LIQUID ORAL CONTINUOUS PRN
Status: COMPLETED | OUTPATIENT
Start: 2022-01-12 | End: 2022-01-12

## 2022-01-12 RX ORDER — LABETALOL HYDROCHLORIDE 5 MG/ML
INJECTION, SOLUTION INTRAVENOUS PRN
Status: DISCONTINUED | OUTPATIENT
Start: 2022-01-12 | End: 2022-01-12 | Stop reason: SDUPTHER

## 2022-01-12 RX ORDER — BUPIVACAINE HYDROCHLORIDE 2.5 MG/ML
INJECTION, SOLUTION EPIDURAL; INFILTRATION; INTRACAUDAL
Status: COMPLETED | OUTPATIENT
Start: 2022-01-12 | End: 2022-01-12

## 2022-01-12 RX ORDER — MEPERIDINE HYDROCHLORIDE 25 MG/ML
12.5 INJECTION INTRAMUSCULAR; INTRAVENOUS; SUBCUTANEOUS ONCE
Status: COMPLETED | OUTPATIENT
Start: 2022-01-12 | End: 2022-01-12

## 2022-01-12 RX ORDER — OXYCODONE HYDROCHLORIDE AND ACETAMINOPHEN 5; 325 MG/1; MG/1
1 TABLET ORAL EVERY 4 HOURS PRN
Status: DISCONTINUED | OUTPATIENT
Start: 2022-01-12 | End: 2022-01-12 | Stop reason: HOSPADM

## 2022-01-12 RX ORDER — SODIUM CHLORIDE 0.9 % (FLUSH) 0.9 %
5-40 SYRINGE (ML) INJECTION PRN
Status: DISCONTINUED | OUTPATIENT
Start: 2022-01-12 | End: 2022-01-12 | Stop reason: HOSPADM

## 2022-01-12 RX ORDER — SODIUM CHLORIDE 0.9 % (FLUSH) 0.9 %
10 SYRINGE (ML) INJECTION PRN
Status: DISCONTINUED | OUTPATIENT
Start: 2022-01-12 | End: 2022-01-12 | Stop reason: SDUPTHER

## 2022-01-12 RX ORDER — HYDRALAZINE HYDROCHLORIDE 20 MG/ML
INJECTION INTRAMUSCULAR; INTRAVENOUS PRN
Status: DISCONTINUED | OUTPATIENT
Start: 2022-01-12 | End: 2022-01-12 | Stop reason: SDUPTHER

## 2022-01-12 RX ORDER — LABETALOL HYDROCHLORIDE 5 MG/ML
5 INJECTION, SOLUTION INTRAVENOUS EVERY 10 MIN PRN
Status: DISCONTINUED | OUTPATIENT
Start: 2022-01-12 | End: 2022-01-12 | Stop reason: HOSPADM

## 2022-01-12 RX ADMIN — FENTANYL CITRATE 100 MCG: 50 INJECTION INTRAMUSCULAR; INTRAVENOUS at 11:10

## 2022-01-12 RX ADMIN — FENTANYL CITRATE 50 MCG: 50 INJECTION INTRAMUSCULAR; INTRAVENOUS at 10:44

## 2022-01-12 RX ADMIN — LIDOCAINE HYDROCHLORIDE 100 MG: 20 INJECTION, SOLUTION EPIDURAL; INFILTRATION; INTRACAUDAL; PERINEURAL at 10:40

## 2022-01-12 RX ADMIN — SODIUM CHLORIDE: 9 INJECTION, SOLUTION INTRAVENOUS at 10:33

## 2022-01-12 RX ADMIN — PROPOFOL 200 MG: 10 INJECTION, EMULSION INTRAVENOUS at 10:40

## 2022-01-12 RX ADMIN — FENTANYL CITRATE 50 MCG: 50 INJECTION INTRAMUSCULAR; INTRAVENOUS at 10:40

## 2022-01-12 RX ADMIN — METRONIDAZOLE 500 MG: 500 INJECTION, SOLUTION INTRAVENOUS at 10:29

## 2022-01-12 RX ADMIN — ROCURONIUM BROMIDE 10 MG: 10 INJECTION INTRAVENOUS at 11:34

## 2022-01-12 RX ADMIN — IPRATROPIUM BROMIDE AND ALBUTEROL SULFATE 1 AMPULE: 2.5; .5 SOLUTION RESPIRATORY (INHALATION) at 13:06

## 2022-01-12 RX ADMIN — ONDANSETRON 4 MG: 2 INJECTION INTRAMUSCULAR; INTRAVENOUS at 10:29

## 2022-01-12 RX ADMIN — HYDRALAZINE HYDROCHLORIDE 10 MG: 20 INJECTION INTRAMUSCULAR; INTRAVENOUS at 11:39

## 2022-01-12 RX ADMIN — DEXAMETHASONE SODIUM PHOSPHATE 8 MG: 4 INJECTION, SOLUTION INTRAMUSCULAR; INTRAVENOUS at 10:51

## 2022-01-12 RX ADMIN — LABETALOL HYDROCHLORIDE 10 MG: 5 INJECTION, SOLUTION INTRAVENOUS at 11:33

## 2022-01-12 RX ADMIN — INSULIN HUMAN 5 UNITS: 100 INJECTION, SOLUTION PARENTERAL at 13:15

## 2022-01-12 RX ADMIN — SUGAMMADEX 200 MG: 100 INJECTION, SOLUTION INTRAVENOUS at 12:02

## 2022-01-12 RX ADMIN — MIDAZOLAM 2 MG: 1 INJECTION INTRAMUSCULAR; INTRAVENOUS at 10:29

## 2022-01-12 RX ADMIN — FENTANYL CITRATE 25 MCG: 50 INJECTION, SOLUTION INTRAMUSCULAR; INTRAVENOUS at 13:18

## 2022-01-12 RX ADMIN — ROCURONIUM BROMIDE 30 MG: 10 INJECTION INTRAVENOUS at 10:46

## 2022-01-12 RX ADMIN — LABETALOL HYDROCHLORIDE 5 MG: 5 INJECTION, SOLUTION INTRAVENOUS at 11:50

## 2022-01-12 RX ADMIN — PROPOFOL 100 MG: 10 INJECTION, EMULSION INTRAVENOUS at 11:14

## 2022-01-12 RX ADMIN — SODIUM CHLORIDE: 9 INJECTION, SOLUTION INTRAVENOUS at 11:44

## 2022-01-12 RX ADMIN — FENTANYL CITRATE 100 MCG: 50 INJECTION INTRAMUSCULAR; INTRAVENOUS at 12:14

## 2022-01-12 RX ADMIN — HYDROMORPHONE HYDROCHLORIDE 0.5 MG: 1 INJECTION, SOLUTION INTRAMUSCULAR; INTRAVENOUS; SUBCUTANEOUS at 12:51

## 2022-01-12 RX ADMIN — OXYCODONE AND ACETAMINOPHEN 1 TABLET: 5; 325 TABLET ORAL at 14:48

## 2022-01-12 RX ADMIN — LABETALOL HYDROCHLORIDE 5 MG: 5 INJECTION, SOLUTION INTRAVENOUS at 11:24

## 2022-01-12 RX ADMIN — Medication 3000 MG: at 09:21

## 2022-01-12 RX ADMIN — MEPERIDINE HYDROCHLORIDE 12.5 MG: 25 INJECTION INTRAMUSCULAR; INTRAVENOUS; SUBCUTANEOUS at 13:00

## 2022-01-12 RX ADMIN — LABETALOL HYDROCHLORIDE 5 MG: 5 INJECTION, SOLUTION INTRAVENOUS at 11:18

## 2022-01-12 RX ADMIN — LABETALOL HYDROCHLORIDE 5 MG: 5 INJECTION, SOLUTION INTRAVENOUS at 11:28

## 2022-01-12 ASSESSMENT — PULMONARY FUNCTION TESTS
PIF_VALUE: 24
PIF_VALUE: 21
PIF_VALUE: 37
PIF_VALUE: 1
PIF_VALUE: 25
PIF_VALUE: 37
PIF_VALUE: 0
PIF_VALUE: 26
PIF_VALUE: 37
PIF_VALUE: 5
PIF_VALUE: 37
PIF_VALUE: 16
PIF_VALUE: 22
PIF_VALUE: 30
PIF_VALUE: 36
PIF_VALUE: 21
PIF_VALUE: 22
PIF_VALUE: 32
PIF_VALUE: 25
PIF_VALUE: 1
PIF_VALUE: 16
PIF_VALUE: 35
PIF_VALUE: 37
PIF_VALUE: 22
PIF_VALUE: 33
PIF_VALUE: 23
PIF_VALUE: 37
PIF_VALUE: 23
PIF_VALUE: 16
PIF_VALUE: 12
PIF_VALUE: 37
PIF_VALUE: 37
PIF_VALUE: 22
PIF_VALUE: 26
PIF_VALUE: 16
PIF_VALUE: 37
PIF_VALUE: 40
PIF_VALUE: 37
PIF_VALUE: 22
PIF_VALUE: 37
PIF_VALUE: 36
PIF_VALUE: 34
PIF_VALUE: 37
PIF_VALUE: 1
PIF_VALUE: 25
PIF_VALUE: 23
PIF_VALUE: 37
PIF_VALUE: 32
PIF_VALUE: 6
PIF_VALUE: 21
PIF_VALUE: 37
PIF_VALUE: 23
PIF_VALUE: 1
PIF_VALUE: 37
PIF_VALUE: 32
PIF_VALUE: 24
PIF_VALUE: 23
PIF_VALUE: 37
PIF_VALUE: 23
PIF_VALUE: 22
PIF_VALUE: 37
PIF_VALUE: 16
PIF_VALUE: 36
PIF_VALUE: 37
PIF_VALUE: 40
PIF_VALUE: 36
PIF_VALUE: 23
PIF_VALUE: 22
PIF_VALUE: 31
PIF_VALUE: 30
PIF_VALUE: 21
PIF_VALUE: 37
PIF_VALUE: 33
PIF_VALUE: 36
PIF_VALUE: 24
PIF_VALUE: 37
PIF_VALUE: 37
PIF_VALUE: 36
PIF_VALUE: 6
PIF_VALUE: 37
PIF_VALUE: 37
PIF_VALUE: 22
PIF_VALUE: 1
PIF_VALUE: 37
PIF_VALUE: 24
PIF_VALUE: 23
PIF_VALUE: 37
PIF_VALUE: 27
PIF_VALUE: 37
PIF_VALUE: 9
PIF_VALUE: 37
PIF_VALUE: 30
PIF_VALUE: 3
PIF_VALUE: 37
PIF_VALUE: 37
PIF_VALUE: 16
PIF_VALUE: 29
PIF_VALUE: 36
PIF_VALUE: 37
PIF_VALUE: 24
PIF_VALUE: 37

## 2022-01-12 ASSESSMENT — PAIN SCALES - GENERAL
PAINLEVEL_OUTOF10: 5
PAINLEVEL_OUTOF10: 10
PAINLEVEL_OUTOF10: 5
PAINLEVEL_OUTOF10: 4
PAINLEVEL_OUTOF10: 8
PAINLEVEL_OUTOF10: 5

## 2022-01-12 ASSESSMENT — PAIN DESCRIPTION - FREQUENCY
FREQUENCY: CONTINUOUS

## 2022-01-12 ASSESSMENT — PAIN DESCRIPTION - LOCATION
LOCATION: ABDOMEN

## 2022-01-12 ASSESSMENT — PAIN DESCRIPTION - PROGRESSION
CLINICAL_PROGRESSION: NOT CHANGED
CLINICAL_PROGRESSION: NOT CHANGED
CLINICAL_PROGRESSION: GRADUALLY IMPROVING
CLINICAL_PROGRESSION: NOT CHANGED

## 2022-01-12 ASSESSMENT — PAIN - FUNCTIONAL ASSESSMENT
PAIN_FUNCTIONAL_ASSESSMENT: ACTIVITIES ARE NOT PREVENTED
PAIN_FUNCTIONAL_ASSESSMENT: PREVENTS OR INTERFERES SOME ACTIVE ACTIVITIES AND ADLS
PAIN_FUNCTIONAL_ASSESSMENT: 0-10
PAIN_FUNCTIONAL_ASSESSMENT: ACTIVITIES ARE NOT PREVENTED
PAIN_FUNCTIONAL_ASSESSMENT: ACTIVITIES ARE NOT PREVENTED

## 2022-01-12 ASSESSMENT — PAIN DESCRIPTION - ONSET
ONSET: ON-GOING
ONSET: AWAKENED FROM SLEEP
ONSET: ON-GOING
ONSET: ON-GOING

## 2022-01-12 ASSESSMENT — PAIN DESCRIPTION - DESCRIPTORS
DESCRIPTORS: DISCOMFORT
DESCRIPTORS: CRAMPING
DESCRIPTORS: DISCOMFORT
DESCRIPTORS: DISCOMFORT

## 2022-01-12 ASSESSMENT — PAIN DESCRIPTION - PAIN TYPE
TYPE: SURGICAL PAIN

## 2022-01-12 NOTE — PROGRESS NOTES
Patient admitted to PACU from OR. Patient opens eyes to name. Resp easy unlabored on 5LNC with SO2 97%. Abdomen soft. Surgical dressings x 3 dry and intact to abdomen. Patient C/O abdominal cramping at 10 of 10 and medicated per [facility] CRNA. VSS. IV patent.

## 2022-01-12 NOTE — H&P
Patient is a 36year old F who presents with pelvic pain and dysmenorrhea. For diagnostic laparoscopy. Review of Systems: as above  General ROS: negative  Psychological ROS: negative  Ophthalmic ROS: negative  ENT ROS: negative  Allergy and Immunology ROS: negative  Hematological and Lymphatic ROS: negative  Endocrine ROS: negative  Breast ROS: negative  Respiratory ROS: negative  Cardiovascular ROS: negative  Gastrointestinal ROS: negative  Genito-Urinary ROS: as above  Musculoskeletal ROS: negative  Neurological ROS: negative  Dermatological ROS: negative    Date of Birth 1990  Past Medical History:   Diagnosis Date    Asthma     Chronic post-traumatic headache, not intractable 3/3/2021    COVID-19     2021    Diabetes mellitus ([facility] [address]     Dysmenorrhea     Essential hypertension 2021    Hyperlipidemia     Lactose intolerance     Menstrual irregularity     Obesity     Tobacco dependence      Past Surgical History:   Procedure Laterality Date    FRACTURE SURGERY      RIGHT ANKLE    HIP SURGERY Bilateral     for dislocation - ?      OB History    Para Term  AB Living   0 0 0 0 0 0   SAB IAB Ectopic Molar Multiple Live Births   0 0 0 0 0 0     Social History     Socioeconomic History    Marital status: Single     Spouse name: Not on file    Number of children: Not on file    Years of education: Not on file    Highest education level: Not on file   Occupational History    Not on file   Tobacco Use    Smoking status: Current Every Day Smoker     Packs/day: 0.10     Years: 5.00     Pack years: 0.50     Types: Cigarettes     Last attempt to quit: 3/26/2018     Years since quitting: 3.8    Smokeless tobacco: Never Used   Vaping Use    Vaping Use: Never used   Substance and Sexual Activity    Alcohol use:  Yes     Alcohol/week: 0.8 standard drinks     Types: 1 Standard drinks or equivalent per week     Comment: once a week    Drug use: No    Sexual activity: Yes     Partners: Male   Other Topics Concern    Not on file   Social History Narrative    Not on file     Social Determinants of Health     Financial Resource Strain:     Difficulty of Paying Living Expenses: Not on file   Food Insecurity:     Worried About [address] in the Last Year: Not on file    Carmine of Food in the Last Year: Not on file   Transportation Needs:     Lack of Transportation (Medical): Not on file    Lack of Transportation (Non-Medical): Not on file   Physical Activity:     Days of Exercise per Week: Not on file    Minutes of Exercise per Session: Not on file   Stress:     Feeling of Stress : Not on file   Social Connections:     Frequency of Communication with Friends and Family: Not on file    Frequency of Social Gatherings with Friends and Family: Not on file    Attends Baptism Services: Not on file    Active Member of [address] [place] [address] or Organizations: Not on file    Attends Club or Organization Meetings: Not on file    Marital Status: Not on file   Intimate Partner Violence:     Fear of Current or Ex-Partner: Not on file    Emotionally Abused: Not on file    Physically Abused: Not on file    Sexually Abused: Not on file   Housing Stability:     Unable to Pay for Housing in the Last Year: Not on file    Number of [place] in the Last Year: Not on file    Unstable Housing in the Last Year: Not on file     Allergies   Allergen Reactions    Codeine Anaphylaxis, Swelling and Other (See Comments)     Throat swelling.      Lactose Nausea And Vomiting     Outpatient Medications Marked as Taking for the 1/12/22 encounter [facility] Encounter)   Medication Sig Dispense Refill    amitriptyline (ELAVIL) 25 MG tablet TAKE 1 TABLET BY MOUTH EVERY DAY AT NIGHT 90 tablet 1    ascorbic acid (VITAMIN C) 500 MG tablet Take 500 mg by mouth daily      albuterol sulfate  (90 Base) MCG/ACT inhaler TAKE 2 PUFFS BY MOUTH EVERY 6 HOURS AS NEEDED FOR WHEEZE 8.5 each 11    JANUVIA 100 MG tablet TAKE 1 TABLET BY MOUTH EVERY DAY 30 tablet 5    metFORMIN (GLUCOPHAGE-XR) 500 MG extended release tablet TAKE 1 TABLET BY MOUTH EVERY DAY AT NIGHT 90 tablet 1    NIFEdipine (ADALAT CC) 30 MG extended release tablet TAKE 1 TABLET BY MOUTH EVERY DAY 90 tablet 1    Cholecalciferol 50 MCG (2000 UT) TABS Take 1 tablet by mouth daily 30 tablet 11     Family History   Problem Relation Age of Onset    Cancer Father     Hypertension Father     Hypertension Maternal Aunt      BP (!) 135/94   Pulse 79   Temp 97 °F (36.1 °C) (Temporal)   Resp 20   Ht 5' 5\" (1.651 m)   Wt 266 lb 3.3 oz (120.8 kg)   LMP 01/05/2022   SpO2 98%   BMI 44.30 kg/m²     WDWN in NAD  A and O x 3  HEENT-EOMI, mmm  CAR-RRR  Lungs-CTA  ABD-soft, NT, ND, no hsm  EXT-no c/c/e, no cords, NT  Neuro-grossly intact  PV-see prior GYN notes    Plan-patient is a 36year old F with  -Dysmenorrhea/pelvic pain. For diagnostic laparoscopy. All the risks, benefits alternatives discussed with patient including bleeding, blood transfusion, infection, damage to the bowel, bladder, other local organs with need for secondary surgery, anesthesia risks, blood clots in the lungs, legs, pelvis after surgery. Patient understands these risks and agrees to the procedure. Patient also understands there may be other unforseen risks.

## 2022-01-12 NOTE — PROGRESS NOTES
RT @ bedside to administer duoneb. Pt is on RA with 96% O2 sats with c/o chest tightness requesting her inhaler. Duoneb ordered by Dr. Michael Hill.

## 2022-01-12 NOTE — PROGRESS NOTES
Vaginal Sweep Documentation     Vaginal prep sponge count performed by Nate Martin rn and kenyetta mcgraw Count correct. Vaginal sweep performed by dr. Oliva Duran  at 51-41-72-48. No foreign objects or vaginal tears noted.

## 2022-01-12 NOTE — ANESTHESIA PRE PROCEDURE
[facility] Department of Anesthesiology  Pre-Anesthesia Evaluation/Consultation       Name:  Lopez Michael  : 1990  Age:  36 y.o. MRN:  [id number]  Date: 2022           Surgeon: Surgeon(s):  Tori Tate MD    Procedure: Procedure(s):  DIAGNOSTIC LAPAROSCOPY     Allergies   Allergen Reactions    Codeine Anaphylaxis, Swelling and Other (See Comments)     Throat swelling.      Lactose Nausea And Vomiting     Patient Active Problem List   Diagnosis    Menstrual irregularity / AMENORRHEA    Insomnia    Allergic rhinitis    Hyperlipidemia    Asthma, moderate    Vitamin D deficiency    Tinea pedis of both feet    Microalbuminuria    Paresthesia of both feet - toes    Tinea pedis of right foot    Tobacco dependence    Psoriasis    Dysmenorrhea    Onychomycosis    Morbid obesity with BMI of 40.0-44.9, adult ([facility] [address]    Controlled type 2 diabetes mellitus without complication, without long-term current use of insulin (HCC)    Chronic post-traumatic headache, not intractable    Essential hypertension    Cavitary lesion of lung    Infection due to -nCoV     Past Medical History:   Diagnosis Date    Asthma     Chronic post-traumatic headache, not intractable 3/3/2021    COVID-19     2021    Diabetes mellitus ([facility] [address]     Dysmenorrhea     Essential hypertension 2021    Hyperlipidemia     Lactose intolerance     Menstrual irregularity     Obesity     Tobacco dependence      Past Surgical History:   Procedure Laterality Date    FRACTURE SURGERY      RIGHT ANKLE    HIP SURGERY Bilateral     for dislocation - ?      Social History     Tobacco Use    Smoking status: Current Every Day Smoker     Packs/day: 0.10     Years: 5.00     Pack years: 0.50     Types: Cigarettes     Last attempt to quit: 3/26/2018     Years since quitting: 3.8    Smokeless tobacco: Never Used   Vaping Use    Vaping Use: Never used Substance Use Topics    Alcohol use: Yes     Alcohol/week: 0.8 standard drinks     Types: 1 Standard drinks or equivalent per week     Comment: once a week    Drug use: No     Medications  No current facility-administered medications on file prior to encounter.      Current Outpatient Medications on File Prior to Encounter   Medication Sig Dispense Refill    amitriptyline (ELAVIL) 25 MG tablet TAKE 1 TABLET BY MOUTH EVERY DAY AT NIGHT 90 tablet 1    ascorbic acid (VITAMIN C) 500 MG tablet Take 500 mg by mouth daily      albuterol sulfate  (90 Base) MCG/ACT inhaler TAKE 2 PUFFS BY MOUTH EVERY 6 HOURS AS NEEDED FOR WHEEZE 8.5 each 11    JANUVIA 100 MG tablet TAKE 1 TABLET BY MOUTH EVERY DAY 30 tablet 5    metFORMIN (GLUCOPHAGE-XR) 500 MG extended release tablet TAKE 1 TABLET BY MOUTH EVERY DAY AT NIGHT 90 tablet 1    NIFEdipine (ADALAT CC) 30 MG extended release tablet TAKE 1 TABLET BY MOUTH EVERY DAY 90 tablet 1    Cholecalciferol 50 MCG (2000 UT) TABS Take 1 tablet by mouth daily 30 tablet 11    OneTouch Delica Lancets 18N MISC 1 each by Does not apply route daily 100 each 11    Lancet Devices (ONE TOUCH DELICA LANCING DEV) MISC 1 Device by Does not apply route daily 1 each 0    Blood Glucose Monitoring Suppl (ONE TOUCH ULTRA 2) w/Device KIT 1 kit by Does not apply route daily 1 kit 0    blood glucose test strips (ONETOUCH ULTRA) strip 1 each by In Vitro route daily 100 each 11    [DISCONTINUED] vitamin D (ERGOCALCIFEROL) 1.25 MG (36128 UT) CAPS capsule TAKE 1 CAPSULE BY MOUTH ONE TIME PER WEEK 4 capsule 1     Current Facility-Administered Medications   Medication Dose Route Frequency Provider Last Rate Last Admin    sodium chloride flush 0.9 % injection 5-40 mL  5-40 mL IntraVENous 2 times per day Anita Zuluaga MD        sodium chloride flush 0.9 % injection 5-40 mL  5-40 mL IntraVENous PRN Anita Zuluaga MD        0.9 % sodium chloride infusion  25 mL IntraVENous PRN Anita Zuluaga MD        ceFAZolin (ANCEF) 3000 mg in dextrose 5 % 100 mL IVPB  3,000 mg IntraVENous On Call to 1407 Ishan Cochran MD        lactated ringers infusion   IntraVENous Continuous Micky Amin MD        metronidazole (FLAGYL) 500 mg in NaCl 100 mL IVPB premix  500 mg IntraVENous On Call to 1407 Ishan Cochran MD         Vital Signs (Current)   Vitals:    22 0855   BP: (!) 135/94   Pulse: 79   Resp: 20   Temp: 97 °F (36.1 °C)   SpO2: 98%     Vital Signs Statistics (for past 48 hrs)     Temp  Av °F (36.1 °C)  Min: 97 °F (36.1 °C)   Min taken time: 22 08  Max: 97 °F (36.1 °C)   Max taken time: 22 08  Pulse  Av  Min: 78   Min taken time: 22 0855  Max: 79   Max taken time: 22 0855  Resp  Av  Min: 21   Min taken time: 22 0855  Max: 20   Max taken time: 22 0855  BP  Min: 135/94   Min taken time: 22 0855  Max: 135/94   Max taken time: 22 0855  SpO2  Av %  Min: 98 %   Min taken time: 22 0855  Max: 98 %   Max taken time: 22 0855    BP Readings from Last 3 Encounters:   22 (!) 135/94   21 120/82   21 128/74     BMI  Body mass index is 44.3 kg/m². Estimated body mass index is 44.3 kg/m² as calculated from the following:    Height as of this encounter: 5' 5\" (1.651 m). Weight as of this encounter: 266 lb 3.3 oz (120.8 kg).     CBC   Lab Results   Component Value Date    WBC 8.0 2021    RBC 4.43 2021    HGB 12.3 2021    HCT 38.1 2021    MCV 85.9 2021    RDW 14.7 2021     2021     CMP    Lab Results   Component Value Date     2021    K 4.6 2021     2021    CO2 25 2021    BUN 10 2021    CREATININE 0.7 2021    GFRAA >60 2021    AGRATIO 1.7 2021    LABGLOM >60 2021    GLUCOSE 166 2021    PROT 7.3 2021    CALCIUM 10.0 2021    BILITOT <0.2 2021    ALKPHOS 48 2021    AST 14 2021    ALT 19 12/02/2021     BMP    Lab Results   Component Value Date     12/02/2021    K 4.6 12/02/2021     12/02/2021    CO2 25 12/02/2021    BUN 10 12/02/2021    CREATININE 0.7 12/02/2021    CALCIUM 10.0 12/02/2021    GFRAA >60 12/02/2021    LABGLOM >60 12/02/2021    GLUCOSE 166 12/02/2021     POCGlucose  No results for input(s): GLUCOSE in the last 72 hours. Coags  No results found for: PROTIME, INR, APTT  HCG (If Applicable)   Lab Results   Component Value Date    PREGTESTUR negative 08/26/2021      ABGs No results found for: PHART, PO2ART, DPK4VTT, OPS1UKC, BEART, V5TPZEWX   Type & Screen (If Applicable)  No results found for: LABABO, LABRH                         BMI: Wt Readings from Last 3 Encounters:       NPO Status:   Date of last liquid consumption: 01/11/22   Time of last liquid consumption: 2230   Date of last solid food consumption: 01/11/22      Time of last solid consumption: 2230       Anesthesia Evaluation  Patient summary reviewed no history of anesthetic complications:   Airway: Mallampati: III  TM distance: >3 FB   Neck ROM: full   Dental: normal exam         Pulmonary:normal exam    (+) asthma:                            Cardiovascular:  Exercise tolerance: good (>4 METS),   (+) hypertension:,         Rhythm: regular  Rate: normal           Beta Blocker:  Not on Beta Blocker         Neuro/Psych:   (+) headaches:,             GI/Hepatic/Renal:   (+) morbid obesity     (-) GERD       Endo/Other:    (+) DiabetesType II DM, , .                 Abdominal:   (+) obese,           Vascular: negative vascular ROS. Other Findings:             Anesthesia Plan      general     ASA 3       Induction: intravenous. MIPS: Postoperative opioids intended and Prophylactic antiemetics administered. Anesthetic plan and risks discussed with patient. Use of blood products discussed with patient whom consented to blood products. Plan discussed with CRNA.               This pre-anesthesia assessment may be used as a history and physical.    DOS STAFF ADDENDUM:    Pt seen and examined, chart reviewed (including anesthesia, drug and allergy history). No interval changes to history and physical examination. Anesthetic plan, risks, benefits, alternatives, and personnel involved discussed with patient. Questions and concerns addressed. Patient(family) verbalized an understanding and agrees to proceed.       Bing Redding MD  January 12, 2022  9:02 AM

## 2022-01-12 NOTE — PROGRESS NOTES
CLINICAL PHARMACY NOTE: MEDS TO BEDS    Total # of Prescriptions Filled: 1   The following medications were delivered to the patient:  Current Discharge Medication List      START taking these medications    Details   oxyCODONE-acetaminophen (PERCOCET) 5-325 MG per tablet Take 1-2 tablets by mouth every 4 hours as needed for Pain for up to 7 days.   Qty: 30 tablet, Refills: 0    Comments: Reduce doses taken as pain becomes manageable  Associated Diagnoses: S/P laparoscopy         ·   ·     Additional Documentation:

## 2022-01-12 NOTE — PROGRESS NOTES
From PACU. Alert and oriented. C/o 5/10 surgical abd pain. Dressing has a small amount red drainage. Small amount red drainage on niurka pad. abd soft. denies nausea.

## 2022-01-12 NOTE — PROGRESS NOTES
Vss. Dressing remains the same. C/o 5/10 surgical abd pain. Analgesic given. Tolerated sitting up and po fluids and crackers well. BS is 283 and Dr Samira Heller notified. No orders given. patient agreed to taken meds and eat when she gets home. Family at bedside. Verbalized understanding of discharge instructions.

## 2022-01-12 NOTE — LETTER
[facility]  [place]  [address] [address]  Dept: [phone]  Loc: [facility] [address]  [address]           RETURN TO WORK STATUS  1/12/2022      Ms. Pita Chau was unable to be at work today because she accompanied Ms Norma Toth to Kaitlyn Cabrales RN

## 2022-01-12 NOTE — BRIEF OP NOTE
Brief Postoperative Note      Patient: Lopez Michael  YOB: 1990  MRN: [id number]    Date of Procedure: 1/12/2022    Pre-Op Diagnosis: PELVIC PAIN, DYSMENORRHEA    Post-Op Diagnosis: Same, pelvic adhesions       Procedure(s):  DIAGNOSTIC LAPAROSCOPY, LYSIS OF ADHESIONS    Surgeon(s):  Tori Tate MD    Assistant:  Surgical Assistant: Alyse Lopes    Anesthesia: General    Estimated Blood Loss (mL): less than 50 ml    Complications: None    Specimens:   * No specimens in log *    Implants:  * No implants in log *      Drains:   NG/OG/NJ/NE Tube Orogastric (Active)       Findings: adhesions from right ovary to right pelvic sidewall, clubbed end of right ovary, normal left ovary and tube, ?  Small scarring on liver    Electronically signed by Tori Tate MD on 1/12/2022 at 12:36 PM

## 2022-01-12 NOTE — ANESTHESIA POSTPROCEDURE EVALUATION
[facility] Department of Anesthesiology  Post-Anesthesia Note       Name:  Alexia Cutler                                  Age:  36 y.o. MRN:  [id number]     Last Vitals & Oxygen Saturation: /87   Pulse 83   Temp 96.3 °F (35.7 °C) (Temporal)   Resp 18   Ht 5' 5\" (1.651 m)   Wt 266 lb 3.3 oz (120.8 kg)   LMP 01/05/2022   SpO2 99%   BMI 44.30 kg/m²   Patient Vitals for the past 4 hrs:   BP Temp Temp src Pulse Resp SpO2   01/12/22 1315 134/87 96.3 °F (35.7 °C) Temporal 83 18 99 %   01/12/22 1307 -- -- -- -- 17 95 %   01/12/22 1300 130/85 -- -- 87 18 96 %   01/12/22 1250 (!) 142/90 -- -- 87 17 97 %   01/12/22 1245 (!) 146/91 -- -- 87 15 100 %   01/12/22 1244 -- -- -- 88 17 99 %   01/12/22 1243 (!) 145/98 -- -- 86 18 98 %   01/12/22 1242 -- -- -- 86 19 98 %   01/12/22 1241 -- -- -- 86 19 98 %   01/12/22 1240 -- -- -- 87 18 97 %   01/12/22 1239 -- -- -- 86 18 98 %   01/12/22 1238 (!) 140/95 -- -- 87 22 98 %   01/12/22 1237 -- -- -- 87 19 98 %   01/12/22 1236 -- -- -- 87 11 98 %   01/12/22 1235 (!) 138/103 -- -- 88 16 98 %   01/12/22 1234 -- -- -- 89 17 96 %   01/12/22 1233 -- -- -- -- -- 96 %   01/12/22 1232 -- -- -- 87 (!) 34 97 %   01/12/22 1231 (!) 154/123 -- -- 89 (!) 34 99 %   01/12/22 1230 -- -- -- -- -- 98 %   01/12/22 1229 -- -- -- -- -- 100 %   01/12/22 1226 (!) 138/105 97 °F (36.1 °C) Temporal 88 19 96 %       Level of consciousness:  Awake, alert    Respiratory: Respirations easy, no distress. Stable. Cardiovascular: Hemodynamically stable. Hydration: Adequate. PONV: Adequately managed. Post-op pain: Adequately controlled. Post-op assessment: Tolerated anesthetic well without complication. Complications:  None.     Keyur Hood MD  January 12, 2022   1:59 PM

## 2022-01-17 NOTE — OP NOTE
[address] [place] [address] [facility]                                OPERATIVE REPORT    PATIENT NAME: Zofia Gamboa                   :        1990  MED REC NO:   [id number]                          ROOM:  ACCOUNT NO:   [de-identified]                           ADMIT DATE: 2022  PROVIDER:     Keturah Irby MD      DATE OF PROCEDURE:  2022    PREOPERATIVE DIAGNOSES:  Pelvic pain, dysmenorrhea. POSTOPERATIVE DIAGNOSES:  Pelvic pain, dysmenorrhea with pelvic  adhesions. OPERATION PERFORMED:  Diagnostic laparoscopy, lysis of adhesions. SURGEON:  Keturah Irby MD    ANESTHESIA:  General.    EBL:  Less than 50 mL. FINDINGS:  Adhesion from the right ovary to the right pelvic sidewall  To the right ovary. Normal left ovary and tube. Small scarring on  liver. INDICATIONS:  The patient is a 53-year-old female who suffers from  pelvic pain and dysmenorrhea, failed medical therapy and desires surgery  to figure out what is going on. Decision was made to proceed with  diagnostic laparoscopy. All the risks, benefits, and alternatives were  discussed with the patient including the risks of bleeding; blood  transfusion; infection; damage to the bowel, bladder, or other local  organs, need for secondary surgery; blood clots to the lungs, legs, or  pelvis after surgery; anesthesia risk. The patient understands these  risks and agrees to the procedure. OPERATIVE PROCEDURE:  The patient was taken to the operating room where  general anesthesia was found to be adequate. She was prepped and draped  in the normal sterile fashion in the dorsal lithotomy position. Bladder  was emptied prior to the procedure. Pneumo-boots were in place. Cervix  was grasped with a single-tooth tenaculum. HUMI dilator was placed in  the uterus for uterine manipulation.     A 5-mm infraumbilical incision was made with a scalpel after injection  with 0.25% Marcaine. A 5-mm Optiview trocar was placed. The patient  was placed in Trendelenburg. A 5-mm suprapubic incision was made 3 cm  above the pubic bone and 5-mm trocar was placed. A left lower quadrant  incision was made with a scalpel after injection with 0.25% Marcaine and  a 5-mm trocar was placed. Of note, the patient did not tolerate maximum  pneumoperitoneum, so we had to keep the pressure about 13 and had to  decrease her Trendelenburg. However, I was able to visualize the  pelvis. The right ovary was adhered to the pelvic sidewall. The right  fallopian tube appeared clubbed at the end. Left ovary and tube overall  appeared normal.  There was some scant scarring at the liver. Decision  was made to lyse the adhesions. Using the Endo Niall the adhesions  were lysed along where the ovary was adhered to the pelvic sidewall. Care was made to avoid the ureter. The adhesions were made hemostatic  with monopolar cautery. Again, care was made to avoid the ureter at  this point. I attempted to push methylene blue through the uterus to fallopian  tubes, however, did not spill. I am not 100% sure the dye went all the way  through the uterus but right tube did definitely appear clubbed to me. Left fimbria appeared normal.  Irrigation was performed. Hemostasis was  achieved. Decision was made to end the procedure. All the trocars were  removed under direct visualization. HUMI dilator was removed. All  incisions were closed with 4-0 Monocryl in a subcuticular fashion. Steri-Strips and Mastisol were placed. The patient tolerated the  procedure well. All sponge, lap, and needle counts were correct x2.         Radha Dotson MD    D: 01/16/2022 21:29:19       T: 01/16/2022 23:58:58     [id number]  Job#: [id number]     Doc#: [id number]    CC:

## 2022-01-18 DIAGNOSIS — R03.0 BLOOD PRESSURE ELEVATED WITHOUT HISTORY OF HTN: ICD-10-CM

## 2022-01-18 DIAGNOSIS — G44.329 CHRONIC POST-TRAUMATIC HEADACHE, NOT INTRACTABLE: ICD-10-CM

## 2022-01-18 RX ORDER — NIFEDIPINE 30 MG/1
TABLET, FILM COATED, EXTENDED RELEASE ORAL
Qty: 90 TABLET | Refills: 1 | Status: SHIPPED | OUTPATIENT
Start: 2022-01-18 | End: 2022-08-05

## 2022-01-19 ENCOUNTER — TELEPHONE (OUTPATIENT)
Dept: GYNECOLOGY | Age: 32
End: 2022-01-19

## 2022-01-19 ENCOUNTER — OFFICE VISIT (OUTPATIENT)
Dept: GYNECOLOGY | Age: 32
End: 2022-01-19

## 2022-01-19 VITALS
WEIGHT: 267.6 LBS | BODY MASS INDEX: 44.58 KG/M2 | HEART RATE: 86 BPM | RESPIRATION RATE: 17 BRPM | HEIGHT: 65 IN | OXYGEN SATURATION: 98 % | SYSTOLIC BLOOD PRESSURE: 132 MMHG | DIASTOLIC BLOOD PRESSURE: 92 MMHG

## 2022-01-19 DIAGNOSIS — N90.89 VULVAR IRRITATION: Primary | ICD-10-CM

## 2022-01-19 DIAGNOSIS — Z98.890 POST-OPERATIVE STATE: ICD-10-CM

## 2022-01-19 PROCEDURE — 99024 POSTOP FOLLOW-UP VISIT: CPT | Performed by: OBSTETRICS & GYNECOLOGY

## 2022-01-19 RX ORDER — FLUCONAZOLE 150 MG/1
150 TABLET ORAL ONCE
Qty: 1 TABLET | Refills: 1 | Status: SHIPPED | OUTPATIENT
Start: 2022-01-19 | End: 2022-01-19

## 2022-01-19 RX ORDER — NYSTATIN 100000 [USP'U]/G
POWDER TOPICAL 2 TIMES DAILY
Qty: 60 G | Refills: 2 | Status: SHIPPED | OUTPATIENT
Start: 2022-01-19 | End: 2022-04-26 | Stop reason: ALTCHOICE

## 2022-01-19 NOTE — TELEPHONE ENCOUNTER
Patient says incision on waist line, feels like she pulled something, pain getting worse, says it is a \"6\", not able to stand up straight either. Feels irritated in vaginal area, onset Monday. Patient can be reached at [phone]    She uses pharmacy:    [id number], [address] [place] [address]. BRIGID BANGURA. Jamal Walker [phone] - F [phone]    Also she needs letter done stating she had surgery and how long you will be out, she will come pick it up.

## 2022-01-19 NOTE — LETTER
1/19/22        To Whom it may concern:    Please excuse Shameka Pemberton from work due to recent surgery. She needs to be off 1/12/22-1/25/22. Please contact me with any questions.        Sincerely,       Emelia Hamilton

## 2022-01-20 ENCOUNTER — TELEPHONE (OUTPATIENT)
Dept: ADMINISTRATIVE | Age: 32
End: 2022-01-20

## 2022-01-20 ENCOUNTER — TELEPHONE (OUTPATIENT)
Dept: INTERNAL MEDICINE CLINIC | Age: 32
End: 2022-01-20

## 2022-01-20 RX ORDER — ALBUTEROL SULFATE 90 UG/1
2 AEROSOL, METERED RESPIRATORY (INHALATION) EVERY 6 HOURS PRN
Qty: 18 G | Refills: 3 | Status: SHIPPED | OUTPATIENT
Start: 2022-01-20 | End: 2022-01-25 | Stop reason: SDUPTHER

## 2022-01-20 NOTE — TELEPHONE ENCOUNTER
Submitted PA for Albuterol Sulfate  (90 Base)MCG/ACT aerosol  [facility] Key: [id number]. Per plan \"No authorization is required for the medication requested. \"    If this requires a response please respond to the pool. [address])    Please advise patient. Thank you.

## 2022-01-20 NOTE — PROGRESS NOTES
Patient is here for post op visit. Patient feeling sore in the middle/ lower incision. Some vulvar irritation    BP (!) 132/92 (Site: Right Upper Arm, Position: Sitting, Cuff Size: Large Adult)   Pulse 86   Resp 17   Ht 5' 5\" (1.651 m)   Wt 267 lb 9.6 oz (121.4 kg)   LMP 01/05/2022   SpO2 98%   BMI 44.53 kg/m²     WDWN in NAD  A and O x 3  ABD-soft, NT, ND, incision cdi  Ext-no c/c/e, no cords, NT  PV-external vulvar erythema    Plan-s/p laparoscopy nolberto  -healing well  -some mild soreness-discussed watching activity  -discussed findings-concerned about old PID or chlamydia/gc. Patient does not ever remember having this. Reviewed hx and sounds like never did. Findings unusual without this hx.  When ready for pregnancy-needs to see MARINA for further evaluation  -yeast skin-diflucan and Nystatin powder

## 2022-01-20 NOTE — TELEPHONE ENCOUNTER
Asuncion with [facility] is requesting we fax over a new RX for the Albuterol Sulfate but needs to be either Pro-Air or Ventrolin to [facility]. Any questions/concerns please call number provided and an agent can assist you.

## 2022-01-25 ENCOUNTER — TELEPHONE (OUTPATIENT)
Dept: ADMINISTRATIVE | Age: 32
End: 2022-01-25

## 2022-01-27 ENCOUNTER — OFFICE VISIT (OUTPATIENT)
Dept: GYNECOLOGY | Age: 32
End: 2022-01-27

## 2022-01-27 VITALS
HEART RATE: 88 BPM | SYSTOLIC BLOOD PRESSURE: 126 MMHG | DIASTOLIC BLOOD PRESSURE: 80 MMHG | BODY MASS INDEX: 44.18 KG/M2 | WEIGHT: 265.2 LBS | HEIGHT: 65 IN | OXYGEN SATURATION: 98 % | RESPIRATION RATE: 17 BRPM

## 2022-01-27 DIAGNOSIS — Z98.890 POST-OPERATIVE STATE: Primary | ICD-10-CM

## 2022-01-27 PROCEDURE — 99024 POSTOP FOLLOW-UP VISIT: CPT | Performed by: OBSTETRICS & GYNECOLOGY

## 2022-01-27 RX ORDER — TRIAMCINOLONE ACETONIDE 5 MG/G
CREAM TOPICAL 2 TIMES DAILY
Qty: 30 G | Refills: 2 | Status: SHIPPED | OUTPATIENT
Start: 2022-01-27 | End: 2022-05-17

## 2022-01-28 NOTE — PROGRESS NOTES
Patient is here for post op visit. Patient doing well. Patient with some irritation around incisions    /80 (Site: Right Lower Arm, Position: Sitting, Cuff Size: Large Adult)   Pulse 88   Resp 17   Ht 5' 5\" (1.651 m)   Wt 265 lb 3.2 oz (120.3 kg)   LMP 01/05/2022   SpO2 98%   BMI 44.13 kg/m²     WDWN in NAD  A and O x 3  ABD-soft, NT, ND, incision cdi  Ext-no c/c/e, no cords, NT    Plan-s/p laparoscopy, nolberto  -healing well  -steroids for incisions  -Follow-up for annjodi.

## 2022-02-17 ENCOUNTER — TELEPHONE (OUTPATIENT)
Dept: ADMINISTRATIVE | Age: 32
End: 2022-02-17

## 2022-02-17 NOTE — TELEPHONE ENCOUNTER
PA COVER MY MEDS  Medication:Albuterol Sulfate  (90 Base)MCG/ACT aerosol JEFF  Key:[id number]  Status:PENDING

## 2022-02-18 ENCOUNTER — TELEMEDICINE (OUTPATIENT)
Dept: INTERNAL MEDICINE CLINIC | Age: 32
End: 2022-02-18
Payer: COMMERCIAL

## 2022-02-18 DIAGNOSIS — E11.65 UNCONTROLLED TYPE 2 DIABETES MELLITUS WITH HYPERGLYCEMIA (HCC): ICD-10-CM

## 2022-02-18 DIAGNOSIS — J45.40 MODERATE PERSISTENT ASTHMA WITHOUT COMPLICATION: ICD-10-CM

## 2022-02-18 DIAGNOSIS — Z91.89 AT INCREASED RISK OF EXPOSURE TO COVID-19 VIRUS: Primary | ICD-10-CM

## 2022-02-18 PROCEDURE — 4004F PT TOBACCO SCREEN RCVD TLK: CPT | Performed by: INTERNAL MEDICINE

## 2022-02-18 PROCEDURE — G8427 DOCREV CUR MEDS BY ELIG CLIN: HCPCS | Performed by: INTERNAL MEDICINE

## 2022-02-18 PROCEDURE — G8417 CALC BMI ABV UP PARAM F/U: HCPCS | Performed by: INTERNAL MEDICINE

## 2022-02-18 PROCEDURE — 99213 OFFICE O/P EST LOW 20 MIN: CPT | Performed by: INTERNAL MEDICINE

## 2022-02-18 PROCEDURE — 2022F DILAT RTA XM EVC RTNOPTHY: CPT | Performed by: INTERNAL MEDICINE

## 2022-02-18 PROCEDURE — 3046F HEMOGLOBIN A1C LEVEL >9.0%: CPT | Performed by: INTERNAL MEDICINE

## 2022-02-18 PROCEDURE — G8484 FLU IMMUNIZE NO ADMIN: HCPCS | Performed by: INTERNAL MEDICINE

## 2022-02-18 ASSESSMENT — ENCOUNTER SYMPTOMS
SHORTNESS OF BREATH: 0
COUGH: 0
WHEEZING: 0

## 2022-02-18 NOTE — PROGRESS NOTES
Phil Covarrubias (:  1990) is a 36 y.o. female,Established patient, here for evaluation of the following chief complaint(s):   Immunizations (patient would like to speak with Dr Chelsy aBsurto regarding the COVID vaccine, she has multiple questions )      ASSESSMENT/PLAN:  1. At increased risk of exposure to COVID-19 virus  2. Uncontrolled type 2 diabetes mellitus with hyperglycemia ([facility] [address]  3. Moderate persistent asthma without complication    Discussed COVID vaccination. Discussed what makes her high risk for severe COVID infection. Questions answered about vaccines. Return for as scheduled. 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 Will need to get vaccinated. Wants to discuss     Review of Systems   Constitutional: Negative for chills, fatigue and fever. Respiratory: Negative for cough, shortness of breath and wheezing. Cardiovascular: Negative for chest pain, palpitations and leg swelling.        Current Outpatient Medications on File Prior to Visit   Medication Sig Dispense Refill    triamcinolone (ARISTOCORT) 0.5 % cream Apply topically 2 times daily 30 g 2    PROAIR  (90 Base) MCG/ACT inhaler Inhale 2 puffs into the lungs every 6 hours as needed for Wheezing 18 g 3    nystatin (MYCOSTATIN) 247762 UNIT/GM powder Apply topically 2 times daily 60 g 2    NIFEdipine (ADALAT CC) 30 MG extended release tablet TAKE 1 TABLET BY MOUTH EVERY DAY 90 tablet 1    amitriptyline (ELAVIL) 25 MG tablet TAKE 1 TABLET BY MOUTH EVERY DAY AT NIGHT 90 tablet 1    ascorbic acid (VITAMIN C) 500 MG tablet Take 500 mg by mouth daily      albuterol sulfate  (90 Base) MCG/ACT inhaler TAKE 2 PUFFS BY MOUTH EVERY 6 HOURS AS NEEDED FOR WHEEZE 8.5 each 11    JANUVIA 100 MG tablet TAKE 1 TABLET BY MOUTH EVERY DAY 30 tablet 5    metFORMIN (GLUCOPHAGE-XR) 500 MG extended release tablet TAKE 1 TABLET BY MOUTH EVERY DAY AT NIGHT 90 tablet 1    Cholecalciferol 50 MCG (2000) TABS Take 1 tablet by mouth daily 30 tablet 11    OneTouch Delica Lancets 64S MISC 1 each by Does not apply route daily 100 each 11    Lancet Devices (ONE TOUCH DELICA LANCING DEV) MISC 1 Device by Does not apply route daily 1 each 0    Blood Glucose Monitoring Suppl (ONE TOUCH ULTRA 2) w/Device KIT 1 kit by Does not apply route daily 1 kit 0    blood glucose test strips (ONETOUCH ULTRA) strip 1 each by In Vitro route daily 100 each 11    [DISCONTINUED] vitamin D (ERGOCALCIFEROL) 1.25 MG (60977 UT) CAPS capsule TAKE 1 CAPSULE BY MOUTH ONE TIME PER WEEK 4 capsule 1     No current facility-administered medications on file prior to visit. Patient-Reported Vitals 12/21/2020   Patient-Reported Weight 266lb   Patient-Reported Height 5'5\"        General - well developed, well nourished, NAD  Mental status - Awake and alert, Oriented x 3. Follows commands. Eyes - EOMI, Sclera normal, No conjunctival discharge or injection  HENT: NCAT, MMM. Normal external ears  Neck - no visualized masses, nl ROM  Pulmonary/Chest - Speaking in full sentences, effort normal, no distress. MSK - Normal gait with no signs of ataxia. Neuro - No facial asymmetry, no gaze palzy  Skin - no rashes or discoloration of facial skin  Psych - nl appearance and grooming, affect appropriate to mood, no hallucinations  Other pertinent observable physical exam findings-           On this date 2/18/2022 I have spent 20 minutes reviewing previous notes, test results and face to face (virtual) with the patient discussing the diagnosis and importance of compliance with the treatment plan as well as documenting on the day of the visit. Lopez Michael, was evaluated through a synchronous (real-time) audio-video encounter. The patient (or guardian if applicable) is aware that this is a billable service, which includes applicable co-pays. This Virtual Visit was conducted with patient's (and/or legal guardian's) consent.  The visit was conducted pursuant to the emergency declaration under the [facility], [facility] authority and the [employer] General Act. Patient identification was verified, and a caregiver was present when appropriate. The patient was located in a state where the provider was licensed to provide care. Patient identification was verified at the start of the visit: Yes    Services were provided through a video synchronous discussion virtually to substitute for in-person clinic visit. Patient was located at home and provider was located in office or at home. An electronic signature was used to authenticate this note.     --Jae Lizama MD

## 2022-02-21 NOTE — TELEPHONE ENCOUNTER
Albuterol Sulfate  (90 Base)MCG/ACT aerosol  Message from Plan  No authorization is required for the medication requested.

## 2022-03-03 ENCOUNTER — OFFICE VISIT (OUTPATIENT)
Dept: INTERNAL MEDICINE CLINIC | Age: 32
End: 2022-03-03
Payer: COMMERCIAL

## 2022-03-03 VITALS
WEIGHT: 273 LBS | DIASTOLIC BLOOD PRESSURE: 80 MMHG | SYSTOLIC BLOOD PRESSURE: 116 MMHG | OXYGEN SATURATION: 98 % | HEART RATE: 79 BPM | RESPIRATION RATE: 14 BRPM | BODY MASS INDEX: 45.43 KG/M2

## 2022-03-03 DIAGNOSIS — Z11.3 ROUTINE SCREENING FOR STI (SEXUALLY TRANSMITTED INFECTION): ICD-10-CM

## 2022-03-03 DIAGNOSIS — E11.65 UNCONTROLLED TYPE 2 DIABETES MELLITUS WITH HYPERGLYCEMIA (HCC): Primary | ICD-10-CM

## 2022-03-03 DIAGNOSIS — F43.21 ADJUSTMENT DISORDER WITH DEPRESSED MOOD: ICD-10-CM

## 2022-03-03 LAB — HBA1C MFR BLD: 9.5 %

## 2022-03-03 PROCEDURE — G8484 FLU IMMUNIZE NO ADMIN: HCPCS | Performed by: INTERNAL MEDICINE

## 2022-03-03 PROCEDURE — 2022F DILAT RTA XM EVC RTNOPTHY: CPT | Performed by: INTERNAL MEDICINE

## 2022-03-03 PROCEDURE — 99214 OFFICE O/P EST MOD 30 MIN: CPT | Performed by: INTERNAL MEDICINE

## 2022-03-03 PROCEDURE — G8417 CALC BMI ABV UP PARAM F/U: HCPCS | Performed by: INTERNAL MEDICINE

## 2022-03-03 PROCEDURE — 3046F HEMOGLOBIN A1C LEVEL >9.0%: CPT | Performed by: INTERNAL MEDICINE

## 2022-03-03 PROCEDURE — G8427 DOCREV CUR MEDS BY ELIG CLIN: HCPCS | Performed by: INTERNAL MEDICINE

## 2022-03-03 PROCEDURE — 83036 HEMOGLOBIN GLYCOSYLATED A1C: CPT | Performed by: INTERNAL MEDICINE

## 2022-03-03 PROCEDURE — 4004F PT TOBACCO SCREEN RCVD TLK: CPT | Performed by: INTERNAL MEDICINE

## 2022-03-03 RX ORDER — DULAGLUTIDE 0.75 MG/.5ML
0.75 INJECTION, SOLUTION SUBCUTANEOUS WEEKLY
Qty: 4 PEN | Refills: 2 | Status: SHIPPED | OUTPATIENT
Start: 2022-03-03 | End: 2022-06-14

## 2022-03-03 ASSESSMENT — PATIENT HEALTH QUESTIONNAIRE - PHQ9
SUM OF ALL RESPONSES TO PHQ QUESTIONS 1-9: 18
SUM OF ALL RESPONSES TO PHQ QUESTIONS 1-9: 18
SUM OF ALL RESPONSES TO PHQ9 QUESTIONS 1 & 2: 3
1. LITTLE INTEREST OR PLEASURE IN DOING THINGS: 1
9. THOUGHTS THAT YOU WOULD BE BETTER OFF DEAD, OR OF HURTING YOURSELF: 0
5. POOR APPETITE OR OVEREATING: 3
SUM OF ALL RESPONSES TO PHQ QUESTIONS 1-9: 18
3. TROUBLE FALLING OR STAYING ASLEEP: 3
7. TROUBLE CONCENTRATING ON THINGS, SUCH AS READING THE NEWSPAPER OR WATCHING TELEVISION: 2
SUM OF ALL RESPONSES TO PHQ QUESTIONS 1-9: 18
2. FEELING DOWN, DEPRESSED OR HOPELESS: 2
4. FEELING TIRED OR HAVING LITTLE ENERGY: 2
8. MOVING OR SPEAKING SO SLOWLY THAT OTHER PEOPLE COULD HAVE NOTICED. OR THE OPPOSITE, BEING SO FIGETY OR RESTLESS THAT YOU HAVE BEEN MOVING AROUND A LOT MORE THAN USUAL: 2
6. FEELING BAD ABOUT YOURSELF - OR THAT YOU ARE A FAILURE OR HAVE LET YOURSELF OR YOUR FAMILY DOWN: 3
10. IF YOU CHECKED OFF ANY PROBLEMS, HOW DIFFICULT HAVE THESE PROBLEMS MADE IT FOR YOU TO DO YOUR WORK, TAKE CARE OF THINGS AT HOME, OR GET ALONG WITH OTHER PEOPLE: 1

## 2022-03-03 ASSESSMENT — ENCOUNTER SYMPTOMS
COUGH: 0
SHORTNESS OF BREATH: 0
CONSTIPATION: 0
VOMITING: 0
DIARRHEA: 0
NAUSEA: 1
WHEEZING: 0
ABDOMINAL PAIN: 0

## 2022-03-03 NOTE — PROGRESS NOTES
Bennie Jurado (:  1990) is a 36 y.o. female,Established patient, here for evaluation of the following chief complaint(s):  Diabetes      ASSESSMENT/PLAN:  1. Uncontrolled type 2 diabetes mellitus with hyperglycemia (HCC)  -     POCT glycosylated hemoglobin (Hb A1C)  -     Dulaglutide (TRULICITY) 0.30 XB/6.3GJ SOPN; Inject 0.75 mg into the skin once a week, Disp-4 pen, R-2Normal      No follow-ups on file. SUBJECTIVE/OBJECTIVE:  HPI    Has been feeling more depressed. Wants to do some therapy.          Review of Systems    Current Outpatient Medications on File Prior to Visit   Medication Sig Dispense Refill    PROAIR  (90 Base) MCG/ACT inhaler Inhale 2 puffs into the lungs every 6 hours as needed for Wheezing 18 g 3    nystatin (MYCOSTATIN) 437984 UNIT/GM powder Apply topically 2 times daily 60 g 2    NIFEdipine (ADALAT CC) 30 MG extended release tablet TAKE 1 TABLET BY MOUTH EVERY DAY 90 tablet 1    amitriptyline (ELAVIL) 25 MG tablet TAKE 1 TABLET BY MOUTH EVERY DAY AT NIGHT 90 tablet 1    ascorbic acid (VITAMIN C) 500 MG tablet Take 500 mg by mouth daily      albuterol sulfate  (90 Base) MCG/ACT inhaler TAKE 2 PUFFS BY MOUTH EVERY 6 HOURS AS NEEDED FOR WHEEZE 8.5 each 11    JANUVIA 100 MG tablet TAKE 1 TABLET BY MOUTH EVERY DAY 30 tablet 5    metFORMIN (GLUCOPHAGE-XR) 500 MG extended release tablet TAKE 1 TABLET BY MOUTH EVERY DAY AT NIGHT 90 tablet 1    Cholecalciferol 50 MCG ( UT) TABS Take 1 tablet by mouth daily 30 tablet 11    OneTouch Delica Lancets 19N MISC 1 each by Does not apply route daily 100 each 11    Lancet Devices (ONE TOUCH DELICA LANCING DEV) MISC 1 Device by Does not apply route daily 1 each 0    Blood Glucose Monitoring Suppl (ONE TOUCH ULTRA 2) w/Device KIT 1 kit by Does not apply route daily 1 kit 0    blood glucose test strips (ONETOUCH ULTRA) strip 1 each by In Vitro route daily 100 each 11    [DISCONTINUED] vitamin D (ERGOCALCIFEROL) 1.25 MG (30783 UT) CAPS capsule TAKE 1 CAPSULE BY MOUTH ONE TIME PER WEEK 4 capsule 1     No current facility-administered medications on file prior to visit. Vitals:    03/03/22 1450   BP: 116/80   Pulse: 79   Resp: 14   SpO2: 98%     Physical Exam  Constitutional:       General: She is not in acute distress. Appearance: Normal appearance. She is not diaphoretic. HENT:      Head: Normocephalic and atraumatic. Right Ear: Tympanic membrane, ear canal and external ear normal.      Left Ear: Tympanic membrane, ear canal and external ear normal.      Nose: Nose normal.      Mouth/Throat:      Mouth: Mucous membranes are moist.      Pharynx: Oropharynx is clear. Eyes:      General: No scleral icterus. Conjunctiva/sclera: Conjunctivae normal.   Cardiovascular:      Rate and Rhythm: Normal rate and regular rhythm. Pulses: Normal pulses. Heart sounds: Normal heart sounds. No murmur heard. No friction rub. No gallop. Pulmonary:      Effort: Pulmonary effort is normal.      Breath sounds: Normal breath sounds. No wheezing, rhonchi or rales. Abdominal:      General: Abdomen is flat. Bowel sounds are normal.   Musculoskeletal:         General: No deformity. Cervical back: Normal range of motion. Right lower leg: No edema. Left lower leg: No edema. Skin:     General: Skin is warm and dry. Findings: No rash. Neurological:      General: No focal deficit present. Mental Status: She is alert. Coordination: Coordination normal.      Gait: Gait normal.   Psychiatric:         Mood and Affect: Mood normal.         Behavior: Behavior normal.                 An electronic signature was used to authenticate this note.     --Alice Cotter MD

## 2022-03-03 NOTE — PROGRESS NOTES
Presley Montano (:  1990) is a 36 y.o. female,Established patient, here for evaluation of the following chief complaint(s):  Diabetes      ASSESSMENT/PLAN:  1. Uncontrolled type 2 diabetes mellitus with hyperglycemia ([facility] [address]  Assessment & Plan:  Poorly controlled, continue current medications, medication adherence emphasized, lifestyle modifications recommended and blood sugars continue to improve. Discussed changing from [place] [facility] [place] to trulicity. We will proceed with this. Continue metformin. Orders:  -     POCT glycosylated hemoglobin (Hb A1C)  -     Dulaglutide (TRULICITY) 8.49 FQ/5.7AR SOPN; Inject 0.75 mg into the skin once a week, Disp-4 pen, R-2Normal  2. Routine screening for STI (sexually transmitted infection)  -     C.trachomatis N.gonorrhoeae DNA, Urine  3. Adjustment disorder with depressed mood  Comments:  Declines meds. No SI/HI. Refer to psychotherapy. Return in about 6 weeks (around 2022). SUBJECTIVE/OBJECTIVE:  HPI    She is here today for routine follow-up. Her blood sugars have mostly been 100s at home. She has not had much appetite lately. She has been feeling kind of depressed. She has been drinking a lot of water. Mood has been low after a job rejection has been worse. Does not want meds    Review of Systems   Constitutional: Negative for chills, fatigue and fever. Respiratory: Negative for cough, shortness of breath and wheezing. Cardiovascular: Negative for chest pain, palpitations and leg swelling. Gastrointestinal: Positive for nausea. Negative for abdominal pain, constipation, diarrhea and vomiting. Psychiatric/Behavioral: Positive for dysphoric mood and sleep disturbance. The patient is not nervous/anxious.         Current Outpatient Medications on File Prior to Visit   Medication Sig Dispense Refill    PROAIR  (90 Base) MCG/ACT inhaler Inhale 2 puffs into the lungs every 6 hours as needed for Wheezing 18 g 3    nystatin (MYCOSTATIN) 464714 UNIT/GM powder Apply topically 2 times daily 60 g 2    NIFEdipine (ADALAT CC) 30 MG extended release tablet TAKE 1 TABLET BY MOUTH EVERY DAY 90 tablet 1    amitriptyline (ELAVIL) 25 MG tablet TAKE 1 TABLET BY MOUTH EVERY DAY AT NIGHT 90 tablet 1    ascorbic acid (VITAMIN C) 500 MG tablet Take 500 mg by mouth daily      albuterol sulfate  (90 Base) MCG/ACT inhaler TAKE 2 PUFFS BY MOUTH EVERY 6 HOURS AS NEEDED FOR WHEEZE 8.5 each 11    JANUVIA 100 MG tablet TAKE 1 TABLET BY MOUTH EVERY DAY 30 tablet 5    metFORMIN (GLUCOPHAGE-XR) 500 MG extended release tablet TAKE 1 TABLET BY MOUTH EVERY DAY AT NIGHT 90 tablet 1    Cholecalciferol 50 MCG (2000 UT) TABS Take 1 tablet by mouth daily 30 tablet 11    OneTouch Delica Lancets 31P MISC 1 each by Does not apply route daily 100 each 11    Lancet Devices (ONE TOUCH DELICA LANCING DEV) MISC 1 Device by Does not apply route daily 1 each 0    Blood Glucose Monitoring Suppl (ONE TOUCH ULTRA 2) w/Device KIT 1 kit by Does not apply route daily 1 kit 0    blood glucose test strips (ONETOUCH ULTRA) strip 1 each by In Vitro route daily 100 each 11    [DISCONTINUED] vitamin D (ERGOCALCIFEROL) 1.25 MG (26530 UT) CAPS capsule TAKE 1 CAPSULE BY MOUTH ONE TIME PER WEEK 4 capsule 1     No current facility-administered medications on file prior to visit. Vitals:    03/03/22 1450   BP: 116/80   Pulse: 79   Resp: 14   SpO2: 98%     Physical Exam  Constitutional:       General: She is not in acute distress. Appearance: Normal appearance. She is not diaphoretic. HENT:      Head: Normocephalic and atraumatic. Right Ear: Tympanic membrane, ear canal and external ear normal.      Left Ear: Tympanic membrane, ear canal and external ear normal.      Nose: Nose normal.      Mouth/Throat:      Mouth: Mucous membranes are moist.      Pharynx: Oropharynx is clear. Eyes:      General: No scleral icterus.      Conjunctiva/sclera: Conjunctivae normal.   Cardiovascular: Rate and Rhythm: Normal rate and regular rhythm. Pulses: Normal pulses. Heart sounds: Normal heart sounds. No murmur heard. No friction rub. No gallop. Pulmonary:      Effort: Pulmonary effort is normal.      Breath sounds: Normal breath sounds. No wheezing, rhonchi or rales. Abdominal:      General: Abdomen is flat. Bowel sounds are normal.   Musculoskeletal:         General: No deformity. Cervical back: Normal range of motion. Right lower leg: No edema. Left lower leg: No edema. Skin:     General: Skin is warm and dry. Findings: No rash. Neurological:      General: No focal deficit present. Mental Status: She is alert. Coordination: Coordination normal.      Gait: Gait normal.   Psychiatric:         Mood and Affect: Mood normal.         Behavior: Behavior normal.           On this date 3/3/2022 I have spent 30 minutes reviewing previous notes, test results and face to face with the patient discussing the diagnosis and importance of compliance with the treatment plan as well as documenting on the day of the visit. An electronic signature was used to authenticate this note.     --Genesis Grewal MD

## 2022-03-03 NOTE — PATIENT INSTRUCTIONS
Table of Contents  1. Evaluations  a. Adult ADHD evaluations  b. Child ADHD/learning disability evaluations  c. Neuropsychological Evaluations for Cognitive Deficits  2. Psychiatry  3. Psychotherapy  a. Vass Eze   b. Private insurance or self-pay  c. Medicare  d. Medicaid  e. PTSD therapy (only for those who want to directly address/discuss trauma)  f. Long-term therapists for children  g. Couples Counseling  h. Eating Disorders  i. Sexual Addictions  j. OCD  4. Higher levels of care for mental health  5. Substance Use Treatment  a. Outpatient  b. Residential or Intensive Outpatient   6. [address] (by county, useful for those with Medicaid who need psychotherapy or psychiatry)  a. Josh Coughlin  bCorby Leung  7. Support Telephone Line                    1. Evaluations  a. Adult ADHD evaluations  o [facility]  i. Accepts most insurances  [address], [address]  iii. Phone: [phone]  iv. [place] Micro.Fanzila    o [address]  i. They will try to run your insurance  ii. Often times self-pay and can be expensive, so ask about cost  iii. [address], [place], [address] [place] [address]  iv. Phone: [phone]  v. ClearRisk.DocRun com/  o Katkin and [place]  i. Portion of evaluation is not covered by insurance: $250.00  ii. Other portion may be able to be billed to insurance  iii. [address]  iv. [phone]  v. http://www.Orestes.LincolnHealth/. com/  o David Long, Ph.D.  i. Halley Click, and may also accept other insurances  ii. [address], [address]  iii. [phone]  iv. https:[email or web address] Biosciencesv. Selinda Hodgkins, Ph.D.  i. Yessy NovaThermal EnergyCam  ii. [address], [facility] [address]  iii.  [phone]  iv. PromotionalReview.nl  o If a college student is looking for testing, check with Benedict to see if they offer testing as part of their tuition. b. Child ADHD/learning disability evaluations  o [facility]  i. [phone]  ii. 4385 [facility], [facility] 70  i. They will try to run your insurance  ii. Often times self-pay and can be expensive, so ask about cost  iii. [address] [place], [address] [place] [address]  iv. Phone: [phone]  v. Orbis Biosciences.PerSay. com/    o Xenia Robles, Ph.D.  i. Dagmar Evans, and may also accept other insurances  ii. Sees children, adolescents, and young adults  iii. [address], [facility], [address] [place] [address] Road  iv. [phone]  v. https://[email or web address]  toribio Coe@Packet Design    o [facility]  i. Portion of evaluation is not covered by insurance: $250.00  ii. Other portion may be able to be billed to insurance  iii. [address], [address]  iv. [phone]  v. http://www.Meridian.[facility]/. com/    o [address] [place] [address] [address]  ii. [phone] x2  iii. [email or web address] Design. Easydiagnosis  iv. https://One On One.Easydiagnosis/contact-me    c. Neuropsychological Evaluations for Cognitive Deficits  o [facility]  i. Michelle Delatorre, [address], [facility], 1300 [facility], Esté Debra, Eric, Daktari Diagnostics  ii. Multiple locations in [facility], [facility]  iii. [phone]  iv. Richard. 1 [facility]  ii. [address] [place], [address], [place], [address]  iii. [phone]  iv. Cincinnatigeriatricpsych. Diego Castaneda, Ph.D.  i. Humana, [employer], Cam Richardson  ii. 4147 [facility], [facility] 95 [address]  iii. [phone]  iv. PromotionalReview.nl    2. [address], James Shelby  ii. [phone]  iii. Rollad. Easydiagnosis    o [facility]  i. Accept most insurances, but not medicaid or medicare  ii. [address], [facility], [address]  iii. [phone]  iv. https://www. Stentysych. com    o LifeStance (formerly PsychBC)  i. Accepts most insurances  ii. Many locations  iii. [phone]  iv. https://FabAlley/. com  v. Recommend scheduling online because telephone can have long wait times  o NoInsuranceAgent.TravelLine. com/us/psychiatrists/oh/carter  i. Can utilize this website and filter by location and insurance  3. Psychotherapy  a. [address] [address] (Psychotherapy or Psychiatry)  o  LifeStance (formerly PsychBC)  i. Accepts most insurances  ii. Many locations  iii. [phone]  iv. https://FabAlley/. com  v. Recommend scheduling online because telephone can have long wait times    b. Private Insurance/Self-pay  o Syed Chavez. Marisol Griffin  i. Congregational Psychologist  ii. Private insurance and medicare  iii. [address]20 [facility], 103 [facility]  iv. [phone]  v. CoachingBuilder.es    o [facility]  i. Two locations  ii. [phone]  iii. GiftContent.Deitek Systems. com    o Adult Child Family Counseling of 1000 [facility] 1201 [facility], [address]  ii. [phone]  iii. BridgeDigest.is    o Counseling [place]       i. counselingBid Nerd. Greenbird Integration Technology       ii. [phone]  iii. 8489 [facility], [address]  iv. 100-155 per session  v. Individual, couples, and group counseling  vi. Do not bill insurance, but can provide you with bills that you can submit to your insurance to try to obtain reimbursement     o [facility] and [address] 1936 and [place] Advantage  ii. 4800 Memorial , [address] [place] [address], [address]  iii.  [phone]  iv. www.AerospikeWomen & Infants Hospital of Rhode IslandRescale.Greenbird Integration Technology  v. Phyllis@Teacher Training Institute.Greenbird Integration Technology    o ThrivePointe  i. Mainly self-pay, but will at times offer services for those with financial limitations  ii. [address], Danica Hensley, [address]  iii. 46 [place], [place]   iv. [phone]  love Iraheta@SciFluor Life Sciences. com  vi. Momo    o [facility]   i. Appears to be mainly self-pay, but may call to see if your insurance is accepted  ii. [address], 2907 I[address] YoderMoni [place]  iii. [phone]  iv. https://Location/    o The [address] [place] [address]. They do not bill insurance, but will provide you with a receipt for you to try to get reimbursed  ii. Considered out-of-network providers  iii. [address], Sharad, 727 [facility]  iv. Phone: [phone]  v. Pocket Gems/    o [facility]  i. Accept many private insurance plans  ii. Henry, New braunfels, and Maryanneer Communications  iii. [phone]  iv. www.AisleFinder    o JFrog  i. Accepts many insurances and also offers self-pay discounts  ii. Many different locations across [place] and [place]  iii. [phone]  iv. www.Great Technology. Guojia New Materials    o 1205 [facility]  i. Insurance/payment not mentioned on website  ii. [address], Kasie, Geovanny, [address]  iii. [phone]  iv. Air2Web    o [facility]  i. Insurance/payment not mentioned on website  ii. [address], James Montenegro  iii. [phone]  iv. "Modus Group, LLC."    o [facility]  i. Accepts many insurances  ii. 100 [facility], SharadMikeo 3  iii. [phone]  iv. www.Tanfield Direct Ltd.. Golden Reviews    o Life Made Conscious   i. Will provide you with a bill that you can submit to insurance to try to get reimbursed  ii. Likely will be out-of-network provider  iii. [address], Sharad Hensley, [address]  iv. [email or web address]. com    o [facility]  i. Appears to have a [facility] affiliation  ii. James ARORA 935, Sharad, 727 [facility]  iii.  Janie Collins location as well  iv. [phone]  v. ENTrigue Surgical. 9320 [facility] Accepts many private insurances and Medicare  ii. 271 [facility], [address] [place] [address]  iii. [phone] ext. [address] [facility] Weeks  i. Accepts many private insurances  ii. [address]  iii. 914 [facility] Accepts Medicare and other private insurances  ii. 66 Kelly Hancock, Avenkyree Nova 65, [address]  iii. [phone]  iv. http://www.manda.info/. en.html    o Shailesh Olivera  i. Accepts medicare and many private insurances  ii. [address], 1629 [facility] [address]  iii. [phone]    o NoInsuranceAgent.Bio-Key International/us/psychiatrists/oh/aracelisAnson Community Hospitalchang  i. Can utilize this website and filter by location and insurance  c. Medicare  o Danny Huh. Owens   i. Baptist Psychologist  ii. Private insurance and medicare  iii. [address]20 [facility], 103 [facility] [place] [facility]  iv. [phone]  v. [facility] [place] campus.es    o Compass Point  i. Accepts many insurances and also offers self-pay discounts  ii. Many different locations across [place] and [place]  iii. [phone]  iv. www.Castleview HospitalpointcoDirect DermatologyPreston Memorial Hospital. net    o [place] Automation  i. 100 [facility], [address]  ii. [phone]  iii. www.Ecofoot. Tsukulink    o [facility]  i. Appears to have a [place] affiliation  ii. James ARORA 935, [address]  iii. Janie Watsonorne location as well  iv. [phone]  v. ENTrigue Surgical. Delvin Garrison, Ph.D.  i. Silas Solano  ii. [address], 1629 E [facility] [address]  iii. [address] Accepts many private insurances and Medicare  ii. [address]  iii. [phone] ext. [address] [place] [address] Weeks  i. Accepts Medicare  ii.  [address], [facility]Unicoi [facility] [address]  iii. 22980 [facility] 151 Accepts Medicare   ii. [facility], 75 [facility]  iii. 1111 [facility] Accepts Medicare and other private insurances  ii. 66 Shelli Fletcher 65, [place], 7 [facility]  iii. [phone]  iv. http://www.manda.info/. en.html    o Shailehs Perciful  i. Accepts medicare and many private insurances  ii. [address], [address] [address]  iii. [phone]    d. Medicaid  o [facility] and [address] 1936 and [place] Advantage  ii. [address], [place], Mike [address] Paula 3  iii. [phone]@The Glassbox  v. [address] Accepts [place]  ii. [address], James Shin  iii. [phone] ext. 901 9[facility] Weeks  i. Accepts CareSource  ii. 601 [facility], 727 [facility]  iii. [phone]    o Gabriela Rossi  i. Accepts multiple Medicaid plans  ii. 3000 [address], [address], Luige Benedicto 10  iii. [phone]  iv. VoipAssistance.    o Vinay Duggan  i. Accepts multiple Medicaid plans  ii. 49963 [facility], 939 Critical access hospital, [address] [place] [address]  iii. [phone]    o Simone Lucia Psy.D.  i. Accepts multiple Medicaid plans  ii. [facility], Luige Benedicto 10  iii. 77 Kelly Sanchez Accepts multiple Medicaid plans  ii. 820 [facility], 6977 [facility], [address]  iii. 0839 [facility]  i. Most Medicaid plans  ii. J[address] 11, 1632 [facility]  iii. [phone]    e. PTSD therapy  o [facility]  i. Accept many private insurance plans  ii. Henry, [place] bra[place], and Maryanneer Communications  iii. [phone]  iv. www.G.I. Java    o Pearls of Wisdom Advanced Technologies  i. Accepts many insurances and also offers self-pay discounts  ii.  Many different locations across [address]  iii. [phone]  iv. www.Inf[facility] LT[facility] HospitalcoSt. [facility] Medical [facility]. net    o [facility]  i. Appears to have a ErikSSM Rehabi affiliation  ii. James F 935, [place], 7 [facility]  iii. Janie Dennis location as well  iv. [phone]  v. eWellness Corporation    o Spring Mobile Solutions and Jaquez Gerald. Accept many different insurances  ii. 4811 Amb[facility]Health, [facility] & HospitalJames Cris  iii. 1401 [facility] Nikolas Kovacs, Ph.D.  i. Accepts Medicare  ii. [address], [address], 1629 E [facility] [address]  iii. 5755 Eunice Accepts many private insurances and Medicare  ii. 271 [facility], [address] [place] [address], [facility] & HospitalJames Cris  iii. [phone] ext. 901 9Th [facility] Weeks  i. Accepts CareSource, Medicare, and many private insurances  ii. 601 [facility], T[place], [address] [place] [address]  iii. 100 Pin [facility] Shirlyn Snare   i. Accepts some private insurance, Placido Strongs, Tran, [place], [place]  ii. 820 [facility], [address] [place] [address], [address]  iii. [phone]    f. Long-term therapists for children  o Adult Child Family Counseling of Driver. 901 W [facility], [address], [facility] & HospitalJames Cris  ii. [phone]  iii. BridgeDigest.is    o [facility] (209 [facility] or Division of Developmental Disorders)  i. [phone]  ii. 4385 [facility], [place], 1100 [facility]  iii. Accepts most insurances including Medicaid  iv. May have a waiting list    o ThrivePointe  i. Mainly self-pay, but will at times offer services for those with financial limitations  ii. [address], Danica Hensley, Adriel. [address]  iii. 46 Kelly Jacobs, [place]   iv. [phone]  love [email or web address]. com  vi. News360. RentWiki    o [facility]  i. Accept many private insurance plans  ii. Henry, [place] bra[place], and Maryanneer Communications  iii. [phone]  iv. www.ShareGrove. RentWiki    o Compass Point  i. Accepts many insurances and also offers self-pay discounts  ii. Many different locations across [place] and [place]  iii. [phone]  iv. www.Correlor. MedCenterDisplay    o CS Products and Jaquez Gerald. Accept many different insurances  ii. 4811 [facility]  iii. [phone]    g. Couples Counseling    o [facility]   i. Appears to be mainly self-pay, but may call to see if your insurance is accepted  ii. 720 [facility], 2907 [facility]  iii. [phone]  iv. https://Borqs/    o Clearwater Automation  i. Accepts many insurances  ii. 100 [facility] CenterMike 3  iii. [phone]  iv. www.Symonics    o Life Made Conscious   i. Will provide you with a bill that you can submit to insurance to try to get reimbursed  ii. Likely will be out-of-network provider  iii. 53573 [facility], [facility], [address] [place] [address] Ave  iv. [email or web address]    o [facility]  i. Appears to have a Lilly Romo affiliation  ii. AzebWillis-Knighton [facility] 935[facility], [address] [place] [address]  iii. [facility] location as well  iv. [phone]  v. Telnic    o CS Products and [facility] for Womenson. Accept many different insurances  ii. 4811 [facility]  iii. [address] Accepts Medicare and self-pay  ii. [facility], 75 [facility] Road  iii. [address] Iveth Jones and Associates  i. Many different specialties within this topic  ii. Do not bill insurance, but can provide you with bills that you can submit to your insurance to try to obtain reimbursement   iii. [address], Sharad, C/ Pop Sal[address] 77  iv. [phone]    h. Eating Disorders    o Christian  i. [phone]  ii. 20050 [facility], [address] , [facility] [address]  iii. Accepts private insurances  iv.  Do not accept Medicaid or Medicare  v. Inpatient or intensive outpatient only    o Restoring R[facility]ins [facility] and [address] and [place] Advantage  ii. 4800 [facility], [address] [place] [address], [place] Lake[place] Do Rossyo 3  iii. [phone]  iv. www.restoringButler Hospitalec.com  v. [email or web address]    o [place]  i. Appears to have a [place]  affiliation  ii. Azebarnoldo F 935, [place], 727 [facility]  iii. [facility] HOSPITAL location as well  iv. [phone]  v. aka-aki networks    o SHAPE and Wonder Works Media. Accept many different insurances  ii. 4811 Ambassador [facility]  iii. [address] of [facility] Health Centermily Weiss[address]  ii. [phone]    i. Sexual Addictions    o Counseling [place]       i. Saiguo       ii. [phone]  iii. 2439 [facility], [address]  iv. 100-155 per session  v. Individual, couples, and group counseling  vi. Do not bill insurance, but can provide you with bills that you can submit to your insurance to try to obtain reimbursement     o [address]  i. Individual and group counseling  ii. Do not bill insurance, but can provide you with bills that you can submit to your insurance to try to obtain reimbursement   iii. 2000 [facility], [address]  iv. [phone]  v. HopesourcecounsJon Michael Moore Trauma Center. org  vi. [email or web address]. com    o Annie Lobo and Associates  i. Many different specialties within this topic  ii. Do not bill insurance, but can provide you with bills that you can submit to your insurance to try to obtain reimbursement   iii. [address], [place], C/ Pop Salter 77  iv. [phone]  j. OCD  o  Marie Winters Psy.D.  i.  Call regarding insurances accepted  ii. [address], [facility] 95   iii. [phone]  iv. CandChinese Whispers Musicash.co.za. com/  o  Jose G Danielle   i. Call regarding insurances accepted  ii. 115 [facility].  [address], Fi Burnard [facility], [address]  iii. [phone]  iv. [email or web address]. 134 Kelly Nguyen 176 [facility], 7500 [facility], 2900 [facility] [address]  ii. Accepts many private insurances and medicare  iii. [phone]  iv. www.[facility]. Edwin Parnell, Ph.D.  i. Insurances accepted not mentioned on website  ii. 66 Maninderamrik Karissa, 1829 [facility], [facility]0 [facility] [address]  iii. 6781 [facility] 100 Accepts many private insurances  ii. Ask for a provider who specializes in OCD  iii. 91 Lucile [facility] ChicagoJamesa  iv. [address] Accepts many private insurances and some medicaids  ii. 4800 [facility] Sharad Hinojosa [address]  iii. [phone]  iv. With [facility] and Coaching      4. Higher Levels of Care for 815 [facility]  i. Walk-ins accepting 24/7  ii. [phone]  iii. [address]  [facility], . [address]  iv. Accepts many private insurances  v. [place] Medicaid, CareSource, most Medicare    o Pathlight Mood and [address] Accepts private insurances  ii. [phone]  iii. SemiCheap.at    o [facility]   i. Partial Hospitalization Program or Intensive Outpatient Program  ii. [address] Se [facility], [address]  iii. BillingsMaps.at    o [facility] HOSP - Omega  i. Partial Hospitalization Program or Intensive Outpatient Program  ii. [address], [place], [facility]  iii. [phone]    5. Substance Use Treatment  a. Outpatient    o 45571 [facility]  i. Address: [address] [place] [address], 103 [facility]  ii. Phone: [phone]    o [address] [place] [address] [phone]   ii. Dee Dee Ad[address] 43   iii. SharadP[facility]lands [facility], [address] [place] [address] [phone]   ii. [address]   iii.  Sharad, [facility], [facility]    o Gateways  i. Address: 1206 E [facility]ere, [place], 101 E [facility]  ii. Phone: [phone]    o [facility]  i. Address: [address] Sharad [address], [address]  ii. Phone: [phone]    o [address]ey[address] 37. Accepts many insurances and also offers self-pay discounts  ii. Many different locations across [facility]  iii. [phone]  iv. www.PlayerPro. net    o Lendino and Jaquez Gerald. Accept many different insurances  ii. 4811 Ambassador [facility]  iii. [address]  i. Many different types of care  ii. 60 [facility]  iii. [phone]    b. Residential  o [facility]  i. Walk-ins accepting 24/7  ii. [phone]  iii. [address]  [facility] [address]  iv. Accepts many [address] Medicaid, CareSource, most Medicare    6. [address] by [facility] (psychiatry and therapy for those with Medicaid or who need case management)  a. [address] [facility], [place], 103 [facility]  ii. [phone]    [facility]  i. 9909 [facility], [place], [address] [place] [address]  ii. [phone]    b. 620 [facility]  i. Multiple Locations  ii. [phone]    c. 0896 [facility] Multiple Locations  ii. [phone]    7. Support phone line  o Located within [facility] individuals with mental health issues 24/7 on a non-emergency basis  ii. Peer support line  iii. Average length of call is from 10-20 minutes  iv.  Anyone can use this service  v. [phone]  vi. Jania.no

## 2022-03-04 LAB
C. TRACHOMATIS DNA ,URINE: NEGATIVE
N. GONORRHOEAE DNA, URINE: NEGATIVE

## 2022-03-13 DIAGNOSIS — E55.9 VITAMIN D DEFICIENCY: ICD-10-CM

## 2022-03-14 RX ORDER — CHOLECALCIFEROL (VITAMIN D3) 125 MCG
CAPSULE ORAL
Qty: 30 TABLET | Refills: 0 | Status: SHIPPED | OUTPATIENT
Start: 2022-03-14 | End: 2022-04-11

## 2022-03-23 ENCOUNTER — HOSPITAL ENCOUNTER (EMERGENCY)
Age: 32
Discharge: HOME | End: 2022-03-23
Attending: EMERGENCY MEDICINE
Payer: COMMERCIAL

## 2022-03-23 VITALS
BODY MASS INDEX: 45.29 KG/M2 | HEIGHT: 65 IN | RESPIRATION RATE: 14 BRPM | OXYGEN SATURATION: 100 % | SYSTOLIC BLOOD PRESSURE: 135 MMHG | HEART RATE: 83 BPM | WEIGHT: 271.8 LBS | TEMPERATURE: 98.9 F | DIASTOLIC BLOOD PRESSURE: 97 MMHG

## 2022-03-23 DIAGNOSIS — J45.901 MILD ASTHMA WITH EXACERBATION, UNSPECIFIED WHETHER PERSISTENT: Primary | ICD-10-CM

## 2022-03-23 DIAGNOSIS — Z72.0 TOBACCO ABUSE: ICD-10-CM

## 2022-03-23 PROCEDURE — 99284 EMERGENCY DEPT VISIT MOD MDM: CPT

## 2022-03-23 PROCEDURE — 6370000000 HC RX 637 (ALT 250 FOR IP): Performed by: EMERGENCY MEDICINE

## 2022-03-23 PROCEDURE — 6360000002 HC RX W HCPCS: Performed by: EMERGENCY MEDICINE

## 2022-03-23 PROCEDURE — 94640 AIRWAY INHALATION TREATMENT: CPT

## 2022-03-23 RX ORDER — PREDNISONE 20 MG/1
60 TABLET ORAL ONCE
Status: COMPLETED | OUTPATIENT
Start: 2022-03-23 | End: 2022-03-23

## 2022-03-23 RX ORDER — ALBUTEROL SULFATE 90 UG/1
2 AEROSOL, METERED RESPIRATORY (INHALATION) EVERY 6 HOURS PRN
Qty: 18 G | Refills: 1 | Status: SHIPPED | OUTPATIENT
Start: 2022-03-23 | End: 2022-06-29 | Stop reason: SDUPTHER

## 2022-03-23 RX ORDER — PREDNISONE 20 MG/1
20 TABLET ORAL 2 TIMES DAILY
Qty: 10 TABLET | Refills: 0 | Status: SHIPPED | OUTPATIENT
Start: 2022-03-23 | End: 2022-03-28

## 2022-03-23 RX ORDER — IPRATROPIUM BROMIDE AND ALBUTEROL SULFATE 2.5; .5 MG/3ML; MG/3ML
1 SOLUTION RESPIRATORY (INHALATION) ONCE
Status: COMPLETED | OUTPATIENT
Start: 2022-03-23 | End: 2022-03-23

## 2022-03-23 RX ORDER — ALBUTEROL SULFATE 2.5 MG/3ML
2.5 SOLUTION RESPIRATORY (INHALATION) ONCE
Status: COMPLETED | OUTPATIENT
Start: 2022-03-23 | End: 2022-03-23

## 2022-03-23 RX ADMIN — ALBUTEROL SULFATE 2.5 MG: 2.5 SOLUTION RESPIRATORY (INHALATION) at 10:43

## 2022-03-23 RX ADMIN — PREDNISONE 60 MG: 20 TABLET ORAL at 11:41

## 2022-03-23 RX ADMIN — IPRATROPIUM BROMIDE AND ALBUTEROL SULFATE 1 AMPULE: 2.5; .5 SOLUTION RESPIRATORY (INHALATION) at 10:43

## 2022-03-23 NOTE — ED PROVIDER NOTES
TRIAGE CHIEF COMPLAINT:   Chief Complaint   Patient presents with    Shortness of Breath       HPI: Alexia Cutler is a 36 y.o. female who presents to the emergency department with complaint of onset yesterday of cough productive of clear sputum, wheezing and shortness of breath. She has a history of asthma. She states her inhaler does not seem to be working very well. Denies fever or chills. She has no chest pain. Denies sore throat or ear pain. No abdominal pain vomiting or diarrhea. No UTI symptoms. Patient previously used Advair but states she no longer takes this. She has been using her inhaler more frequently. She is a smoker. REVIEW OF SYSTEMS:   10 systems reviewed. Pertinent positives per HPI. Otherwise noted to be negative. I have reviewed the triage/nursing documentation and agree unless otherwise noted below.     PAST MEDICAL HISTORY:   Past Medical History:   Diagnosis Date    Asthma     Chronic post-traumatic headache, not intractable 3/3/2021    COVID-19     12-4-2021    Diabetes mellitus ([facility] [address]     Dysmenorrhea     Essential hypertension 8/26/2021    Hyperlipidemia     Lactose intolerance     Menstrual irregularity     Obesity     Tobacco dependence         CURRENT MEDICATIONS:   Patient's Medications   New Prescriptions    No medications on file   Previous Medications    ALBUTEROL SULFATE  (90 BASE) MCG/ACT INHALER    TAKE 2 PUFFS BY MOUTH EVERY 6 HOURS AS NEEDED FOR WHEEZE    AMITRIPTYLINE (ELAVIL) 25 MG TABLET    TAKE 1 TABLET BY MOUTH EVERY DAY AT NIGHT    ASCORBIC ACID (VITAMIN C) 500 MG TABLET    Take 500 mg by mouth daily    BLOOD GLUCOSE MONITORING SUPPL (ONE TOUCH ULTRA 2) W/DEVICE KIT    1 kit by Does not apply route daily    BLOOD GLUCOSE TEST STRIPS (ONETOUCH ULTRA) STRIP    1 each by In Vitro route daily    CHOLECALCIFEROL (VITAMIN D3) 50 MCG (2000 UT) TABS    TAKE 1 TABLET BY MOUTH EVERY DAY    DULAGLUTIDE (TRULICITY) 9.58 QI/0.8ZZ SOPN    Inject 0.75 mg into the skin once a week    LANCET DEVICES (ONE TOUCH DELICA LANCING DEV) MISC    1 Device by Does not apply route daily    METFORMIN (GLUCOPHAGE-XR) 500 MG EXTENDED RELEASE TABLET    TAKE 1 TABLET BY MOUTH EVERY DAY AT NIGHT    NIFEDIPINE (ADALAT CC) 30 MG EXTENDED RELEASE TABLET    TAKE 1 TABLET BY MOUTH EVERY DAY    NYSTATIN (MYCOSTATIN) 340301 UNIT/GM POWDER    Apply topically 2 times daily    ONETOUCH DELICA LANCETS 69J MISC    1 each by Does not apply route daily    PROAIR  (90 BASE) MCG/ACT INHALER    Inhale 2 puffs into the lungs every 6 hours as needed for Wheezing   Modified Medications    No medications on file   Discontinued Medications    No medications on file        SURGICAL HISTORY:       Procedure Laterality Date    FRACTURE SURGERY      RIGHT ANKLE    HIP SURGERY Bilateral     for dislocation - ? 2006/2007    LAPAROSCOPY N/A 1/12/2022    DIAGNOSTIC LAPAROSCOPY, LYSIS OF ADHESIONS performed by Thelma Jefferson MD at [facility]:       Problem Relation Age of Onset    Cancer Father     Hypertension Father     Hypertension Maternal Aunt         SOCIAL HISTORY:    reports that she has been smoking cigarettes. She has a 0.50 pack-year smoking history. She has never used smokeless tobacco. She reports current alcohol use of about 0.8 standard drinks of alcohol per week. She reports that she does not use drugs. ALLERGIES:   Allergies   Allergen Reactions    Codeine Anaphylaxis, Swelling and Other (See Comments)     Throat swelling.  Lactose Nausea And Vomiting       PHYSICAL EXAM:  VITAL SIGNS: BP (!) 135/97   Pulse 83   Temp 98.9 °F (37.2 °C) (Oral)   Resp 14   Ht 5' 5\" (1.651 m)   Wt 271 lb 12.8 oz (123.3 kg)   SpO2 98%   BMI 45.23 kg/m²   Constitutional:  No acute distress, Non-toxic appearance  HENT: Normocephalic, Atraumatic Oropharynx moist, No oral exudates. TMs are normal.  Eyes:  PERRL, EOMI, Conjunctiva normal, No discharge.   Neck: No tenderness, Supple, No lymphadenopathy, No stridor. Cardiovascular:  Normal heart rate, Normal rhythm, No murmurs, No rubs, No gallops. Pulmonary/Chest: Mild bilateral expiratory wheezes. No rales. No respiratory distress. No cough. Abdomen:   Soft, No tenderness, No masses, No pulsatile masses  Back:  No tenderness, No CVA tenderness  Extremities:  Normal range of motion, Intact distal pulses, No edema, No tenderness  Neurologic:  Alert & oriented x 3, Speech is clear and appropriate, No upper extremity drift or lower extremity weakness,  Normal sensory function, No facial asymmetry, no truncal or extremity ataxia. Normal gait. Skin:  Warm, Dry, No erythema, No rash  Psychiatric:  Affect normal, Mood normal      EKG:    EKG interpreted by myself. Radiology:      LAB      ED COURSE & MEDICAL DECISION MAKING:  Pertinent Labs & Imaging studies reviewed. (See chart for details)  68-year-old female with history of asthma, history of smoking with onset yesterday of cough productive of clear sputum, wheezing and some shortness of breath. She has been using her albuterol inhaler more frequently than normal.  No fever or chills. No chest pain. No sore throat or ear pain. Room air O2 sat is 98%. Heart rate is normal.  She is afebrile. Mild bilateral expiratory wheezes noted. She was given a dose of prednisone as well as a DuoNeb/albuterol treatment. On reexam the patient stated she felt better. Lungs were clear. She denied shortness of breath. Room air O2 sat was normal.  I recommended she stop smoking. She was given a refill of her inhaler and a short course of prednisone. She was also given a prescription for Advair. Recommended follow-up with her primary care doctor and return here for worse symptoms. I discussed with Alexia Cutler the results of the evaluation in the Emergency Department, diagnosis, care, prognosis and the importance of follow-up. The patient is stable for discharge.  The patient and/or family are in agreement with the plan and all questions have been answered. Specific discharge instructions were explained, including reasons to return to the emergency department.         (Please note that portions of this note may have been completed with a voice recognition program.  Efforts were made to edit the dictation but occasionally words are mis-transcribed)      FINAL IMPRESSION:  1 --asthma exacerbation  2 --tobacco abuse                Francisco Erwin MD  03/23/22 1085

## 2022-03-23 NOTE — ED NOTES
Patient given prescription,  discharge instructions verbal and written, patient verbalized understanding. Alert/oriented X4, Clear speech.   Patient exhibits no distress, ambulates with steady gait per self leaving unit, no further request.     Elijah Hobbs RN  03/23/22 3027

## 2022-03-23 NOTE — ED TRIAGE NOTES
Patient to ed with complaints of sob and cough which started yesterday, patient has asthma and reports her inhalers have not helped.

## 2022-03-24 ENCOUNTER — CARE COORDINATION (OUTPATIENT)
Dept: CARE COORDINATION | Age: 32
End: 2022-03-24

## 2022-03-24 NOTE — PATIENT INSTRUCTIONS
Patient Education        fluticasone and salmeterol  Pronunciation:  floo TIK a sone, sal ME te rol  Brand:  Advair Diskus, Advair HFA, AirDuo RespiClick  What is the most important information I should know about fluticasone and salmeterol? Fluticasone and salmeterol is not a rescue medicine. It will not work fast enough to treat an asthma or bronchospasm attack. Seek medical attention you have worsening breathing problems, or if you think your medications are not working as well. What is fluticasone and salmeterol inhalation? Fluticasone and salmeterol inhalation is a steroid and bronchodilator combination medicine that is used to prevent asthma attacks. It is also used to prevent flare-ups or worsening of chronic obstructive pulmonary disease (COPD) associated with chronic bronchitis and/or emphysema. In people with COPD, fluticasone and salmeterol is for long-term treatment. In people with asthma, this medicine is for short-term treatment until symptoms are well controlled with with other medicines. Advair Diskus is for use in adults and children who are at least 3years old. Advair HFA and Jerrica Lizzie are for use in adults and children who are at least 15years old. Fluticasone and salmeterol may also be used for purposes not listed in this medication guide. What should I discuss with my healthcare provider before using fluticasone and salmeterol? You should not use this medicine if you are allergic to fluticasone or salmeterol, or:  · if you have a severe allergy to milk proteins; or  · if you are having an asthma attack or severe COPD symptoms. Fluticasone can weaken your immune system, making it easier for you to get an infection or worsening an infection you already have or have recently had. Tell your doctor about any illness or infection you have had within the past several weeks.   Tell your doctor if you have ever had:  · glaucoma or cataracts;  · heart disease or high blood pressure;  · a seizure;  · diabetes;  · a food or drug allergy;  · a weak immune system;  · any type of infection (bacterial, fungal, viral, or parasitic);  · osteoporosis;  · a thyroid disorder; or  · liver or kidney disease. Long-term use of steroids may lead to bone loss (osteoporosis), especially if you smoke, if you do not exercise, if you do not get enough vitamin D or calcium in your diet, or if you have a family history of osteoporosis. Talk with your doctor about your risk. Tell your doctor if you are pregnant. It is not known whether this medicine will harm an unborn baby. However, having untreated or uncontrolled asthma during pregnancy may cause complications such as low birth weight, premature birth, or eclampsia (dangerously high blood pressure that can lead to medical problems in both mother and baby). The benefit of treating asthma may outweigh any risks to the baby. It may not be safe to breast-feed while using this medicine. Ask your doctor about any risk. Do not give this medicine to a child without medical advice. How should I use fluticasone and salmeterol? Follow all directions on your prescription label and read all medication guides or instruction sheets. Use the medicine exactly as directed. Using too much of this medicine can cause life-threatening side effects. Fluticasone and salmeterol is not a rescue medicine for asthma or bronchospasm attacks. Use only fast-acting inhalation medicine for an attack. Seek medical attention if your breathing problems get worse quickly, or if you think your asthma medications are not working as well. Advair Diskus is a powder form of fluticasone and salmeterol that comes with a special inhaler device pre-loaded with blister packs containing measured doses of the medicine. Advair HFA and AirDuo Respiclick each come in a canister that is used with an actuator inhaler device. Use the medicine at the same time each day.  Use only the inhaler device provided with your medication. Shake the Advair HFA inhaler for at least 5 seconds before each spray. Do not allow a young child to use this medicine without help from an adult. Read and carefully follow any Instructions for Use provided with your medicine. Ask your doctor or pharmacist if you do not understand these instructions. Rinse your mouth with water without swallowing after each use of your inhaler. Your dose needs may change due to surgery, illness, stress, or a recent asthma attack. Do not change your medication dose or schedule without your doctor's advice. If you also use an oral steroid medication, you should not stop using it suddenly. Follow your doctor's instructions about tapering your dose. If you use a peak flow meter at home, tell your doctor if your numbers are lower than normal.  Your vision and your bone mineral density may need to be checked often. Store your medicine at room temperature, away from moisture, heat, and sunlight. Avoid high heat, such as open flame or in a car on a hot day. Carefully follow all storage and disposal directions provided with your medicine. Once your asthma is under control, your doctor may want you to stop using this medicine. Do not stop using the medicine unless your doctor tells you to. What happens if I miss a dose? Skip the missed dose and use your next dose at the regular time. Do not use two doses at one time. What happens if I overdose? Seek emergency medical attention or call the Poison Help line at 1-643.576.3367. Overdose symptoms may include chest pain, fast heart rate, and feeling shaky or short of breath. Long term use of an inhaled steroid can lead to glaucoma, cataracts, thinning skin, changes in body fat (especially in your face, neck, back, and waist), increased acne or facial hair, menstrual problems, impotence, or loss of interest in sex. What should I avoid while using fluticasone and salmeterol?   Avoid being near people who are sick or have infections. Call your doctor for preventive treatment if you are exposed to chickenpox or measles. These conditions can be serious or even fatal in people who are using a steroid such as fluticasone. Do not use a second inhaled bronchodilator unless your doctor tells you to. This includes formoterol (Perforomist, Symbicort, Bevespi, Dulera), arformoterol (Brovana), indacaterol (Arcapta), olodaterol (Striverdi, Stiolto Respimat), salmeterol (Serevent), or vilanterol (Anoro Ellipta, Breo Ellipta, Trelegy Ellipta). What are the possible side effects of fluticasone and salmeterol? Get emergency medical help if you have signs of an allergic reaction: hives; difficulty breathing; swelling of your face, lips, tongue, or throat. Call your doctor at once if you have:  · wheezing, choking, or other breathing problems after using this medicine;  · fever, chills, cough with mucus, feeling short of breath;  · chest pain, fast or irregular heartbeats, severe headache, pounding in your neck or ears;  · tremors, nervousness;  · blurred vision, tunnel vision, eye pain, or seeing halos around lights;  · signs of thrush (a fungal infection) --sores or white patches in your mouth or throat, trouble swallowing;  · high blood sugar --increased thirst, increased urination, dry mouth, fruity breath odor;  · low potassium level --leg cramps, constipation, irregular heartbeats, fluttering in your chest, increased thirst or urination, numbness or tingling, muscle weakness or limp feeling; or  · signs of a hormonal disorder --worsening tiredness or weakness, feeling light-headed, nausea, vomiting. Fluticasone can affect growth in children. Talk with your doctor if you think your child is not growing at a normal rate while using this medication.   Common side effects may include:  · headache, muscle pain, bone pain, back pain;  · nausea, vomiting;  · thrush, throat irritation;  · ongoing cough, hoarseness or deepened voice;  · cold symptoms such as stuffy nose, sneezing, sore throat; or  · ear infection (in a child) --fever, ear pain or full feeling, trouble hearing, drainage from the ear, fussiness. This is not a complete list of side effects and others may occur. Call your doctor for medical advice about side effects. You may report side effects to FDA at 3-548-NXZ-6629. What other drugs will affect fluticasone and salmeterol? Sometimes it is not safe to use certain medications at the same time. Some drugs can affect your blood levels of other drugs you take, which may increase side effects or make the medications less effective. Tell your doctor about all your other medicines, especially:  · antifungal medicine; or  · medicine to treat HIV or AIDS. This list is not complete and many other drugs may affect fluticasone and salmeterol. This includes prescription and over-the-counter medicines, vitamins, and herbal products. Not all possible drug interactions are listed here. Where can I get more information? Your pharmacist can provide more information about fluticasone and salmeterol inhalation. Remember, keep this and all other medicines out of the reach of children, never share your medicines with others, and use this medication only for the indication prescribed. Every effort has been made to ensure that the information provided by [address] is accurate, up-to-date, and complete, but no guarantee is made to that effect. Drug information contained herein may be time sensitive. [facility] information has been compiled for use by healthcare practitioners and consumers in the [place] and therefore [facility] does not warrant that uses outside of the [place] are appropriate, unless specifically indicated otherwise. [facility]'s drug information does not endorse drugs, diagnose patients or recommend therapy.  [facility]'s drug information is an informational resource designed to assist licensed healthcare practitioners in caring for their patients and/or to serve consumers viewing this service as a supplement to, and not a substitute for, the expertise, skill, knowledge and judgment of healthcare practitioners. The absence of a warning for a given drug or drug combination in no way should be construed to indicate that the drug or drug combination is safe, effective or appropriate for any given patient. [facility] does not assume any responsibility for any aspect of healthcare administered with the aid of information [facility] provides. The information contained herein is not intended to cover all possible uses, directions, precautions, warnings, drug interactions, allergic reactions, or adverse effects. If you have questions about the drugs you are taking, check with your doctor, nurse or pharmacist.  Copyright [id number] [address] [place] [address] Dr FORBES. Version: 17.02. Revision date: 12/30/2019. Care instructions adapted under license by [facility] ([facility]). If you have questions about a medical condition or this instruction, always ask your healthcare professional. [place] [address] any warranty or liability for your use of this information.

## 2022-03-24 NOTE — CARE COORDINATION
David 45 Transitions Initial Follow Up Call     Call to patient, feels this is a seasonal thing, gets it once a year  Will start taking Claritin as she usually does along w Advair bid and Alb as needed  Reports FBG was 170   Has been under a lot of stress   reports she hasnt changed her diet, exercise regimen  Declined need for ACM at this time    Call within 2 business days of discharge: Yes     Patient: Jocy Iraheta Patient : 1990 MRN: [id number]    Last Discharge [facility]       Complaint Diagnosis Description Type Department Provider    3/23/22 Shortness of Breath Mild asthma with exacerbation, unspecified whether persistent . ..  ED (DISCHARGE) [facility] ED Kelsi Beatty MD          RARS: No data recorded     Spoke with: Patient    Discharge department/facility: [facility]    Non-face-to-face services provided:  Obtained and reviewed discharge summary and/or continuity of care documents  Reviewed and followed up on pending diagnostic tests and treatments-reviewed   Education of patient/family/caregiver/guardian to support self-management-medication compliance , how to take,     Follow Up  Future Appointments   Date Time Provider Whitney Andre   2022  1:00 PM Yoseph Rocha  [facility] [address] - DYD   2022  3:40 PM Mirtha Parish MD [address]       Claudell Brock, RN

## 2022-04-08 DIAGNOSIS — E55.9 VITAMIN D DEFICIENCY: ICD-10-CM

## 2022-04-10 DIAGNOSIS — E11.65 UNCONTROLLED TYPE 2 DIABETES MELLITUS WITH HYPERGLYCEMIA (HCC): ICD-10-CM

## 2022-04-11 RX ORDER — SITAGLIPTIN 100 MG/1
TABLET, FILM COATED ORAL
Qty: 30 TABLET | Refills: 5 | Status: SHIPPED | OUTPATIENT
Start: 2022-04-11 | End: 2022-04-26 | Stop reason: ALTCHOICE

## 2022-04-11 RX ORDER — CHOLECALCIFEROL (VITAMIN D3) 50 MCG
TABLET ORAL
Qty: 30 TABLET | Refills: 11 | Status: SHIPPED | OUTPATIENT
Start: 2022-04-11

## 2022-04-11 NOTE — TELEPHONE ENCOUNTER
Last appointment: 3/3/2022  Next appointment: 4/14/2022  Last refill: The original prescription was discontinued on 3/3/2022 by Priscila Grant MD for the following reason: Therapy completed.  Renewing this prescription may not be appropriate

## 2022-04-26 ENCOUNTER — OFFICE VISIT (OUTPATIENT)
Dept: INTERNAL MEDICINE CLINIC | Age: 32
End: 2022-04-26
Payer: COMMERCIAL

## 2022-04-26 VITALS
RESPIRATION RATE: 14 BRPM | OXYGEN SATURATION: 98 % | DIASTOLIC BLOOD PRESSURE: 84 MMHG | HEART RATE: 89 BPM | SYSTOLIC BLOOD PRESSURE: 116 MMHG | BODY MASS INDEX: 44.86 KG/M2 | WEIGHT: 269.6 LBS

## 2022-04-26 DIAGNOSIS — M25.552 ACUTE HIP PAIN, LEFT: ICD-10-CM

## 2022-04-26 DIAGNOSIS — E11.65 UNCONTROLLED TYPE 2 DIABETES MELLITUS WITH HYPERGLYCEMIA (HCC): Primary | ICD-10-CM

## 2022-04-26 DIAGNOSIS — J34.89 NASAL VESTIBULITIS: ICD-10-CM

## 2022-04-26 LAB
CHP ED QC CHECK: NORMAL
GLUCOSE BLD-MCNC: 187 MG/DL

## 2022-04-26 PROCEDURE — 99214 OFFICE O/P EST MOD 30 MIN: CPT | Performed by: INTERNAL MEDICINE

## 2022-04-26 PROCEDURE — 2022F DILAT RTA XM EVC RTNOPTHY: CPT | Performed by: INTERNAL MEDICINE

## 2022-04-26 PROCEDURE — 3046F HEMOGLOBIN A1C LEVEL >9.0%: CPT | Performed by: INTERNAL MEDICINE

## 2022-04-26 PROCEDURE — G8427 DOCREV CUR MEDS BY ELIG CLIN: HCPCS | Performed by: INTERNAL MEDICINE

## 2022-04-26 PROCEDURE — 82962 GLUCOSE BLOOD TEST: CPT | Performed by: INTERNAL MEDICINE

## 2022-04-26 PROCEDURE — 4004F PT TOBACCO SCREEN RCVD TLK: CPT | Performed by: INTERNAL MEDICINE

## 2022-04-26 PROCEDURE — G8417 CALC BMI ABV UP PARAM F/U: HCPCS | Performed by: INTERNAL MEDICINE

## 2022-04-26 RX ORDER — ECHINACEA PURPUREA EXTRACT 125 MG
1 TABLET ORAL
Qty: 88 EACH | Refills: 5 | Status: SHIPPED | OUTPATIENT
Start: 2022-04-26 | End: 2022-09-12 | Stop reason: ALTCHOICE

## 2022-04-26 ASSESSMENT — ENCOUNTER SYMPTOMS
DIARRHEA: 1
WHEEZING: 0
NAUSEA: 0
SHORTNESS OF BREATH: 0
ABDOMINAL PAIN: 0
VOMITING: 0
COUGH: 0

## 2022-04-26 NOTE — PROGRESS NOTES
Gera Grant (:  1990) is a 36 y.o. female,Established patient, here for evaluation of the following chief complaint(s):  Diabetes and Hip Pain (left hip pain, feels like she pulled a muscle, felt a pop, sharp shooting pain. x2 days )      ASSESSMENT/PLAN:  1. Uncontrolled type 2 diabetes mellitus with hyperglycemia (HCC)  Assessment & Plan:  Continue trulicity at current dose. Given mild side effects do not want to increase dose. BG have been improved. Continue metformin 500mg daily. Orders:  -     POCT Glucose  2. Acute hip pain, left  Comments:  Given severity of pain and mechanism of injury will have her see ortho. NSAIDs in the meantime. Orders:  -     Christin Haines MD, Orthopaedics and Sports Medicine, [facility]  3. Nasal vestibulitis  Comments:  Mupirocin and nasal saline mist.   Orders:  -     mupirocin (BACTROBAN) 2 % ointment; Apply topically 3 times daily. , Disp-30 g, R-0, Normal  -     sodium chloride (ALTAMIST SPRAY) 0.65 % nasal spray; 1 spray by Nasal route every 2 hours as needed for Congestion, Disp-88 each, R-5Normal      Return in about 6 weeks (around 2022) for DM2, + A1C. SUBJECTIVE/OBJECTIVE:  HPI    BG have been 120-180 at home fasting. Trulicity going fine. It is giving her some diarrhea but better than metformin. She is on 500mg of metformin daily. 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 Difficulty with range of motion and pain since. Was severe yesterday. ROM is low. Review of Systems   Constitutional: Negative for chills, fatigue and fever. Respiratory: Negative for cough, shortness of breath and wheezing. Cardiovascular: Negative for chest pain, palpitations and leg swelling. Gastrointestinal: Positive for diarrhea. Negative for abdominal pain, nausea and vomiting. Musculoskeletal: Positive for arthralgias and gait problem.        Current Outpatient Medications on File Prior to Visit   Medication Sig Dispense Refill    Loratadine-Pseudoephedrine (CLARITIN-D 24 HOUR PO) Take by mouth      vitamin D (CHOLECALCIFEROL) 50 MCG (2000 UT) TABS tablet TAKE 1 TABLET BY MOUTH EVERY DAY 30 tablet 11    albuterol sulfate HFA (PROAIR HFA) 108 (90 Base) MCG/ACT inhaler Inhale 2 puffs into the lungs every 6 hours as needed for Wheezing or Shortness of Breath Dispense with SPACER and Instruct on use 18 g 1    fluticasone-salmeterol (ADVAIR DISKUS) 250-50 MCG/DOSE AEPB Inhale 1 puff into the lungs every 12 hours 60 each 1    Dulaglutide (TRULICITY) 0.73 VJ/8.3FB SOPN Inject 0.75 mg into the skin once a week 4 pen 2    NIFEdipine (ADALAT CC) 30 MG extended release tablet TAKE 1 TABLET BY MOUTH EVERY DAY 90 tablet 1    metFORMIN (GLUCOPHAGE-XR) 500 MG extended release tablet TAKE 1 TABLET BY MOUTH EVERY DAY AT NIGHT 90 tablet 1    OneTouch Delica Lancets 10T MISC 1 each by Does not apply route daily 100 each 11    Lancet Devices (ONE TOUCH DELICA LANCING DEV) MISC 1 Device by Does not apply route daily 1 each 0    Blood Glucose Monitoring Suppl (ONE TOUCH ULTRA 2) w/Device KIT 1 kit by Does not apply route daily 1 kit 0    blood glucose test strips (ONETOUCH ULTRA) strip 1 each by In Vitro route daily 100 each 11    [DISCONTINUED] amitriptyline (ELAVIL) 25 MG tablet TAKE 1 TABLET BY MOUTH EVERY DAY AT NIGHT 90 tablet 1    [DISCONTINUED] vitamin D (ERGOCALCIFEROL) 1.25 MG (32201 UT) CAPS capsule TAKE 1 CAPSULE BY MOUTH ONE TIME PER WEEK 4 capsule 1     No current facility-administered medications on file prior to visit. Vitals:    04/26/22 1627   BP: 116/84   Pulse: 89   Resp: 14   SpO2: 98%     Physical Exam  Constitutional:       General: She is not in acute distress. Appearance: Normal appearance. She is not diaphoretic. HENT:      Head: Normocephalic and atraumatic.       Right Ear: External ear normal.      Left Ear: External ear normal.      Nose:      Comments: Mild ulceration of nasal vestibule     Mouth/Throat:      Mouth: Mucous membranes are moist.      Pharynx: Oropharynx is clear. Eyes:      General: No scleral icterus. Conjunctiva/sclera: Conjunctivae normal.   Cardiovascular:      Rate and Rhythm: Normal rate and regular rhythm. Pulses: Normal pulses. Heart sounds: Normal heart sounds. No murmur heard. No friction rub. No gallop. Pulmonary:      Effort: Pulmonary effort is normal.      Breath sounds: Normal breath sounds. No wheezing, rhonchi or rales. Abdominal:      General: Abdomen is flat. Bowel sounds are normal.   Musculoskeletal:      Cervical back: Normal range of motion. Right lower leg: No edema. Left lower leg: No edema. Comments: Left hip with decreased ROM in all planes. Painful. Skin:     General: Skin is warm and dry. Findings: No rash. Neurological:      General: No focal deficit present. Mental Status: She is alert. Coordination: Coordination normal.      Gait: Gait normal.   Psychiatric:         Mood and Affect: Mood normal.         Behavior: Behavior normal.           On this date 4/26/2022 I have spent 30 minutes reviewing previous notes, test results and face to face with the patient discussing the diagnosis and importance of compliance with the treatment plan as well as documenting on the day of the visit. An electronic signature was used to authenticate this note.     --Don Salinas MD

## 2022-04-26 NOTE — ASSESSMENT & PLAN NOTE
Continue trulicity at current dose. Given mild side effects do not want to increase dose. BG have been improved. Continue metformin 500mg daily.

## 2022-05-17 RX ORDER — TRIAMCINOLONE ACETONIDE 5 MG/G
CREAM TOPICAL
Qty: 30 G | Refills: 2 | Status: SHIPPED | OUTPATIENT
Start: 2022-05-17 | End: 2022-08-21

## 2022-05-19 ENCOUNTER — OFFICE VISIT (OUTPATIENT)
Dept: ORTHOPEDIC SURGERY | Age: 32
End: 2022-05-19
Payer: COMMERCIAL

## 2022-05-19 VITALS — HEIGHT: 65 IN | BODY MASS INDEX: 44.82 KG/M2 | WEIGHT: 269 LBS

## 2022-05-19 DIAGNOSIS — M25.552 CHRONIC PAIN OF BOTH HIPS: ICD-10-CM

## 2022-05-19 DIAGNOSIS — M25.851 FEMOROACETABULAR IMPINGEMENT OF BOTH HIPS: Primary | ICD-10-CM

## 2022-05-19 DIAGNOSIS — M25.551 CHRONIC PAIN OF BOTH HIPS: ICD-10-CM

## 2022-05-19 DIAGNOSIS — M25.852 FEMOROACETABULAR IMPINGEMENT OF BOTH HIPS: Primary | ICD-10-CM

## 2022-05-19 DIAGNOSIS — Z87.39 H/O SLIPPED CAPITAL FEMORAL EPIPHYSIS (SCFE): ICD-10-CM

## 2022-05-19 DIAGNOSIS — G89.29 CHRONIC PAIN OF BOTH HIPS: ICD-10-CM

## 2022-05-19 PROCEDURE — G8427 DOCREV CUR MEDS BY ELIG CLIN: HCPCS | Performed by: ORTHOPAEDIC SURGERY

## 2022-05-19 PROCEDURE — 4004F PT TOBACCO SCREEN RCVD TLK: CPT | Performed by: ORTHOPAEDIC SURGERY

## 2022-05-19 PROCEDURE — G8417 CALC BMI ABV UP PARAM F/U: HCPCS | Performed by: ORTHOPAEDIC SURGERY

## 2022-05-19 PROCEDURE — 99204 OFFICE O/P NEW MOD 45 MIN: CPT | Performed by: ORTHOPAEDIC SURGERY

## 2022-05-19 NOTE — LETTER
[facility]  [address] [facility] [address]  Phone: [phone]  Fax: [phone]    Mariella Sweet MD    May 19, 2022     Tigre Galeas, [address] [place], [place] [address]    Patient: Angel Davis   MR Number: [id number]   YOB: 1990   Date of Visit: 5/19/2022       Dear Tigre Galeas: Thank you for referring Marcell Garza to me for evaluation/treatment. Below are the relevant portions of my assessment and plan of care. If you have questions, please do not hesitate to call me. I look forward to following Atul along with you.     Sincerely,      Mariella Sweet MD
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
## 2022-05-19 NOTE — LETTER
Physical Therapy Rehabilitation Referral    Patient Name: Lotus Garcia MRN: [id number]  DOS: 5/19/2022     Diagnosis:   1. Femoroacetabular impingement of both hips    2.  Hip pain            Precautions:     [x] Evaluate and Treat    Post Op Instructions:  [] 20 lb flat foot weight bearing x 3 weeks with crutches  [] 20 lb flat foot weight bearing x 4 weeks with crutches (cartilage repair protocol)  [] Weight bearing as tolerated x 2 weeks with crutches  [] No active abduction x 6 weeks (abductor repair protocol)  [] Continuous passive motion (CPM)   [] AAROM: Flexion to 90   [] Uni-planar passive range of motion   [] AAROM: External rotation to 10   [] Hip brace on at all times    [] AAROM: Extension to 10   [] Hip brace when hip at risk     [] AAROM:  Neutral IR   [] Home exercise program (copy to patient)   [] AAROM: Abduction to 25    [] Isometric external rotator strengthening    [] Isometric glute max strengthening     [] Isometric abductor strengthening     [] Leg Circumduction (PT and family member assisted x 3 weeks)                 Post-op Phases:  []Phase I: Maximum Protection (Day 1-3 Weeks)  []Phase II: Mobility & Neuromuscular Retraining (3-6 Weeks)  []Phase III: Phase III: Muscle Balance and Strengthening (6-12 Weeks)  []Phase IV: Functional Training of the Hip & Lower Extremity (12-18 Weeks)  []Phase V: Advanced Training  Specificity for Return to Sport and/or Work (18-24 [employer])    Non-Operative & Pre-Operative Rehabilitation:    Cardiovascular:            Deep Hip Rotators  [x] Upright Bike (Baseline)           [x] External Rotators AROM in 4PK (Baseline)  [x] Walking (Progression 1)           [x] Internal Rotators AROM in 4PK (Baseline)  [] Running (Progression 2)           [x] ER in side lying (Progression 1)  [] Dynamic Loading (Progression 3)          [x] IR in side lying (Progression 1)                [] ER with resistance in 4PK (Progression 2)                [] IR with resistance in 4PK (Progression 2)                 [] Lateral Step Up (Progression 3)    Positioning:  [x] 4PK with pelvic tilts (Baseline)  [x] Pelvic tilts in sitting (Progression 1)      Core:   [x] Relaxation Breathing (Baseline)         [x] [place] lying elbow presses (Progression 1)  [x] Side Planks (Baseline)         [x] Side planks + hip abduction (Progression 1)  [x] Bird-Dog (Baseline)            [x] Bird-Dog with resistance (Progression 1)    Glute Complex:            Load Transfer:  [x] Bridging (Baseline)                  [x] Weight Shifting (Baseline)     [x] Single Leg Bridge (Progression 1)        [x] Single Leg Stance to SEBT(Progression 1)     [] Single Leg Lower (Progression 2)         [] Hip hinge + monster walks (Progression 2)       Mobility:  [x] Pineda position with breathing (baseline)  [x] Hip Hinge with stick & neutral spine (baseline)  [x] Sit to stand (baseline)  [x] Hip Hinge without guidance (Progression 1)  [] Hip Hinge with resisted punch out guidance (Progression 2)      Pelvic Floor:  [] Relaxation breathing         Activities:       [] Rowing       [] Stepper/Exercise bike     [] Swimming  [] Water exercises    Modalities:     Return to Sport:  [x] Of Choice      [] Plyometrics  [] Ultrasound     [] Rhythmic stabilization  [] Iontophoresis    [] Core strengthening   [] Moist heat     [] Sports specific program:   [] Massage         [x] Cryotherapy      [] Electrical stimulation     [] Paraffin  [] Whirlpool  [] TENS    [x] Home exercise program (copy to patient).         Perform exercises for:   15     minutes    3      times/day  [x] Supervised physical therapy  Frequency: []  1x week  [x] 2x week  [] 3x week  [] Other:   Duration: [] 2 weeks   [] 4 weeks  [x] 6 weeks  [] Other:     Additional Instructions:           Sincerely,    Lulú Randhawa  [address] [facility]   [address], [address], [address]  Email: [email or web address]  Office: [phone]    05/19/22  10:42 AM

## 2022-05-31 ENCOUNTER — TELEPHONE (OUTPATIENT)
Dept: INTERNAL MEDICINE CLINIC | Age: 32
End: 2022-05-31

## 2022-05-31 ENCOUNTER — HOSPITAL ENCOUNTER (OUTPATIENT)
Dept: GENERAL RADIOLOGY | Age: 32
Discharge: HOME | End: 2022-05-31
Payer: COMMERCIAL

## 2022-05-31 DIAGNOSIS — M25.852 FEMOROACETABULAR IMPINGEMENT OF BOTH HIPS: ICD-10-CM

## 2022-05-31 DIAGNOSIS — M25.851 FEMOROACETABULAR IMPINGEMENT OF BOTH HIPS: ICD-10-CM

## 2022-05-31 PROCEDURE — 6360000004 HC RX CONTRAST MEDICATION: Performed by: RADIOLOGY

## 2022-05-31 PROCEDURE — 6360000002 HC RX W HCPCS: Performed by: RADIOLOGY

## 2022-05-31 PROCEDURE — 2500000003 HC RX 250 WO HCPCS: Performed by: RADIOLOGY

## 2022-05-31 PROCEDURE — 77002 NEEDLE LOCALIZATION BY XRAY: CPT

## 2022-05-31 PROCEDURE — 20610 DRAIN/INJ JOINT/BURSA W/O US: CPT

## 2022-05-31 RX ORDER — LIDOCAINE HYDROCHLORIDE 10 MG/ML
20 INJECTION, SOLUTION INFILTRATION; PERINEURAL ONCE
Status: DISCONTINUED | OUTPATIENT
Start: 2022-05-31 | End: 2022-05-31 | Stop reason: ALTCHOICE

## 2022-05-31 RX ORDER — TRIAMCINOLONE ACETONIDE 40 MG/ML
80 INJECTION, SUSPENSION INTRA-ARTICULAR; INTRAMUSCULAR ONCE
Status: DISCONTINUED | OUTPATIENT
Start: 2022-05-31 | End: 2022-05-31 | Stop reason: ALTCHOICE

## 2022-05-31 RX ORDER — BUPIVACAINE HYDROCHLORIDE 2.5 MG/ML
15 INJECTION, SOLUTION EPIDURAL; INFILTRATION; INTRACAUDAL ONCE
Status: DISCONTINUED | OUTPATIENT
Start: 2022-05-31 | End: 2022-05-31 | Stop reason: ALTCHOICE

## 2022-05-31 RX ADMIN — TRIAMCINOLONE ACETONIDE 80 MG: 40 INJECTION, SUSPENSION INTRA-ARTICULAR; INTRAMUSCULAR at 09:10

## 2022-05-31 RX ADMIN — LIDOCAINE HYDROCHLORIDE 20 ML: 10 INJECTION, SOLUTION INFILTRATION; PERINEURAL at 09:10

## 2022-05-31 RX ADMIN — BUPIVACAINE HYDROCHLORIDE 37.5 MG: 2.5 INJECTION, SOLUTION EPIDURAL; INFILTRATION; INTRACAUDAL at 09:09

## 2022-05-31 RX ADMIN — IOPAMIDOL 10 ML: 612 INJECTION, SOLUTION INTRAVENOUS at 09:11

## 2022-05-31 ASSESSMENT — PAIN SCALES - GENERAL
PAINLEVEL_OUTOF10: 5
PAINLEVEL_OUTOF10: 9

## 2022-05-31 NOTE — TELEPHONE ENCOUNTER
Patient is requesting to speak with Niranjan Roca. She states she got the injection for her hip this morning and was told to give it a day to help. She states she is in so much pain, she can barely walk, and is on the phone crying. Se rates her pain a 10 on a scale of 1-10. Please contact patient @ phone # provided.

## 2022-06-02 ENCOUNTER — HOSPITAL ENCOUNTER (OUTPATIENT)
Dept: PHYSICAL THERAPY | Age: 32
Setting detail: THERAPIES SERIES
Discharge: HOME | End: 2022-06-02
Payer: COMMERCIAL

## 2022-06-02 PROCEDURE — 97161 PT EVAL LOW COMPLEX 20 MIN: CPT | Performed by: PHYSICAL THERAPIST

## 2022-06-02 PROCEDURE — 97110 THERAPEUTIC EXERCISES: CPT | Performed by: PHYSICAL THERAPIST

## 2022-06-02 PROCEDURE — 97112 NEUROMUSCULAR REEDUCATION: CPT | Performed by: PHYSICAL THERAPIST

## 2022-06-02 NOTE — FLOWSHEET NOTE
[address] [facility],[address], [address] [facility], [address] [facility] [address]  Phone: [phone]   Fax: [phone]    Physical Therapy Treatment Note/ Progress Report:       Date:  2022    Patient Name:  Abelino Niño    :  1990  MRN: [id number]  Restrictions/Precautions:    Medical/Treatment Diagnosis Information:  Diagnosis: Hip pain - M25.559, Femoroacetabular impingement of both hips - M25.851, M25.852  Treatment Diagnosis: decreased fn mobility, strength, ROM, ability to ambulate, balance and high-level iADLs and increased pain  Insurance/Certification information:  PT Insurance Information: [facility]  Physician Information:  Dr. Cecily Montgomery  Has the plan of care been signed (Y/N):        []  Yes  [x]  No     Date of Patient follow up with Physician: 2022       Is this a Progress Report:     []  Yes  [x]  No        Progress report/ Recertification will be due (10 Rx or 30 days whichever is less): 2022      Visit # Insurance Allowable Auth Required   /year [x]  Yes []  No        Functional Scale:  FOTO LEFS  6/2 - 47/100        Latex Allergy:  [x]NO      []YES  Preferred Language for Healthcare:   [x]English       []other:      Pain level:  R: 6/10 currently. 3-4/10 at its best. 10/10 after cortisone injection, went to ER. L: 0/10 6/2     SUBJECTIVE:  See eval 6/2    OBJECTIVE: See eval  6/2   Observation:    Test measurements:      RESTRICTIONS/PRECAUTIONS: none     Exercises/Interventions:       ROM/STRETCHES     Seated hamstring  30 x 3 Added 6/2   Seated piriformis     Supine piriformis     Supine figure 4     Quad       ITB     Butterfly     Hip flexor stretch kneeling     SKTC 10 x 5  Added 6/2        PREs     SLR     abduction 10 x 10\" iso blue strap Added 6/2   Hip extension  Attempted. Pain increase. adduction  Attempted. Pain increase.    Supine abduction with tband     Seated hip flexion with ER     clams     SL dead lift     Monster walks     Wall sit with tband     bridges     Quadruped opposite LE/UE reaches          Quad sets 10 x 10\" Added 6/2             Manual interventions              Therapeutic Exercise and NMR EXR  [x] (23286) Provided verbal/tactile cueing for activities related to strengthening, flexibility, endurance, ROM for improvements in LE, proximal hip, and core control with self care, mobility, lifting, ambulation. [x] (65020) Provided verbal/tactile cueing for activities related to improving balance, coordination, kinesthetic sense, posture, motor skill, proprioception  to assist with LE, proximal hip, and core control in self care, mobility, lifting, ambulation and eccentric single leg control.      NMR and Therapeutic Activities:    [x] (01755 or 47497) Provided verbal/tactile cueing for activities related to improving balance, coordination, kinesthetic sense, posture, motor skill, proprioception and motor activation to allow for proper function of core, proximal hip and LE with self care and ADLs  [] (57091) Gait Re-education- Provided training and instruction to the patient for proper LE, core and proximal hip recruitment and positioning and eccentric body weight control with ambulation re-education including up and down stairs     Home Exercise Program:    [x] (75551) Reviewed/Progressed HEP activities related to strengthening, flexibility, endurance, ROM of core, proximal hip and LE for functional self-care, mobility, lifting and ambulation/stair navigation   [] (22175)Reviewed/Progressed HEP activities related to improving balance, coordination, kinesthetic sense, posture, motor skill, proprioception of core, proximal hip and LE for self care, mobility, lifting, and ambulation/stair navigation      Manual Treatments:  PROM / STM / Oscillations-Mobs:  G-I, II, III, IV (PA's, Inf., Post.)  [] (36740) Provided manual therapy to mobilize LE, proximal hip and/or LS spine soft tissue/joints for the proximal hip strength and control to at least 4+/5 in all directions allow for proper functional mobility as indicated by patients Functional Deficits. [] Progressing: [] Met: [] Not Met: [] Adjusted  4. Patient will return to work related functional activities without increased symptoms or restriction. [] Progressing: [] Met: [] Not Met: [] Adjusted  5. Pt reports ability to walk and play with dog without limitation. [] Progressing: [] Met: [] Not Met: [] Adjusted         Overall Progression Towards Functional goals/ Treatment Progress Update:  [] Patient is progressing as expected towards functional goals listed. [] Progression is slowed due to complexities/Impairments listed. [] Progression has been slowed due to co-morbidities. [x] Plan just implemented, too soon to assess goals progression <30days   [] Goals require adjustment due to lack of progress  [] Patient is not progressing as expected and requires additional follow up with physician  [] Other    Prognosis for POC: [x] Good [] Fair  [] Poor      Patient requires continued skilled intervention: [x] Yes  [] No    Treatment/Activity Tolerance:  [x] Patient tolerated treatment well [] Patient limited by fatique  [] Patient limited by pain  [] Patient limited by other medical complications  [] Other:     Patient education: Patient education on PT and plan of care including diagnosis, prognosis, treatment goals and options. Patient agrees with discussed POC and treatment and is aware of rehab process. 6/2      PLAN: See eval 6/2  [] Continue per plan of care [] Alter current plan (see comments above)  [x] Plan of care initiated [] Hold pending MD visit [] Discharge      Electronically signed by:   Chico Cabrera, student physical therapist  Therapist was present, directed the patient's care, made skilled judgement, and was responsible for assessment and treatment of the patient.   Patient was in agreement to be treated by student physical therapist with licensed physical therapist present. Israel Pizarro, PT, DPT     Note: If patient does not return for scheduled/ recommended follow up visits, this note will serve as a discharge from care along with most recent update on progress.
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
## 2022-06-07 ENCOUNTER — HOSPITAL ENCOUNTER (OUTPATIENT)
Dept: PHYSICAL THERAPY | Age: 32
Setting detail: THERAPIES SERIES
Discharge: HOME | End: 2022-06-07
Payer: COMMERCIAL

## 2022-06-07 PROCEDURE — 97530 THERAPEUTIC ACTIVITIES: CPT | Performed by: PHYSICAL THERAPIST

## 2022-06-07 PROCEDURE — 97110 THERAPEUTIC EXERCISES: CPT | Performed by: PHYSICAL THERAPIST

## 2022-06-07 PROCEDURE — 97112 NEUROMUSCULAR REEDUCATION: CPT | Performed by: PHYSICAL THERAPIST

## 2022-06-07 NOTE — FLOWSHEET NOTE
[address], [address] [place], [address]  Phone: [phone]   Fax: [phone]    Physical Therapy Treatment Note/ Progress Report:       Date:  2022    Patient Name:  Fredy Vargas    :  1990  MRN: [id number]  Restrictions/Precautions:    Medical/Treatment Diagnosis Information:  Diagnosis: Hip pain - M25.559, Femoroacetabular impingement of both hips - M25.851, M25.852  Treatment Diagnosis: decreased fn mobility, strength, ROM, ability to ambulate, balance and high-level iADLs and increased pain  Insurance/Certification information:  PT Insurance Information: [facility]  Physician Information:  Dr. Margret Robins  Has the plan of care been signed (Y/N):        []  Yes  [x]  No     Date of Patient follow up with Physician: 2022       Is this a Progress Report:     []  Yes  [x]  No        Progress report/ Recertification will be due (10 Rx or 30 days whichever is less): 2022      Visit # Insurance Allowable Auth Required   2 AUTH 30/year [x]  Yes []  No        Functional Scale:  FOTO LEFS  6/2 - 47/100        Latex Allergy:  [x]NO      []YES  Preferred Language for Healthcare:   [x]English       []other:      Pain level:  R: 6/10 currently. 3-4/10 at its best. 10/10 after cortisone injection, went to ER. L: 0/10     SUBJECTIVE:  States she is sore today. She states she was doing a lot of washing clothes yesterday, so just sore. She states HEP is going ok. She states she is still having pain. She states she feels like the muscles are trying to loosen up, but still tight.        OBJECTIVE: See eval     Observation:    Test measurements:      RESTRICTIONS/PRECAUTIONS: none     Exercises/Interventions:       ROM/STRETCHES     Bike 5'  Added    Supine hamstring  30 x 3 Modified    Seated piriformis     Supine piriformis     Supine figure 4     Quad       ITB     Butterfly     Hip flexor stretch kneeling SKTC 10 x 5  Added 6/2, pain with RLE        PREs     TA brace 10 x 1   Added 6/7   SLR     abduction 10 x 10\" iso blue strap Added 6/2   Hip extension - flexed EOB 10 x 3  Added 6/7   adduction  Attempted. Pain increase. Supine abduction with tband     Seated hip flexion with ER     clams     SL dead lift     Monster walks     Wall sit with tband     bridges     Quadruped opposite LE/UE reaches     Sit to stands 8 x 2 Added 6/7   Quad sets 10 x 10\" Added 6/2             Manual interventions     STM to R hip flexor  2'  Added 6/7       Therapeutic Exercise and NMR EXR  [x] (99790) Provided verbal/tactile cueing for activities related to strengthening, flexibility, endurance, ROM for improvements in LE, proximal hip, and core control with self care, mobility, lifting, ambulation. [x] (91140) Provided verbal/tactile cueing for activities related to improving balance, coordination, kinesthetic sense, posture, motor skill, proprioception  to assist with LE, proximal hip, and core control in self care, mobility, lifting, ambulation and eccentric single leg control.      NMR and Therapeutic Activities:    [x] (02959 or 29654) Provided verbal/tactile cueing for activities related to improving balance, coordination, kinesthetic sense, posture, motor skill, proprioception and motor activation to allow for proper function of core, proximal hip and LE with self care and ADLs  [] (22495) Gait Re-education- Provided training and instruction to the patient for proper LE, core and proximal hip recruitment and positioning and eccentric body weight control with ambulation re-education including up and down stairs     Home Exercise Program:    [x] (47120) Reviewed/Progressed HEP activities related to strengthening, flexibility, endurance, ROM of core, proximal hip and LE for functional self-care, mobility, lifting and ambulation/stair navigation   [] (91581)Reviewed/Progressed HEP activities related to improving balance, coordination, kinesthetic sense, posture, motor skill, proprioception of core, proximal hip and LE for self care, mobility, lifting, and ambulation/stair navigation      Access Code [email or web address]  Updated 6/7    Manual Treatments:  PROM / STM / Oscillations-Mobs:  G-I, II, III, IV (PA's, Inf., Post.)  [] (04083) Provided manual therapy to mobilize LE, proximal hip and/or LS spine soft tissue/joints for the purpose of modulating pain, promoting relaxation,  increasing ROM, reducing/eliminating soft tissue swelling/inflammation/restriction, improving soft tissue extensibility and allowing for proper ROM for normal function with self care, mobility, lifting and ambulation. Modalities:     [] GAME READY (VASO)- for significant edema, swelling, pain control. Charges:  Timed Code Treatment Minutes: 48'    Total Treatment Minutes: 61'       [] EVAL (LOW) [address] (typically 20 minutes face-to-face)  [] EVAL (MOD) 19738 (typically 30 minutes face-to-face)  [] EVAL (HIGH) 61632 (typically 45 minutes face-to-face)  [] RE-EVAL     [x] OV(52932) x 1     [] IONTO  [x] NMR (37632) x 1   [] VASO  [] Manual (44134) x     [] Other:  [x] TA x   1   [] Mech Traction (04869)  [] ES(attended) (07238)      [] ES (un) (67763):          ASSESSMENT:      GOALS:   Patient stated goal: return to work activities without pain, walk/play with dog without pain; to be able to stand and walk for a long period of time. [] Progressing: [] Met: [] Not Met: [] Adjusted    Therapist goals for Patient:   Short Term Goals: To be achieved in: 2 weeks  1. Independent in HEP and progression per patient tolerance, in order to prevent re-injury. [] Progressing: [] Met: [] Not Met: [] Adjusted   2. Patient will have a decrease in pain to facilitate improvement in movement, function, and ADLs as indicated by Functional Deficits. [] Progressing: [] Met: [] Not Met: [] Adjusted    Long Term Goals: To be achieved in: 8 weeks  1.  Disability index score of 67% or less for the FOTO LEFS to assist with reaching prior level of function. [] Progressing: [] Met: [] Not Met: [] Adjusted  2. Patient will demonstrate increased Hip flex AROM to 120 without pain to allow for proper joint functioning as indicated by patients Functional Deficits. [] Progressing: [] Met: [] Not Met: [] Adjusted  3. Patient will demonstrate an increase in Strength to good proximal hip strength and control to at least 4+/5 in all directions allow for proper functional mobility as indicated by patients Functional Deficits. [] Progressing: [] Met: [] Not Met: [] Adjusted  4. Patient will return to work related functional activities without increased symptoms or restriction. [] Progressing: [] Met: [] Not Met: [] Adjusted  5. Pt reports ability to walk and play with dog without limitation. [] Progressing: [] Met: [] Not Met: [] Adjusted         Overall Progression Towards Functional goals/ Treatment Progress Update:  [] Patient is progressing as expected towards functional goals listed. [] Progression is slowed due to complexities/Impairments listed. [] Progression has been slowed due to co-morbidities. [x] Plan just implemented, too soon to assess goals progression <30days   [] Goals require adjustment due to lack of progress  [] Patient is not progressing as expected and requires additional follow up with physician  [] Other    Prognosis for POC: [x] Good [] Fair  [] Poor      Patient requires continued skilled intervention: [x] Yes  [] No    Treatment/Activity Tolerance:  [x] Patient tolerated treatment well [] Patient limited by fatique  [] Patient limited by pain  [] Patient limited by other medical complications  [] Other: Pt micki session. She reported tightness in the R hip throughout the session, but denied change in symptoms while completing the exercises. She reported tenderness at iliac crest while completing TA bracing.  PT also observed bruise in elbow, which patient reported of not getting better from ER visit. Pt advised to draw a pen elton around the bruise and watch to make sure it is changing and contact PCP if it is not changing. 6/7    Patient education: Patient education on PT and plan of care including diagnosis, prognosis, treatment goals and options. Patient agrees with discussed POC and treatment and is aware of rehab process. 6/2      PLAN: See eval 6/2  [] Continue per plan of care [] Alter current plan (see comments above)  [x] Plan of care initiated [] Hold pending MD visit [] Discharge      Electronically signed by:   Jessica Still, student physical therapist  Therapist was present, directed the patient's care, made skilled judgement, and was responsible for assessment and treatment of the patient. Patient was in agreement to be treated by student physical therapist with licensed physical therapist present. Link Richardson, PT, DPT     Note: If patient does not return for scheduled/ recommended follow up visits, this note will serve as a discharge from care along with most recent update on progress.

## 2022-06-09 ENCOUNTER — HOSPITAL ENCOUNTER (OUTPATIENT)
Dept: PHYSICAL THERAPY | Age: 32
Setting detail: THERAPIES SERIES
Discharge: HOME | End: 2022-06-09
Payer: COMMERCIAL

## 2022-06-09 PROCEDURE — 97530 THERAPEUTIC ACTIVITIES: CPT | Performed by: PHYSICAL THERAPIST

## 2022-06-09 PROCEDURE — 97112 NEUROMUSCULAR REEDUCATION: CPT | Performed by: PHYSICAL THERAPIST

## 2022-06-09 PROCEDURE — 97110 THERAPEUTIC EXERCISES: CPT | Performed by: PHYSICAL THERAPIST

## 2022-06-09 NOTE — FLOWSHEET NOTE
[] (80821)Reviewed/Progressed HEP activities related to improving balance, coordination, kinesthetic sense, posture, motor skill, proprioception of core, proximal hip and LE for self care, mobility, lifting, and ambulation/stair navigation      Access Code [email or web address]  Updated 6/7    Manual Treatments:  PROM / STM / Oscillations-Mobs:  G-I, II, III, IV (PA's, Inf., Post.)  [] (17437) Provided manual therapy to mobilize LE, proximal hip and/or LS spine soft tissue/joints for the purpose of modulating pain, promoting relaxation,  increasing ROM, reducing/eliminating soft tissue swelling/inflammation/restriction, improving soft tissue extensibility and allowing for proper ROM for normal function with self care, mobility, lifting and ambulation. Modalities:     [] GAME READY (VASO)- for significant edema, swelling, pain control. Charges:  Timed Code Treatment Minutes: 45'   Total Treatment Minutes: 48'      [] EVAL (LOW) 26372 (typically 20 minutes face-to-face)  [] EVAL (MOD) 74498 (typically 30 minutes face-to-face)  [] EVAL (HIGH) 24556 (typically 45 minutes face-to-face)  [] RE-EVAL     [x] RB(79985) x 1     [] IONTO  [x] NMR (90545) x 1   [] VASO  [] Manual (84664) x     [] Other:  [x] TA x   1   [] Mech Traction (64364)  [] ES(attended) (89070)      [] ES (un) (96262):          ASSESSMENT:      GOALS:   Patient stated goal: return to work activities without pain, walk/play with dog without pain; to be able to stand and walk for a long period of time. [] Progressing: [] Met: [] Not Met: [] Adjusted    Therapist goals for Patient:   Short Term Goals: To be achieved in: 2 weeks  1. Independent in HEP and progression per patient tolerance, in order to prevent re-injury. [] Progressing: [] Met: [] Not Met: [] Adjusted   2. Patient will have a decrease in pain to facilitate improvement in movement, function, and ADLs as indicated by Functional Deficits.     [] Progressing: [] Met: [] Not Met: [] Adjusted    Long Term Goals: To be achieved in: 8 weeks  1. Disability index score of 67% or less for the FOTO LEFS to assist with reaching prior level of function. [] Progressing: [] Met: [] Not Met: [] Adjusted  2. Patient will demonstrate increased Hip flex AROM to 120 without pain to allow for proper joint functioning as indicated by patients Functional Deficits. [] Progressing: [] Met: [] Not Met: [] Adjusted  3. Patient will demonstrate an increase in Strength to good proximal hip strength and control to at least 4+/5 in all directions allow for proper functional mobility as indicated by patients Functional Deficits. [] Progressing: [] Met: [] Not Met: [] Adjusted  4. Patient will return to work related functional activities without increased symptoms or restriction. [] Progressing: [] Met: [] Not Met: [] Adjusted  5. Pt reports ability to walk and play with dog without limitation. [] Progressing: [] Met: [] Not Met: [] Adjusted         Overall Progression Towards Functional goals/ Treatment Progress Update:  [] Patient is progressing as expected towards functional goals listed. [] Progression is slowed due to complexities/Impairments listed. [] Progression has been slowed due to co-morbidities. [x] Plan just implemented, too soon to assess goals progression <30days   [] Goals require adjustment due to lack of progress  [] Patient is not progressing as expected and requires additional follow up with physician  [] Other    Prognosis for POC: [x] Good [] Fair  [] Poor      Patient requires continued skilled intervention: [x] Yes  [] No    Treatment/Activity Tolerance:  [x] Patient tolerated treatment well [] Patient limited by fatique  [] Patient limited by pain  [] Patient limited by other medical complications  [] Other: Pt micki session. She reported tightness in the R hip throughout the session with pain during sit to stands. She reported tenderness at iliac crest while completing TA bracing. Pt tolerated seated hip flexion and seated flexion LAQ with muscle fatigue but no increase in pain. Bruise on elbow had improved coloration. PT recommends continued POC to address impairments, improve functional ROM to facilitate return to pain free ADLs. 6/9    Patient education: Patient education on PT and plan of care including diagnosis, prognosis, treatment goals and options. Patient agrees with discussed POC and treatment and is aware of rehab process. 6/2      PLAN: See eval 6/2  [] Continue per plan of care [] Alter current plan (see comments above)  [x] Plan of care initiated [] Hold pending MD visit [] Discharge      Electronically signed by:   Minus Foots, student physical therapist  Therapist was present, directed the patient's care, made skilled judgement, and was responsible for assessment and treatment of the patient. Patient was in agreement to be treated by student physical therapist with licensed physical therapist present. Carola Gallego, PT, DPT     Note: If patient does not return for scheduled/ recommended follow up visits, this note will serve as a discharge from care along with most recent update on progress.

## 2022-06-11 DIAGNOSIS — F51.04 PSYCHOPHYSIOLOGIC INSOMNIA: ICD-10-CM

## 2022-06-13 ENCOUNTER — HOSPITAL ENCOUNTER (OUTPATIENT)
Dept: PHYSICAL THERAPY | Age: 32
Setting detail: THERAPIES SERIES
Discharge: HOME | End: 2022-06-13
Payer: COMMERCIAL

## 2022-06-13 PROCEDURE — 97110 THERAPEUTIC EXERCISES: CPT

## 2022-06-13 PROCEDURE — 97112 NEUROMUSCULAR REEDUCATION: CPT

## 2022-06-13 PROCEDURE — 97530 THERAPEUTIC ACTIVITIES: CPT

## 2022-06-13 RX ORDER — AMITRIPTYLINE HYDROCHLORIDE 25 MG/1
TABLET, FILM COATED ORAL
Qty: 90 TABLET | Refills: 1 | Status: SHIPPED | OUTPATIENT
Start: 2022-06-13

## 2022-06-13 NOTE — FLOWSHEET NOTE
[address], [address] [place], [address]  Phone: [phone]   Fax: [phone]    Physical Therapy Treatment Note/ Progress Report:       Date:  2022    Patient Name:  Reyes Quintero    :  1990  MRN: [id number]  Restrictions/Precautions:    Medical/Treatment Diagnosis Information:  Diagnosis: Hip pain - M25.559, Femoroacetabular impingement of both hips - M25.851, M25.852  Treatment Diagnosis: decreased fn mobility, strength, ROM, ability to ambulate, balance and high-level iADLs and increased pain  Insurance/Certification information:  PT Insurance Information: [facility]  Physician Information:  Dr. Ozzy Ferguson  Has the plan of care been signed (Y/N):        []  Yes  [x]  No     Date of Patient follow up with Physician: 2022       Is this a Progress Report:     []  Yes  [x]  No        Progress report/ Recertification will be due (10 Rx or 30 days whichever is less): 2022      Visit # Insurance Allowable Auth Required   4 AUTH 30/year [x]  Yes []  No        Functional Scale:  FOTO LEFS  / - 47/100        Latex Allergy:  [x]NO      []YES  Preferred Language for Healthcare:   [x]English       []other:      Pain level:  R: 4/10 currently/constantly. 10/10 at its worst. went to ER. L: 2/10     SUBJECTIVE:  Patient states she has been feeling okay. The pain is minimal in R groin but has been increased in lateral aspect of L and R leg. The pain can travel from enter of low back into glutes and mid hamstrings. She feels this is sciatica, however, the past two days it has been solely in mid hamstrings bilaterally. It can last a couple minutes or up to 30 minutes. No currently activity that increases her pain, but it does seem to occur when she is being active.      OBJECTIVE: See eval     Observation:    Test measurements:      RESTRICTIONS/PRECAUTIONS: none     Exercises/Interventions:       ROM/STRETCHES Bike 5'  Added 6/7   Supine hamstring  30 x 3    Seated piriformis     Supine piriformis     Supine figure 4     Supine sciatic nerve glide x15 Added 6/13   Quad       ITB     Butterfly     Hip flexor stretch kneeling     SKTC 10 x 5  Added 6/2, pain with RLE        PREs     TA brace 10 x 5\"   ^6/9   Prone glute set 10x10\"    SLR     abduction 10 x 10\" iso blue strap Added 6/2   Hip extension - flexed EOB 10 x 3  Added 6/7   Adduction ball squeeze 10x10\" iso Added on 6/13   Supine abduction with tband     Seated hip flexion 10 x 3 Added 6/9   Seated hip flexion + LAQ 10 x 3 Added 6/9  Painful on LLE on 6/13   Clams      SL dead lift     Monster walks     Wall sit with tband     bridges     Quadruped opposite LE/UE reaches     Sit to stands  Attempted but increased pain  Not attempted on 6/13 due to pain last visit   Quad sets  Added 6/2             Manual interventions     STM to R hip flexor      Therapeutic Exercise and NMR EXR  [x] (30981) Provided verbal/tactile cueing for activities related to strengthening, flexibility, endurance, ROM for improvements in LE, proximal hip, and core control with self care, mobility, lifting, ambulation. [x] (51883) Provided verbal/tactile cueing for activities related to improving balance, coordination, kinesthetic sense, posture, motor skill, proprioception  to assist with LE, proximal hip, and core control in self care, mobility, lifting, ambulation and eccentric single leg control.      NMR and Therapeutic Activities:    [x] (58086 or 44933) Provided verbal/tactile cueing for activities related to improving balance, coordination, kinesthetic sense, posture, motor skill, proprioception and motor activation to allow for proper function of core, proximal hip and LE with self care and ADLs  [] (04214) Gait Re-education- Provided training and instruction to the patient for proper LE, core and proximal hip recruitment and positioning and eccentric body weight control with ambulation re-education including up and down stairs     Home Exercise Program:    [x] (88333) Reviewed/Progressed HEP activities related to strengthening, flexibility, endurance, ROM of core, proximal hip and LE for functional self-care, mobility, lifting and ambulation/stair navigation   [] (59889)Reviewed/Progressed HEP activities related to improving balance, coordination, kinesthetic sense, posture, motor skill, proprioception of core, proximal hip and LE for self care, mobility, lifting, and ambulation/stair navigation      Access Code [email or web address]  Updated 6/7    Manual Treatments:  PROM / STM / Oscillations-Mobs:  G-I, II, III, IV (PA's, Inf., Post.)  [] (97305) Provided manual therapy to mobilize LE, proximal hip and/or LS spine soft tissue/joints for the purpose of modulating pain, promoting relaxation,  increasing ROM, reducing/eliminating soft tissue swelling/inflammation/restriction, improving soft tissue extensibility and allowing for proper ROM for normal function with self care, mobility, lifting and ambulation. Modalities:     [] GAME READY (VASO)- for significant edema, swelling, pain control. Charges:  Timed Code Treatment Minutes: 64'   Total Treatment Minutes: 64'   146-242   [] EVAL (LOW) 29639 (typically 20 minutes face-to-face)  [] EVAL (MOD) 04885 (typically 30 minutes face-to-face)  [] EVAL (HIGH) 24803 (typically 45 minutes face-to-face)  [] RE-EVAL     [x] MB(18366) x 1     [] IONTO  [x] NMR (82632) x 2   [] VASO  [] Manual (31724) x     [] Other:  [x] TA x   1   [] Mech Traction (74819)  [] ES(attended) (45879)      [] ES (un) (33377):          ASSESSMENT:      GOALS:   Patient stated goal: return to work activities without pain, walk/play with dog without pain; to be able to stand and walk for a long period of time. [] Progressing: [] Met: [] Not Met: [] Adjusted    Therapist goals for Patient:   Short Term Goals: To be achieved in: 2 weeks  1.  Independent in HEP and progression per patient tolerance, in order to prevent re-injury. [] Progressing: [] Met: [] Not Met: [] Adjusted   2. Patient will have a decrease in pain to facilitate improvement in movement, function, and ADLs as indicated by Functional Deficits. [] Progressing: [] Met: [] Not Met: [] Adjusted    Long Term Goals: To be achieved in: 8 weeks  1. Disability index score of 67% or less for the FOTO LEFS to assist with reaching prior level of function. [] Progressing: [] Met: [] Not Met: [] Adjusted  2. Patient will demonstrate increased Hip flex AROM to 120 without pain to allow for proper joint functioning as indicated by patients Functional Deficits. [] Progressing: [] Met: [] Not Met: [] Adjusted  3. Patient will demonstrate an increase in Strength to good proximal hip strength and control to at least 4+/5 in all directions allow for proper functional mobility as indicated by patients Functional Deficits. [] Progressing: [] Met: [] Not Met: [] Adjusted  4. Patient will return to work related functional activities without increased symptoms or restriction. [] Progressing: [] Met: [] Not Met: [] Adjusted  5. Pt reports ability to walk and play with dog without limitation. [] Progressing: [] Met: [] Not Met: [] Adjusted         Overall Progression Towards Functional goals/ Treatment Progress Update:  [] Patient is progressing as expected towards functional goals listed. [] Progression is slowed due to complexities/Impairments listed. [] Progression has been slowed due to co-morbidities.   [x] Plan just implemented, too soon to assess goals progression <30days   [] Goals require adjustment due to lack of progress  [] Patient is not progressing as expected and requires additional follow up with physician  [] Other    Prognosis for POC: [x] Good [] Fair  [] Poor      Patient requires continued skilled intervention: [x] Yes  [] No    Treatment/Activity Tolerance:  [x] Patient tolerated treatment well [] Patient limited by fatique  [] Patient limited by pain  [] Patient limited by other medical complications  [x] Other: Patient tolerated session well. She did have increase in pain with LLE completing seated hip flexion + knee extension this visit. Patient able to complete adduction and glute isometrics without increase in symptoms. Patient did have good carryover with glute set into bent over hip extension. Patient will continue to be progressed through functional stability and mobility activities in order to improve her pain free level of function. Patient education: Patient education on PT and plan of care including diagnosis, prognosis, treatment goals and options. Patient agrees with discussed POC and treatment and is aware of rehab process. 6/2      PLAN: See eval 6/2  [] Continue per plan of care [] Alter current plan (see comments above)  [x] Plan of care initiated [] Hold pending MD visit [] Discharge      Electronically signed by: Dayna Lloyd, PT, DPT           Note: If patient does not return for scheduled/ recommended follow up visits, this note will serve as a discharge from care along with most recent update on progress.

## 2022-06-13 NOTE — TELEPHONE ENCOUNTER
Last appointment: 4/26/2022  Next appointment: 6/14/2022  Last refill: looks like this medication was discontinued by another provider  Patient has appt tomorrow

## 2022-06-14 ENCOUNTER — OFFICE VISIT (OUTPATIENT)
Dept: INTERNAL MEDICINE CLINIC | Age: 32
End: 2022-06-14
Payer: COMMERCIAL

## 2022-06-14 VITALS
WEIGHT: 261 LBS | SYSTOLIC BLOOD PRESSURE: 106 MMHG | HEART RATE: 70 BPM | OXYGEN SATURATION: 97 % | RESPIRATION RATE: 14 BRPM | BODY MASS INDEX: 43.43 KG/M2 | DIASTOLIC BLOOD PRESSURE: 74 MMHG

## 2022-06-14 DIAGNOSIS — I10 ESSENTIAL HYPERTENSION: ICD-10-CM

## 2022-06-14 DIAGNOSIS — J45.40 MODERATE PERSISTENT ASTHMA WITHOUT COMPLICATION: ICD-10-CM

## 2022-06-14 DIAGNOSIS — E11.65 UNCONTROLLED TYPE 2 DIABETES MELLITUS WITH HYPERGLYCEMIA (HCC): Primary | ICD-10-CM

## 2022-06-14 DIAGNOSIS — Z23 NEED FOR VACCINATION: ICD-10-CM

## 2022-06-14 DIAGNOSIS — J98.4 CAVITARY LESION OF LUNG: ICD-10-CM

## 2022-06-14 LAB — HBA1C MFR BLD: 9.3 %

## 2022-06-14 PROCEDURE — G8427 DOCREV CUR MEDS BY ELIG CLIN: HCPCS | Performed by: INTERNAL MEDICINE

## 2022-06-14 PROCEDURE — 99214 OFFICE O/P EST MOD 30 MIN: CPT | Performed by: INTERNAL MEDICINE

## 2022-06-14 PROCEDURE — 83036 HEMOGLOBIN GLYCOSYLATED A1C: CPT | Performed by: INTERNAL MEDICINE

## 2022-06-14 PROCEDURE — 3046F HEMOGLOBIN A1C LEVEL >9.0%: CPT | Performed by: INTERNAL MEDICINE

## 2022-06-14 PROCEDURE — 4004F PT TOBACCO SCREEN RCVD TLK: CPT | Performed by: INTERNAL MEDICINE

## 2022-06-14 PROCEDURE — 90471 IMMUNIZATION ADMIN: CPT | Performed by: INTERNAL MEDICINE

## 2022-06-14 PROCEDURE — G8417 CALC BMI ABV UP PARAM F/U: HCPCS | Performed by: INTERNAL MEDICINE

## 2022-06-14 PROCEDURE — 2022F DILAT RTA XM EVC RTNOPTHY: CPT | Performed by: INTERNAL MEDICINE

## 2022-06-14 PROCEDURE — 90677 PCV20 VACCINE IM: CPT | Performed by: INTERNAL MEDICINE

## 2022-06-14 RX ORDER — DULAGLUTIDE 1.5 MG/.5ML
1.5 INJECTION, SOLUTION SUBCUTANEOUS WEEKLY
Qty: 4 PEN | Refills: 5 | Status: SHIPPED | OUTPATIENT
Start: 2022-06-14

## 2022-06-14 ASSESSMENT — ENCOUNTER SYMPTOMS: SHORTNESS OF BREATH: 0

## 2022-06-14 NOTE — ASSESSMENT & PLAN NOTE
Found incidentally on Abd CT 9/2021. Prior CT recommended 3 month follow up. DDx includes infection, septic emboli, fungal infection like histo, mycobacterial infection, sarcoid, rheumatoid.

## 2022-06-14 NOTE — PROGRESS NOTES
Gera Grant (:  1990) is a 36 y.o. female,Established patient, here for evaluation of the following chief complaint(s):  Diabetes      ASSESSMENT/PLAN:  1. Uncontrolled type 2 diabetes mellitus with hyperglycemia ([address]  Assessment & Plan:  A1C stable but high. Tolerating the trulicity. Still on low dose. Recent CSI injection x 2 in hips. Will increase trulicity to 9.8UA weekly. [id number] today. Orders:  -     POCT glycosylated hemoglobin (Hb A1C)  -     Dulaglutide (TRULICITY) 1.5 CR/4.0QS SOPN; Inject 1.5 mg into the skin once a week, Disp-4 pen, R-5Normal  2. Cavitary lesion of lung  Assessment & Plan:  Found incidentally on Abd CT 2021. Prior CT recommended 3 month follow up. DDx includes infection, septic emboli, fungal infection like histo, mycobacterial infection, sarcoid, rheumatoid. 3. Essential hypertension  Assessment & Plan:   Well-controlled, continue current medications  4. Moderate persistent asthma without complication  Assessment & Plan: Things are currently well controlled. Advair with prn albuterol. 5. Need for vaccination  -     Pneumococcal, PCV20, PREVNAR 20, (age 25 yrs+), IM, PF      Return in about 3 months (around 2022). SUBJECTIVE/OBJECTIVE:  HPI    Overall she is feeling well. She just had bilateral hip injections which caused severe pain immediately. That severe pain is worn off and she is now doing physical therapy which has helped some. She is also now having some sciatica type pain which she is working on in therapy. She is tolerating the Trulicity without any issues. No side effects. Review of Systems   Constitutional: Negative for chills, fatigue and fever. Respiratory: Negative for shortness of breath. Cardiovascular: Negative for chest pain. Musculoskeletal: Positive for arthralgias. Psychiatric/Behavioral: Negative for dysphoric mood.        Current Outpatient Medications on File Prior to Visit   Medication Sig Dispense Refill  amitriptyline (ELAVIL) 25 mg tablet TAKE 1 TABLET BY MOUTH EVERY DAY AT NIGHT 90 tablet 1    triamcinolone (ARISTOCORT) 0.5 % cream APPLY TO AFFECTED AREA TWICE A DAY 30 g 2    Loratadine-Pseudoephedrine (CLARITIN-D 24 HOUR PO) Take by mouth      sodium chloride (ALTAMIST SPRAY) 0.65 % nasal spray 1 spray by Nasal route every 2 hours as needed for Congestion 88 each 5    vitamin D (CHOLECALCIFEROL) 50 MCG (2000 UT) TABS tablet TAKE 1 TABLET BY MOUTH EVERY DAY 30 tablet 11    albuterol sulfate HFA (PROAIR HFA) 108 (90 Base) MCG/ACT inhaler Inhale 2 puffs into the lungs every 6 hours as needed for Wheezing or Shortness of Breath Dispense with SPACER and Instruct on use 18 g 1    fluticasone-salmeterol (ADVAIR DISKUS) 250-50 MCG/DOSE AEPB Inhale 1 puff into the lungs every 12 hours 60 each 1    Dulaglutide (TRULICITY) 7.17 PD/9.7LY SOPN Inject 0.75 mg into the skin once a week 4 pen 2    NIFEdipine (ADALAT CC) 30 MG extended release tablet TAKE 1 TABLET BY MOUTH EVERY DAY 90 tablet 1    metFORMIN (GLUCOPHAGE-XR) 500 MG extended release tablet TAKE 1 TABLET BY MOUTH EVERY DAY AT NIGHT 90 tablet 1    OneTouch Delica Lancets 77Z MISC 1 each by Does not apply route daily 100 each 11    Lancet Devices (ONE TOUCH DELICA LANCING DEV) MISC 1 Device by Does not apply route daily 1 each 0    Blood Glucose Monitoring Suppl (ONE TOUCH ULTRA 2) w/Device KIT 1 kit by Does not apply route daily 1 kit 0    blood glucose test strips (ONETOUCH ULTRA) strip 1 each by In Vitro route daily 100 each 11    [DISCONTINUED] vitamin D (ERGOCALCIFEROL) 1.25 MG (61138 UT) CAPS capsule TAKE 1 CAPSULE BY MOUTH ONE TIME PER WEEK 4 capsule 1     No current facility-administered medications on file prior to visit. Vitals:    06/14/22 1424   BP: 106/74   Pulse: 70   Resp: 14   SpO2: 97%     Physical Exam  Constitutional:       General: She is not in acute distress. Appearance: Normal appearance. She is not diaphoretic. HENT:      Head: Normocephalic and atraumatic. Right Ear: External ear normal.      Left Ear: External ear normal.      Nose: Nose normal.      Mouth/Throat:      Mouth: Mucous membranes are moist.      Pharynx: Oropharynx is clear. Eyes:      General: No scleral icterus. Conjunctiva/sclera: Conjunctivae normal.   Cardiovascular:      Rate and Rhythm: Normal rate and regular rhythm. Pulses: Normal pulses. Heart sounds: Normal heart sounds. No murmur heard. No friction rub. No gallop. Pulmonary:      Effort: Pulmonary effort is normal.      Breath sounds: Normal breath sounds. No wheezing, rhonchi or rales. Abdominal:      General: Abdomen is flat. Bowel sounds are normal.   Musculoskeletal:         General: No deformity. Cervical back: Normal range of motion. Right lower leg: No edema. Left lower leg: No edema. Skin:     General: Skin is warm and dry. Findings: No rash. Neurological:      General: No focal deficit present. Mental Status: She is alert. Coordination: Coordination normal.      Gait: Gait normal.   Psychiatric:         Mood and Affect: Mood normal.         Behavior: Behavior normal.           On this date 6/14/2022 I have spent 30 minutes reviewing previous notes, test results and face to face with the patient discussing the diagnosis and importance of compliance with the treatment plan as well as documenting on the day of the visit. An electronic signature was used to authenticate this note.     --Kirstin Pizarro MD

## 2022-06-14 NOTE — ASSESSMENT & PLAN NOTE
A1C stable but high. Tolerating the trulicity. Still on low dose. Recent CSI injection x 2 in hips. Will increase trulicity to 1.8MI weekly. [id number] today.

## 2022-06-15 ENCOUNTER — HOSPITAL ENCOUNTER (OUTPATIENT)
Dept: PHYSICAL THERAPY | Age: 32
Setting detail: THERAPIES SERIES
Discharge: HOME | End: 2022-06-15
Payer: COMMERCIAL

## 2022-06-15 NOTE — FLOWSHEET NOTE
[facility]       Physical Therapy   Phone: [phone]   Fax: [phone]      Physical Therapy  Cancellation/No-show Note  Patient Name:  Kurt Roach  :  1990   Date:  6/15/2022  Cancelled visits to date: 1  No-shows to date: 0    For today's appointment patient:  [x]  Cancelled  []  Rescheduled appointment  []  No-show     Reason given by patient:  [x]  Patient ill  []  Conflicting appointment  []  No transportation    []  Conflict with work  []  No reason given  []  Other:     Comments:  Not feeling well    Electronically signed by:  Rambo Saleh, PT, DPT

## 2022-06-20 ENCOUNTER — HOSPITAL ENCOUNTER (OUTPATIENT)
Dept: PHYSICAL THERAPY | Age: 32
Setting detail: THERAPIES SERIES
Discharge: HOME | End: 2022-06-20
Payer: COMMERCIAL

## 2022-06-20 NOTE — FLOWSHEET NOTE
[address] [facility]       Physical Therapy   Phone: [phone]   Fax: [phone]      Physical Therapy  Cancellation/No-show Note  Patient Name:  Kali Gutierrez  :  1990   Date:  2022  Cancelled visits to date: 1  No-shows to date: 01    For today's appointment patient:  []  Cancelled  []  Rescheduled appointment  [x]  No-show     Reason given by patient:  []  Patient ill  []  Conflicting appointment  []  No transportation    []  Conflict with work  [x]  No reason given  []  Other:     Comments:  Voicemail left for patient.      Electronically signed by:  Saman Jha PT, DPT

## 2022-06-22 ENCOUNTER — OFFICE VISIT (OUTPATIENT)
Dept: ORTHOPEDIC SURGERY | Age: 32
End: 2022-06-22
Payer: COMMERCIAL

## 2022-06-22 ENCOUNTER — HOSPITAL ENCOUNTER (OUTPATIENT)
Dept: PHYSICAL THERAPY | Age: 32
Setting detail: THERAPIES SERIES
Discharge: HOME | End: 2022-06-22
Payer: COMMERCIAL

## 2022-06-22 ENCOUNTER — APPOINTMENT (OUTPATIENT)
Dept: PHYSICAL THERAPY | Age: 32
End: 2022-06-22
Payer: COMMERCIAL

## 2022-06-22 VITALS — BODY MASS INDEX: 43.32 KG/M2 | WEIGHT: 260 LBS | HEIGHT: 65 IN

## 2022-06-22 DIAGNOSIS — M54.31 BILATERAL SCIATICA: ICD-10-CM

## 2022-06-22 DIAGNOSIS — S39.012A STRAIN OF LUMBAR REGION, INITIAL ENCOUNTER: ICD-10-CM

## 2022-06-22 DIAGNOSIS — M54.32 BILATERAL SCIATICA: ICD-10-CM

## 2022-06-22 DIAGNOSIS — M25.851 FEMOROACETABULAR IMPINGEMENT OF BOTH HIPS: Primary | ICD-10-CM

## 2022-06-22 DIAGNOSIS — M25.852 FEMOROACETABULAR IMPINGEMENT OF BOTH HIPS: Primary | ICD-10-CM

## 2022-06-22 PROCEDURE — 97530 THERAPEUTIC ACTIVITIES: CPT | Performed by: PHYSICAL THERAPIST

## 2022-06-22 PROCEDURE — 99214 OFFICE O/P EST MOD 30 MIN: CPT | Performed by: ORTHOPAEDIC SURGERY

## 2022-06-22 PROCEDURE — 4004F PT TOBACCO SCREEN RCVD TLK: CPT | Performed by: ORTHOPAEDIC SURGERY

## 2022-06-22 PROCEDURE — G8427 DOCREV CUR MEDS BY ELIG CLIN: HCPCS | Performed by: ORTHOPAEDIC SURGERY

## 2022-06-22 PROCEDURE — 97110 THERAPEUTIC EXERCISES: CPT | Performed by: PHYSICAL THERAPIST

## 2022-06-22 PROCEDURE — G8417 CALC BMI ABV UP PARAM F/U: HCPCS | Performed by: ORTHOPAEDIC SURGERY

## 2022-06-22 PROCEDURE — 97112 NEUROMUSCULAR REEDUCATION: CPT | Performed by: PHYSICAL THERAPIST

## 2022-06-22 RX ORDER — GABAPENTIN 100 MG/1
100 CAPSULE ORAL 2 TIMES DAILY
Qty: 60 CAPSULE | Refills: 1 | Status: SHIPPED | OUTPATIENT
Start: 2022-06-22 | End: 2022-09-12

## 2022-06-22 NOTE — LETTER
[facility]  [address] [facility] [address]  Phone: [phone]  Fax: [phone]    Kierra Clement MD    June 22, 2022     Morris Sims, [address] [address]    Patient: Tennille Philip   MR Number: [id number]   YOB: 1990   Date of Visit: 6/22/2022       Dear Morris Sims: Thank you for referring Eloisa Leyva to me for evaluation/treatment. Below are the relevant portions of my assessment and plan of care. If you have questions, please do not hesitate to call me. I look forward to following Atul along with you.     Sincerely,      Kierra Clement MD

## 2022-06-22 NOTE — PROGRESS NOTES
Chief Complaint  Hip Pain (F/U BILATERAL HIP PAIN)      History of Present Illness:  Tahir Barakat is a pleasant 36 y.o. female who presents today for follow up of her bilateral hip pain. Pain assessment as described below. She notes having a hip pain for many years, but has recently worsened in the past 2 months. No new injury to date. Right hip continues to be more bothersome than left hip. She recently underwent bilateral intra-articular hip injections with IR at [facility] on 05/31/2022. She states hip pain has improved since undergoing injections, reducing by about 50%. Prolonged standing continues to aggravate her symptoms. She does report new onset low back and sciatic pain from her back that travels down both thighs, but remains above the knee. She reports having radiating pain down both legs currently. She denies any bowel or bladder symptoms. No previous confirmed diagnosis of sciatica. Medical History:  Patient's medications, allergies, past medical, surgical, social and family histories were reviewed and updated as appropriate. Pain Assessment  Location of Pain:  (HIP)  Location Modifiers: Right  Severity of Pain: 2  Quality of Pain: Throbbing  Frequency of Pain: Intermittent  Aggravating Factors: Standing  Limiting Behavior: No  Relieving Factors: Rest,Ice  Result of Injury: Yes  Work-Related Injury: No  Are there other pain locations you wish to document?: No  ROS: Review of systems reviewed from Patient History Form completed today and available in the patient's chart under the Media tab. Pertinent items are noted in HPI  Review of systems reviewed from Patient History Form completed today and available in the patient's chart under the Media tab. Vital Signs:  Ht 5' 5\" (1.651 m)   Wt 260 lb (117.9 kg)   LMP 05/31/2022   BMI 43.27 kg/m²         Neuro: Alert & oriented x 3,  normal,  no focal deficits noted. Normal affect.   Eyes: sclera clear  Ears: Normal external ear  Mouth:  No perioral lesions  Pulm: Respirations unlabored and regular  Pulse: Extremities well perfused. 2+ peripheral pulses. Skin: Warm. No ulcerations. Constitutional: The physical examination finds the patient to be well-developed and well-nourished. The patient is alert and oriented x3 and was cooperative throughout the visit. LUMBAR/SACRAL EXAMINATION:    Gait & station: normal, patient ambulates without assistance    Inspection: Local inspection shows no step-off or bruising. Lumbar alignment is normal.    Skin: There are no rashes, ulcerations or lesions. Palpation: No paraspinous tenderness present bilaterally in lower lumbar region. There is no paraspinal spasm. Range of Motion: No limitation of motion. Strength: Strength testing is 5/5 in all muscle groups tested. Reflexes: Reflexes are symmetrically 2+ at the patellar and ankle tendons. Clonus absent bilaterally at the feet. Special Tests: Straight leg raise and crossed SLR negative. Leg length and pelvis level. Additional Examinations: Negative Trendelenburg test.      Hip Examination: bilateral    Skin/Inspection: No skin lesions, cellulitis, or extreme edema in the lower extremities. Standing/Walking:  Antalgic gait, negative Trendelenburg sign. Supine Exam: Non tender around the ASIS, AIIS  Flexion arc 0 to 100deg, IR 25 deg, ER 45 deg full range with pain  Special Tests: Less provocative deep Flexion Test    Less provocative FADIR that is anterior     Mildly provocative pain with ALEXIS that is anterior       Side Lying Exam: not tender at greater trochanter, not tender abductor musculature,   Abductor side leg raise 5/5, tight.  OberTest      Distal Neurovascular exam is intact (foot sensation, pulses, and motor exam)      Diagnostics:  No new imaging was obtained today        Assessment: Patient is a 36 y.o. female who had relief to bilateral intra-articular hip cortisone injections administered under IR guidance in hospital.  Despite initial therapy and symptoms the injections were ultimately therapeutic to her bilateral intra-articular hip joints. At this point she has low back pain and possible sciatica. I believe this is related to a clinical diagnosis of degenerative disc disease and trigger points are gluteus robyn complex. Impression:  Visit Diagnoses       Codes    Femoroacetabular impingement of both hips    -  Primary M25.851, M25.852    Bilateral sciatica     M54.31, M54.32    Strain of lumbar region, initial encounter     S39.012A          Office Procedures:  Orders Placed This Encounter   Medications    gabapentin (NEURONTIN) 100 MG capsule     Sig: Take 1 capsule by mouth 2 times daily for 60 days. Intended supply: 30 days     Dispense:  60 capsule     Refill:  1     No orders of the defined types were placed in this encounter. Plan:  Pertinent imaging was reviewed. The etiology, natural history, and treatment options for the disorder were discussed. The roles of activity modification, antiinflammatory medicine, injections, bracing, physical therapy, and surgical interventions were all described to the patient and questions were answered. We believe patient is a candidate for continued non-operative treatment. She is seeing improvement from her bilateral hip injections on 05/31/2022. I recommend she continue with physical therapy for gluteus robyn trigger point therapy, along with a low back conditioning program.  A new letter was sent by epic today. Lastly, I will prescribe Gabapentin 100mg BID with 1 refill. This was sent to the patient's pharmacy today. Should she note no improvement to the gabapentin then I will see her back in about further work-up of her lumbar spine. All the patient's questions were answered while in the clinic. The patient is understanding of all instructions and agrees with the plan.     Approximately 25 minutes was spent on patient education and coordinating care. Follow up in: Return in about 4 weeks (around 7/20/2022), or if symptoms worsen or fail to improve. I, Shanell Granados, am scribing for Dr. Jayla Hill MD.  06/22/22 3:35 PM Shanell Granados. The physical examination was performed between the patient and Dr. Jayla Hill MD.  All counseling during the appointment was performed between the patient and the provider. Sincerely,    Jayla Hill MD [address] [facility] [address] [place] [address], [id number]  Email: [email or web address]  Office: [phone]    06/22/22  3:35 PM        The encounter with Ingrid Ku was carried out by myself, Dr Dionicio Badillo, who personally examined the patient and reviewed the plan. This dictation was performed with a verbal recognition program (DRAGON) and it was checked for errors. It is possible that there are still dictated errors within this office note. If so, please bring any errors to my attention for an addendum. All efforts were made to ensure that this office note is accurate.

## 2022-06-22 NOTE — LETTER
Physical Therapy Rehabilitation Referral    Patient Name: Jan Carreon MRN: [id number]  DOS: 6/22/2022     Diagnosis:   1. Femoroacetabular impingement of both hips    2. Bilateral sciatica    3.  Strain of lumbar region, initial encounter            Precautions:     [x] Evaluate and Treat    Post Op Instructions:  [] 20 lb flat foot weight bearing x 3 weeks with crutches  [] 20 lb flat foot weight bearing x 4 weeks with crutches (cartilage repair protocol)  [] Weight bearing as tolerated x 2 weeks with crutches  [] No active abduction x 6 weeks (abductor repair protocol)  [] Continuous passive motion (CPM)   [] AAROM: Flexion to 90   [] Uni-planar passive range of motion   [] AAROM: External rotation to 10   [] Hip brace on at all times    [] AAROM: Extension to 10   [] Hip brace when hip at risk     [] AAROM:  Neutral IR   [] Home exercise program (copy to patient)   [] AAROM: Abduction to 25    [] Isometric external rotator strengthening    [] Isometric glute max strengthening     [] Isometric abductor strengthening     [] Leg Circumduction (PT and family member assisted x 3 weeks)                 Post-op Phases:  []Phase I: Maximum Protection (Day 1-3 Weeks)  []Phase II: Mobility & Neuromuscular Retraining (3-6 Weeks)  []Phase III: Phase III: Muscle Balance and Strengthening (6-12 Weeks)  []Phase IV: Functional Training of the Hip & Lower Extremity (12-18 Weeks)  []Phase V: Advanced Training  Specificity for Return to Sport and/or Work (18-24 Virtual Expert Clinics)    Non-Operative & Pre-Operative Rehabilitation:    Cardiovascular:            Deep Hip Rotators  [x] Upright Bike (Baseline)           [x] External Rotators AROM in 4PK (Baseline)  [x] Walking (Progression 1)           [x] Internal Rotators AROM in 4PK (Baseline)  [] Running (Progression 2)           [x] ER in side lying (Progression 1)  [] Dynamic Loading (Progression 3)          [x] IR in side lying (Progression 1)                [] ER with resistance in 4PK (Progression 2)                [] IR with resistance in 4PK (Progression 2)                 [] Lateral Step Up (Progression 3)    Positioning:  [x] 4PK with pelvic tilts (Baseline)  [x] Pelvic tilts in sitting (Progression 1)      Core:   [x] Relaxation Breathing (Baseline)         [x] [place] lying elbow presses (Progression 1)  [x] Side Planks (Baseline)         [x] Side planks + hip abduction (Progression 1)  [x] Bird-Dog (Baseline)            [x] Bird-Dog with resistance (Progression 1)    Glute Complex:            Load Transfer:  [x] Bridging (Baseline)                 [x] Weight Shifting (Baseline)     [x] Single Leg Bridge (Progression 1)        [x] Single Leg Stance to SEBT(Progression 1)     [] Single Leg Lower (Progression 2)         [] Hip hinge + monster walks (Progression 2)       Mobility:  [x] Pineda position with breathing (baseline)  [x] Hip Hinge with stick & neutral spine (baseline)  [x] Sit to stand (baseline)  [x] Hip Hinge without guidance (Progression 1)  [] Hip Hinge with resisted punch out guidance (Progression 2)      Pelvic Floor:  [] Relaxation breathing         Activities:       [] Rowing       [] Stepper/Exercise bike     [] Swimming  [] Water exercises    Modalities:     Return to Sport:  [x] Of Choice      [] Plyometrics  [] Ultrasound     [] Rhythmic stabilization  [] Iontophoresis    [] Core strengthening   [] Moist heat     [] Sports specific program:   [] Massage         [x] Cryotherapy      [] Electrical stimulation     [] Paraffin  [] Whirlpool  [] TENS    [x] Home exercise program (copy to patient).         Perform exercises for:   15     minutes    3      times/day  [x] Supervised physical therapy  Frequency: []  1x week  [x] 2x week  [] 3x week  [] Other:   Duration: [] 2 weeks   [] 4 weeks  [x] 6 weeks  [] Other:     Additional Instructions:     Glut Max Trigger point therapy  Program for sciatica / LSP program     Sincerely,    Jayla Hill MD [facility]  Orthopaedic Surgeon - Hip Preservation & Sports Medicine   [address] [facility] [address] [place] [address], [facility]  Email: [email or web address]  Office: [phone]    06/22/22  3:28 PM

## 2022-06-22 NOTE — FLOWSHEET NOTE
[address] [facility],[address], [address] [facility], [address]  Phone: [phone]   Fax: [phone]    Physical Therapy Treatment Note/ Progress Report:       Date:  2022    Patient Name:  Alina Yeboah    :  1990  MRN: [id number]  Restrictions/Precautions:    Medical/Treatment Diagnosis Information:  Diagnosis: Hip pain - M25.559, Femoroacetabular impingement of both hips - M25.851, M25.852  Treatment Diagnosis: decreased fn mobility, strength, ROM, ability to ambulate, balance and high-level iADLs and increased pain  Insurance/Certification information:  PT Insurance Information: [facility]  Physician Information:  Dr. Genevieve Humphries  Has the plan of care been signed (Y/N):        []  Yes  [x]  No     Date of Patient follow up with Physician: 2022       Is this a Progress Report:     []  Yes  [x]  No        Progress report/ Recertification will be due (10 Rx or 30 days whichever is less): 2022      Visit # Insurance Allowable Auth Required   5 AUTH 30/year [x]  Yes []  No        Functional Scale:  FOTO LEFS  6/2 - 47/100       Latex Allergy:  [x]NO      []YES  Preferred Language for Healthcare:   [x]English       []other:      Pain level:  R: 3/10 L: 0/10      SUBJECTIVE:  Patient states she is actually better. She states the groin pain is gone, but still has a little sciatica pain. She states she sees the MD after today's appt. She states she is doing the HEP, which makes her feel better, but feels soreness.      OBJECTIVE: See eval     Observation:    Test measurements:      RESTRICTIONS/PRECAUTIONS: none     Exercises/Interventions:       ROM/STRETCHES     Bike 5'  Added    Supine hamstring  30 x 3    Seated piriformis     Supine piriformis     Supine figure 4     Supine sciatic nerve glide 3 x 10 pumps Added    Quad       ITB     Butterfly     Hip flexor stretch kneeling     SKTC 10 sec x 5  Added  PREs     TA brace 10 x 5\"   ^6/9   TA brace with marches 10 x 2  Added 6/22   Prone glute set 10x10\"    SLR     abduction 10 x 10\" iso blue strap Added 6/2   Hip extension - flexed EOB 10 x 3  Added 6/7   Adduction ball squeeze 10x10\" iso Added on 6/13   Supine abduction with tband     Seated hip flexion 10 x 3 Added 6/9   Seated hip flexion + LAQ Clams      SL dead lift     Monster walks     Wall sit with tband     bridges     Quadruped opposite LE/UE reaches     Sit to stands  Attempted but increased pain   Quad sets  Added 6/2        Leg press 10 x 3 100# Added 6/22   Hamstring curls 10 x 3 80# Added 6/22             Manual interventions     STM to R hip flexor      Therapeutic Exercise and NMR EXR  [x] (35286) Provided verbal/tactile cueing for activities related to strengthening, flexibility, endurance, ROM for improvements in LE, proximal hip, and core control with self care, mobility, lifting, ambulation. [x] (13034) Provided verbal/tactile cueing for activities related to improving balance, coordination, kinesthetic sense, posture, motor skill, proprioception  to assist with LE, proximal hip, and core control in self care, mobility, lifting, ambulation and eccentric single leg control.      NMR and Therapeutic Activities:    [x] (04229 or 33178) Provided verbal/tactile cueing for activities related to improving balance, coordination, kinesthetic sense, posture, motor skill, proprioception and motor activation to allow for proper function of core, proximal hip and LE with self care and ADLs  [] (07907) Gait Re-education- Provided training and instruction to the patient for proper LE, core and proximal hip recruitment and positioning and eccentric body weight control with ambulation re-education including up and down stairs     Home Exercise Program:    [x] (49733) Reviewed/Progressed HEP activities related to strengthening, flexibility, endurance, ROM of core, proximal hip and LE for functional self-care, mobility, lifting and ambulation/stair navigation   [] (02025)Reviewed/Progressed HEP activities related to improving balance, coordination, kinesthetic sense, posture, motor skill, proprioception of core, proximal hip and LE for self care, mobility, lifting, and ambulation/stair navigation      Access Code [email or web address]  Updated 6/7    Manual Treatments:  PROM / STM / Oscillations-Mobs:  G-I, II, III, IV (PA's, Inf., Post.)  [] (71304) Provided manual therapy to mobilize LE, proximal hip and/or LS spine soft tissue/joints for the purpose of modulating pain, promoting relaxation,  increasing ROM, reducing/eliminating soft tissue swelling/inflammation/restriction, improving soft tissue extensibility and allowing for proper ROM for normal function with self care, mobility, lifting and ambulation. Modalities:     [] GAME READY (VASO)- for significant edema, swelling, pain control. Charges:  Timed Code Treatment Minutes: 48'    Total Treatment Minutes: 62'    [] EVAL (LOW) 35222 (typically 20 minutes face-to-face)  [] EVAL (MOD) 90810 (typically 30 minutes face-to-face)  [] EVAL (HIGH) 30863 (typically 45 minutes face-to-face)  [] RE-EVAL     [x] AO(50772) x 2   [] IONTO  [x] NMR (87512) x 1   [] VASO  [] Manual (82270) x     [] Other:  [x] TA x   1   [] Mech Traction (41938)  [] ES(attended) (90386)      [] ES (un) (60644):          ASSESSMENT:      GOALS:   Patient stated goal: return to work activities without pain, walk/play with dog without pain; to be able to stand and walk for a long period of time. [] Progressing: [] Met: [] Not Met: [] Adjusted    Therapist goals for Patient:   Short Term Goals: To be achieved in: 2 weeks  1. Independent in HEP and progression per patient tolerance, in order to prevent re-injury. [] Progressing: [] Met: [] Not Met: [] Adjusted   2. Patient will have a decrease in pain to facilitate improvement in movement, function, and ADLs as indicated by Functional Deficits.     [] Progressing: [] Met: [] Not Met: [] Adjusted    Long Term Goals: To be achieved in: 8 weeks  1. Disability index score of 67% or less for the FOTO LEFS to assist with reaching prior level of function. [] Progressing: [] Met: [] Not Met: [] Adjusted  2. Patient will demonstrate increased Hip flex AROM to 120 without pain to allow for proper joint functioning as indicated by patients Functional Deficits. [] Progressing: [] Met: [] Not Met: [] Adjusted  3. Patient will demonstrate an increase in Strength to good proximal hip strength and control to at least 4+/5 in all directions allow for proper functional mobility as indicated by patients Functional Deficits. [] Progressing: [] Met: [] Not Met: [] Adjusted  4. Patient will return to work related functional activities without increased symptoms or restriction. [] Progressing: [] Met: [] Not Met: [] Adjusted  5. Pt reports ability to walk and play with dog without limitation. [] Progressing: [] Met: [] Not Met: [] Adjusted         Overall Progression Towards Functional goals/ Treatment Progress Update:  [] Patient is progressing as expected towards functional goals listed. [] Progression is slowed due to complexities/Impairments listed. [] Progression has been slowed due to co-morbidities. [x] Plan just implemented, too soon to assess goals progression <30days   [] Goals require adjustment due to lack of progress  [] Patient is not progressing as expected and requires additional follow up with physician  [] Other    Prognosis for POC: [x] Good [] Fair  [] Poor      Patient requires continued skilled intervention: [x] Yes  [] No    Treatment/Activity Tolerance:  [x] Patient tolerated treatment well [] Patient limited by fatique  [] Patient limited by pain  [] Patient limited by other medical complications  [x] Other: Patient micki session well. She micki progression of TA bracing, requiring min VCs to improve activation with alternating marches.  She micki the addition of leg press and hamstring curls pain free. Will follow-up next week after MD appt for re-assessment. . 6/22      Patient education: Patient education on PT and plan of care including diagnosis, prognosis, treatment goals and options. Patient agrees with discussed POC and treatment and is aware of rehab process. 6/2      PLAN: See eval 6/2  [] Continue per plan of care [] Alter current plan (see comments above)  [x] Plan of care initiated [] Hold pending MD visit [] Discharge      Electronically signed by: Otis Buchanan, PT, DPT         Note: If patient does not return for scheduled/ recommended follow up visits, this note will serve as a discharge from care along with most recent update on progress.

## 2022-06-27 ENCOUNTER — HOSPITAL ENCOUNTER (OUTPATIENT)
Dept: PHYSICAL THERAPY | Age: 32
Setting detail: THERAPIES SERIES
Discharge: HOME | End: 2022-06-27
Payer: COMMERCIAL

## 2022-06-27 PROCEDURE — 97112 NEUROMUSCULAR REEDUCATION: CPT | Performed by: PHYSICAL THERAPIST

## 2022-06-27 PROCEDURE — 97530 THERAPEUTIC ACTIVITIES: CPT | Performed by: PHYSICAL THERAPIST

## 2022-06-27 PROCEDURE — 97110 THERAPEUTIC EXERCISES: CPT | Performed by: PHYSICAL THERAPIST

## 2022-06-27 NOTE — FLOWSHEET NOTE
[facility], [address] [place], [address]  Phone: [phone]   Fax: [phone]    Physical Therapy Treatment Note/ Progress Report:       Date:  2022    Patient Name:  Isabella De La O    :  1990  MRN: [id number]  Restrictions/Precautions:    Medical/Treatment Diagnosis Information:  Diagnosis: Hip pain - M25.559, Femoroacetabular impingement of both hips - M25.851, M25.852  Treatment Diagnosis: decreased fn mobility, strength, ROM, ability to ambulate, balance and high-level iADLs and increased pain  Insurance/Certification information:  PT Insurance Information: [facility]  Physician Information:  Dr. Guevara Rg  Has the plan of care been signed (Y/N):        []  Yes  [x]  No     Date of Patient follow up with Physician: 2022       Is this a Progress Report:     []  Yes  [x]  No        Progress report/ Recertification will be due (10 Rx or 30 days whichever is less): 2022      Visit # Insurance Allowable Auth Required   /year [x]  Yes []  No        Functional Scale:  FOTO LEFS  6/ - 47/100       Latex Allergy:  [x]NO      []YES  Preferred Language for Healthcare:   [x]English       []other:      Pain level:  R: 3/10 L: 0/10      SUBJECTIVE:  Patient states she went to see the MD, who wants her to work on more of the sciatic pain with PT. She states she has not had any pain.       OBJECTIVE: See miya     Observation:    Test measurements:      RESTRICTIONS/PRECAUTIONS: none     Exercises/Interventions:       ROM/STRETCHES     Bike 5'  Added    Supine hamstring  30 x 3    Seated piriformis     Supine piriformis 30 sec x 3 B  Added    Supine figure 4     Supine sciatic nerve glide 3 x 10 pumps Added    Quad       ITB     Butterfly     Hip flexor stretch kneeling     SKTC 10 sec x 5  Added         PREs     TA brace ^   TA brace with marches Added    Prone glute set    SLR abduction Added 6/2   Hip extension - flexed EOB 10 x 3  Added 6/7   Adduction ball squeeze Added on 6/13   Supine abduction with tband    Seated hip flexion Added 6/9   Seated hip flexion + LAQ Clams  10 x 3 B  Added 6/27        Opposite alternating UE/LE prone lifts 10 x 4  Added 6/27        SL dead lift     Monster walks     Wall sit with tband     bridges     Quadruped opposite LE/UE reaches     Sit to stands  Attempted but increased pain   Quad sets  Added 6/2        Leg press 10 x 3 100# Added 6/22   Hamstring curls 10 x 3 80# Added 6/22             Manual interventions     STM to R hip flexor      Therapeutic Exercise and NMR EXR  [x] (64960) Provided verbal/tactile cueing for activities related to strengthening, flexibility, endurance, ROM for improvements in LE, proximal hip, and core control with self care, mobility, lifting, ambulation. [x] (87251) Provided verbal/tactile cueing for activities related to improving balance, coordination, kinesthetic sense, posture, motor skill, proprioception  to assist with LE, proximal hip, and core control in self care, mobility, lifting, ambulation and eccentric single leg control.      NMR and Therapeutic Activities:    [x] (83452 or 20306) Provided verbal/tactile cueing for activities related to improving balance, coordination, kinesthetic sense, posture, motor skill, proprioception and motor activation to allow for proper function of core, proximal hip and LE with self care and ADLs  [] (28909) Gait Re-education- Provided training and instruction to the patient for proper LE, core and proximal hip recruitment and positioning and eccentric body weight control with ambulation re-education including up and down stairs     Home Exercise Program:    [x] (10873) Reviewed/Progressed HEP activities related to strengthening, flexibility, endurance, ROM of core, proximal hip and LE for functional self-care, mobility, lifting and ambulation/stair navigation   [] (34151)Reviewed/Progressed HEP activities related to improving balance, coordination, kinesthetic sense, posture, motor skill, proprioception of core, proximal hip and LE for self care, mobility, lifting, and ambulation/stair navigation      Access Code [email or web address]  Updated 6/7    Manual Treatments:  PROM / STM / Oscillations-Mobs:  G-I, II, III, IV (PA's, Inf., Post.)  [] (85262) Provided manual therapy to mobilize LE, proximal hip and/or LS spine soft tissue/joints for the purpose of modulating pain, promoting relaxation,  increasing ROM, reducing/eliminating soft tissue swelling/inflammation/restriction, improving soft tissue extensibility and allowing for proper ROM for normal function with self care, mobility, lifting and ambulation. Modalities:     [] GAME READY (VASO)- for significant edema, swelling, pain control. Charges:  Timed Code Treatment Minutes: 45'   Total Treatment Minutes: 48'    [] EVAL (LOW) 89304 (typically 20 minutes face-to-face)  [] EVAL (MOD) 27745 (typically 30 minutes face-to-face)  [] EVAL (HIGH) 88298 (typically 45 minutes face-to-face)  [] RE-EVAL     [x] UG(24337) x 1   [] IONTO  [x] NMR (07914) x 1   [] VASO  [] Manual (35002) x     [] Other:  [x] TA x   1   [] Mech Traction (32665)  [] ES(attended) (16023)      [] ES (un) (75656):          ASSESSMENT:      GOALS:   Patient stated goal: return to work activities without pain, walk/play with dog without pain; to be able to stand and walk for a long period of time. [] Progressing: [] Met: [] Not Met: [] Adjusted    Therapist goals for Patient:   Short Term Goals: To be achieved in: 2 weeks  1. Independent in HEP and progression per patient tolerance, in order to prevent re-injury. [] Progressing: [] Met: [] Not Met: [] Adjusted   2. Patient will have a decrease in pain to facilitate improvement in movement, function, and ADLs as indicated by Functional Deficits.     [] Progressing: [] Met: [] Not Met: [] Adjusted    Long Term Goals: To be achieved in: 8 weeks  1. Disability index score of 67% or less for the FOTO LEFS to assist with reaching prior level of function. [] Progressing: [] Met: [] Not Met: [] Adjusted  2. Patient will demonstrate increased Hip flex AROM to 120 without pain to allow for proper joint functioning as indicated by patients Functional Deficits. [] Progressing: [] Met: [] Not Met: [] Adjusted  3. Patient will demonstrate an increase in Strength to good proximal hip strength and control to at least 4+/5 in all directions allow for proper functional mobility as indicated by patients Functional Deficits. [] Progressing: [] Met: [] Not Met: [] Adjusted  4. Patient will return to work related functional activities without increased symptoms or restriction. [] Progressing: [] Met: [] Not Met: [] Adjusted  5. Pt reports ability to walk and play with dog without limitation. [] Progressing: [] Met: [] Not Met: [] Adjusted         Overall Progression Towards Functional goals/ Treatment Progress Update:  [] Patient is progressing as expected towards functional goals listed. [] Progression is slowed due to complexities/Impairments listed. [] Progression has been slowed due to co-morbidities. [x] Plan just implemented, too soon to assess goals progression <30days   [] Goals require adjustment due to lack of progress  [] Patient is not progressing as expected and requires additional follow up with physician  [] Other    Prognosis for POC: [x] Good [] Fair  [] Poor      Patient requires continued skilled intervention: [x] Yes  [] No    Treatment/Activity Tolerance:  [x] Patient tolerated treatment well [] Patient limited by fatique  [] Patient limited by pain  [] Patient limited by other medical complications  [x] Other: Patient micki session well. She demonstrated proper form with the addition of clamshells and opposite UE/LE prone lifts. She reported muscular soreness during the session.  Will follow-up next week after MD appt for re-assessment. 6/27      Patient education: Patient education on PT and plan of care including diagnosis, prognosis, treatment goals and options. Patient agrees with discussed POC and treatment and is aware of rehab process. 6/2      PLAN: See eval 6/2  [] Continue per plan of care [] Alter current plan (see comments above)  [x] Plan of care initiated [] Hold pending MD visit [] Discharge      Electronically signed by: Maury Camacho, PT, DPT         Note: If patient does not return for scheduled/ recommended follow up visits, this note will serve as a discharge from care along with most recent update on progress.

## 2022-06-29 DIAGNOSIS — E11.65 UNCONTROLLED TYPE 2 DIABETES MELLITUS WITH HYPERGLYCEMIA (HCC): ICD-10-CM

## 2022-06-29 RX ORDER — ALBUTEROL SULFATE 90 UG/1
2 AEROSOL, METERED RESPIRATORY (INHALATION) EVERY 6 HOURS PRN
Qty: 18 G | Refills: 1 | Status: SHIPPED | OUTPATIENT
Start: 2022-06-29

## 2022-06-29 RX ORDER — METFORMIN HYDROCHLORIDE 500 MG/1
TABLET, EXTENDED RELEASE ORAL
Qty: 90 TABLET | Refills: 1 | Status: SHIPPED | OUTPATIENT
Start: 2022-06-29 | End: 2022-09-12 | Stop reason: SDUPTHER

## 2022-06-29 NOTE — TELEPHONE ENCOUNTER
Last appointment: 6/14/2022  Next appointment: 9/12/2022  Last refill: Metformin 9/9/2021  #90 x1, Albuterol 3/23/2022  # 18g x1    Patient states due to the weather she has been using her albuterol more frequently

## 2022-07-05 ENCOUNTER — HOSPITAL ENCOUNTER (OUTPATIENT)
Dept: PHYSICAL THERAPY | Age: 32
Setting detail: THERAPIES SERIES
Discharge: HOME | End: 2022-07-05
Payer: COMMERCIAL

## 2022-07-05 NOTE — FLOWSHEET NOTE
[facility]       Physical Therapy   Phone: [phone]   Fax: [phone]      Physical Therapy  Cancellation/No-show Note  Patient Name:  Demian Doe  :  1990   Date:  2022  Cancelled visits to date: 2  No-shows to date:     For today's appointment patient:  [x]  Cancelled  []  Rescheduled appointment  []  No-show     Reason given by patient:  []  Patient ill  []  Conflicting appointment  []  No transportation    []  Conflict with work  []  No reason given  []  Other:     Comments:  Cancelled due to still being out of town.      Electronically signed by:  Prema Easton PT, DPT

## 2022-07-12 ENCOUNTER — HOSPITAL ENCOUNTER (OUTPATIENT)
Dept: PHYSICAL THERAPY | Age: 32
Setting detail: THERAPIES SERIES
Discharge: HOME | End: 2022-07-12
Payer: COMMERCIAL

## 2022-07-12 PROCEDURE — 97110 THERAPEUTIC EXERCISES: CPT | Performed by: PHYSICAL THERAPIST

## 2022-07-12 NOTE — PLAN OF CARE
[address] [facility], [address] [place], [facility]  Phone: [phone]   Fax: [phone]     Physical Therapy Discharge Summary    Dear Dr. Otis Carvalho,    We had the pleasure of treating the following patient for physical therapy services at [address] [place] [address]. A summary of our findings can be found in the discharge summary below. If you have any questions or concerns regarding these findings, please do not hesitate to contact me at the office phone number checked above. Thank you for the referral.     Physician Signature:________________________________Date:__________________  By signing above (or electronic signature), therapists plan is approved by physician      Overall Response to Treatment:   [x]Patient is responding well to treatment and improvement is noted with regards  to goals   []Patient should continue to improve in reasonable time if they continue HEP   []Patient has plateaued and is no longer responding to skilled PT intervention    []Patient is getting worse and would benefit from return to referring MD   []Patient unable to adhere to initial POC   [x]Other: *Pt returns to PT with improved symptoms. She demonstrates improved B hip ROM and strength. She has returned to work and playing with her dog pain free. She is independent with her HEP and educated on the importance of continued routine to prevent future injury. She has met all goals, except hip flex ROM, which she will continue to progress towards with HEP. Therefore, pt is being D/C to HEP at this time. Educated to call with any questions concerns.     Date range of Visits: 22 - 22    Total Visits: 7      Physical Therapy Treatment Note/ Progress Report:       Date:  2022    Patient Name:  Nilda Parisi    :  1990  MRN: [id number]  Restrictions/Precautions:    Medical/Treatment Diagnosis Information:  Diagnosis: Hip pain - M25.559, Femoroacetabular impingement of both hips - M25.851, M25.852  Treatment Diagnosis: decreased fn mobility, strength, ROM, ability to ambulate, balance and high-level iADLs and increased pain  Insurance/Certification information:  PT Insurance Information: [facility]  Physician Information:  Dr. Nicole Calderón  Has the plan of care been signed (Y/N):        []  Yes  [x]  No     Date of Patient follow up with Physician: 16 June 2022       Is this a Progress Report:     [x]  Yes  []  No        Visit # Insurance Allowable Auth Required   7 AUTH 30/year [x]  Yes []  No        Functional Scale:  FOTO LEFS  6/2 - 47/100  7/12 - 99/100       Latex Allergy:  [x]NO      []YES  Preferred Language for Healthcare:   [x]English       []other:      Pain level:  R: 0/10 L: 0/10  7/12    SUBJECTIVE:  Patient states she has not had any hip or sciatic pain. She states she has been doing the HEP at home. . 7/12    OBJECTIVE: 7/12   Observation:    Test measurements:            ROM AROM Comments     Left Right     Flexion 118 115     Extension         Abduction 34 38      Adduction         ER 39 37     IR 28 35                   Strength Left Right Comments   Hip flexors 4/5 4/5      Hip extension 5/5 5/5      Hip abduction 4+/5 4+/5              Hip ER 4+/5 4+/5     Hip IR 4+/5 4+/5     Quads 5/5 4+/5     Hamstrings 4+/5 4+/5        Joint mobility: All directions in R hip.              []?Normal                      [x]? Hypo              []?Hyper     Palpation: No TTP     Functional Mobility/Transfers: WNL     Posture:  WNL        Gait: (include devices/WB status) Minor hip drop R>L                           RESTRICTIONS/PRECAUTIONS: none     Exercises/Interventions:       ROM/STRETCHES     Bike 5'  Added 6/7   Supine hamstring  30 x 3    Seated piriformis     Supine piriformis 30 sec x 3 B  Added 6/27   Supine figure 4     Supine sciatic nerve glide 3 x 10 pumps Added 6/13   Quad       ITB     Butterfly     Hip flexor stretch kneeling SKTC 10 sec x 5  Added 6/2        PREs     TA brace ^6/9   TA brace with marches Added 6/22   Prone glute set    SLR    abduction Added 6/2   Hip extension - flexed EOB Added 6/7   Adduction ball squeeze Added on 6/13   Supine abduction with tband    Seated hip flexion Added 6/9   Seated hip flexion + LAQ Clams  Added 6/27       Opposite alternating UE/LE prone lifts 10 x 4  Added 6/27        SL dead lift     Monster walks     Wall sit with tband     bridges     Quadruped opposite LE/UE reaches     Sit to stands  Attempted but increased pain   Quad sets  Added 6/2        Leg press Added 6/22   Hamstring curls Added 6/22             Manual interventions     STM to R hip flexor      Therapeutic Exercise and NMR EXR  [x] (59790) Provided verbal/tactile cueing for activities related to strengthening, flexibility, endurance, ROM for improvements in LE, proximal hip, and core control with self care, mobility, lifting, ambulation. [x] (82789) Provided verbal/tactile cueing for activities related to improving balance, coordination, kinesthetic sense, posture, motor skill, proprioception  to assist with LE, proximal hip, and core control in self care, mobility, lifting, ambulation and eccentric single leg control.      NMR and Therapeutic Activities:    [x] (14974 or 98758) Provided verbal/tactile cueing for activities related to improving balance, coordination, kinesthetic sense, posture, motor skill, proprioception and motor activation to allow for proper function of core, proximal hip and LE with self care and ADLs  [] (10620) Gait Re-education- Provided training and instruction to the patient for proper LE, core and proximal hip recruitment and positioning and eccentric body weight control with ambulation re-education including up and down stairs     Home Exercise Program:    [x] (40825) Reviewed/Progressed HEP activities related to strengthening, flexibility, endurance, ROM of core, proximal hip and LE for functional self-care, mobility, lifting and ambulation/stair navigation   [] (67333)Reviewed/Progressed HEP activities related to improving balance, coordination, kinesthetic sense, posture, motor skill, proprioception of core, proximal hip and LE for self care, mobility, lifting, and ambulation/stair navigation      Access Code [email or web address]  Updated 6/7    Manual Treatments:  PROM / STM / Oscillations-Mobs:  G-I, II, III, IV (PA's, Inf., Post.)  [] (03786) Provided manual therapy to mobilize LE, proximal hip and/or LS spine soft tissue/joints for the purpose of modulating pain, promoting relaxation,  increasing ROM, reducing/eliminating soft tissue swelling/inflammation/restriction, improving soft tissue extensibility and allowing for proper ROM for normal function with self care, mobility, lifting and ambulation. Modalities:     [] GAME READY (VASO)- for significant edema, swelling, pain control. Charges:  Timed Code Treatment Minutes: 25'    Total Treatment Minutes: 30'    [] EVAL (LOW) 455 1011 (typically 20 minutes face-to-face)  [] EVAL (MOD) 62883 (typically 30 minutes face-to-face)  [] EVAL (HIGH) 28556 (typically 45 minutes face-to-face)  [] RE-EVAL     [x] AU(87343) x 2   [] IONTO  [] NMR (21350) x   [] VASO  [] Manual (37192) x     [] Other:  [] TA x     [] Mech Traction (21800)  [] ES(attended) (35591)      [] ES (un) (43050):          ASSESSMENT:      GOALS: 7/12  Patient stated goal: return to work activities without pain, walk/play with dog without pain; to be able to stand and walk for a long period of time. [] Progressing: [x] Met: [] Not Met: [] Adjusted    Therapist goals for Patient:   Short Term Goals: To be achieved in: 2 weeks  1. Independent in HEP and progression per patient tolerance, in order to prevent re-injury. [] Progressing: [x] Met: [] Not Met: [] Adjusted   2.  Patient will have a decrease in pain to facilitate improvement in movement, function, and ADLs as indicated by Functional Deficits. [] Progressing: [x] Met: [] Not Met: [] Adjusted    Long Term Goals: To be achieved in: 8 weeks  1. Disability index score of 67% or less for the FOTO LEFS to assist with reaching prior level of function. [] Progressing: [x] Met: [] Not Met: [] Adjusted  2. Patient will demonstrate increased Hip flex AROM to 120 without pain to allow for proper joint functioning as indicated by patients Functional Deficits. [x] Progressing: [] Met: [] Not Met: [] Adjusted  3. Patient will demonstrate an increase in Strength to good proximal hip strength and control to at least 4+/5 in all directions allow for proper functional mobility as indicated by patients Functional Deficits. [] Progressing: [x] Met: [] Not Met: [] Adjusted  4. Patient will return to work related functional activities without increased symptoms or restriction. [] Progressing: [x] Met: [] Not Met: [] Adjusted  5. Pt reports ability to walk and play with dog without limitation. [] Progressing: [x] Met: [] Not Met: [] Adjusted         Overall Progression Towards Functional goals/ Treatment Progress Update:  [x] Patient is progressing as expected towards functional goals listed. [] Progression is slowed due to complexities/Impairments listed. [] Progression has been slowed due to co-morbidities. [] Plan just implemented, too soon to assess goals progression <30days   [] Goals require adjustment due to lack of progress  [] Patient is not progressing as expected and requires additional follow up with physician  [] Other    Prognosis for POC: [x] Good [] Fair  [] Poor      Patient requires continued skilled intervention: [x] Yes  [] No    Treatment/Activity Tolerance:  [x] Patient tolerated treatment well [] Patient limited by fatique  [] Patient limited by pain  [] Patient limited by other medical complications  [x] Other: Pt returns to PT with improved symptoms. She demonstrates improved B hip ROM and strength.  She has returned to work and playing with her dog pain free. She is independent with her HEP and educated on the importance of continued routine to prevent future injury. She has met all goals, except hip flex ROM, which she will continue to progress towards with HEP. Therefore, pt is being D/C to HEP at this time. Educated to call with any questions concerns. 7/12      Patient education: Patient education on PT and plan of care including diagnosis, prognosis, treatment goals and options. Patient agrees with discussed POC and treatment and is aware of rehab process. 6/2      PLAN: 7/12  [] Continue per plan of care [] Alter current plan (see comments above)  [] Plan of care initiated [] Hold pending MD visit [x] Discharge      Electronically signed by: Nikolas Espinoza PT, DPT         Note: If patient does not return for scheduled/ recommended follow up visits, this note will serve as a discharge from care along with most recent update on progress.

## 2022-07-14 ENCOUNTER — HOSPITAL ENCOUNTER (OUTPATIENT)
Dept: CT IMAGING | Age: 32
Discharge: HOME | End: 2022-07-14
Payer: COMMERCIAL

## 2022-07-14 DIAGNOSIS — J98.4 CAVITARY LESION OF LUNG: ICD-10-CM

## 2022-07-14 DIAGNOSIS — J98.4 CAVITARY LESION OF LUNG: Primary | ICD-10-CM

## 2022-07-14 PROCEDURE — 71250 CT THORAX DX C-: CPT

## 2022-07-19 ENCOUNTER — APPOINTMENT (OUTPATIENT)
Dept: PHYSICAL THERAPY | Age: 32
End: 2022-07-19
Payer: COMMERCIAL

## 2022-08-02 ENCOUNTER — HOSPITAL ENCOUNTER (EMERGENCY)
Age: 32
Discharge: HOME | End: 2022-08-02
Payer: COMMERCIAL

## 2022-08-02 VITALS
HEART RATE: 83 BPM | OXYGEN SATURATION: 97 % | DIASTOLIC BLOOD PRESSURE: 76 MMHG | BODY MASS INDEX: 44.15 KG/M2 | HEIGHT: 65 IN | TEMPERATURE: 98 F | RESPIRATION RATE: 18 BRPM | WEIGHT: 264.99 LBS | SYSTOLIC BLOOD PRESSURE: 128 MMHG

## 2022-08-02 DIAGNOSIS — K08.89 DENTALGIA: ICD-10-CM

## 2022-08-02 DIAGNOSIS — F17.200 SMOKER: ICD-10-CM

## 2022-08-02 DIAGNOSIS — K02.9 DENTAL CARIES: ICD-10-CM

## 2022-08-02 DIAGNOSIS — S39.012A STRAIN OF LUMBAR REGION, INITIAL ENCOUNTER: Primary | ICD-10-CM

## 2022-08-02 LAB
BACTERIA: ABNORMAL /HPF
BILIRUBIN URINE: NEGATIVE
BLOOD, URINE: NEGATIVE
CLARITY: ABNORMAL
COLOR: YELLOW
EPITHELIAL CELLS, UA: 21 /HPF (ref 0–5)
GLUCOSE URINE: NEGATIVE MG/DL
HYALINE CASTS: 1 /LPF (ref 0–8)
KETONES, URINE: NEGATIVE MG/DL
LEUKOCYTE ESTERASE, URINE: ABNORMAL
MICROSCOPIC EXAMINATION: YES
NITRITE, URINE: NEGATIVE
PH UA: 5.5 (ref 5–8)
PROTEIN UA: ABNORMAL MG/DL
RBC UA: 3 /HPF (ref 0–4)
SPECIFIC GRAVITY UA: 1.01 (ref 1–1.03)
URINE REFLEX TO CULTURE: YES
URINE TYPE: ABNORMAL
UROBILINOGEN, URINE: 0.2 E.U./DL
WBC UA: 26 /HPF (ref 0–5)

## 2022-08-02 PROCEDURE — 81001 URINALYSIS AUTO W/SCOPE: CPT

## 2022-08-02 PROCEDURE — 6360000002 HC RX W HCPCS

## 2022-08-02 PROCEDURE — 6370000000 HC RX 637 (ALT 250 FOR IP)

## 2022-08-02 PROCEDURE — 87086 URINE CULTURE/COLONY COUNT: CPT

## 2022-08-02 PROCEDURE — 99284 EMERGENCY DEPT VISIT MOD MDM: CPT

## 2022-08-02 PROCEDURE — 96372 THER/PROPH/DIAG INJ SC/IM: CPT

## 2022-08-02 RX ORDER — LIDOCAINE 50 MG/G
1 PATCH TOPICAL EVERY 24 HOURS
Qty: 30 PATCH | Refills: 0 | Status: SHIPPED | OUTPATIENT
Start: 2022-08-02 | End: 2022-09-01

## 2022-08-02 RX ORDER — AMOXICILLIN 500 MG/1
500 CAPSULE ORAL 3 TIMES DAILY
Qty: 21 CAPSULE | Refills: 0 | Status: SHIPPED | OUTPATIENT
Start: 2022-08-02 | End: 2022-08-09

## 2022-08-02 RX ORDER — METHOCARBAMOL 500 MG/1
500 TABLET, FILM COATED ORAL 4 TIMES DAILY PRN
Qty: 20 TABLET | Refills: 0 | Status: SHIPPED | OUTPATIENT
Start: 2022-08-02 | End: 2022-08-07

## 2022-08-02 RX ORDER — METHYLPREDNISOLONE 4 MG/1
TABLET ORAL
Qty: 1 KIT | Refills: 0 | Status: SHIPPED | OUTPATIENT
Start: 2022-08-02 | End: 2022-08-08

## 2022-08-02 RX ORDER — ORPHENADRINE CITRATE 30 MG/ML
60 INJECTION INTRAMUSCULAR; INTRAVENOUS ONCE
Status: COMPLETED | OUTPATIENT
Start: 2022-08-02 | End: 2022-08-02

## 2022-08-02 RX ORDER — CHLORHEXIDINE GLUCONATE 0.12 MG/ML
15 RINSE ORAL 3 TIMES DAILY
Qty: 1 EACH | Refills: 0 | Status: SHIPPED | OUTPATIENT
Start: 2022-08-02 | End: 2022-08-16

## 2022-08-02 RX ORDER — LIDOCAINE 4 G/G
1 PATCH TOPICAL DAILY
Status: DISCONTINUED | OUTPATIENT
Start: 2022-08-02 | End: 2022-08-02 | Stop reason: HOSPADM

## 2022-08-02 RX ORDER — LIDOCAINE HYDROCHLORIDE 20 MG/ML
15 SOLUTION OROPHARYNGEAL ONCE
Status: COMPLETED | OUTPATIENT
Start: 2022-08-02 | End: 2022-08-02

## 2022-08-02 RX ADMIN — LIDOCAINE HYDROCHLORIDE 15 ML: 20 SOLUTION ORAL at 12:28

## 2022-08-02 RX ADMIN — ORPHENADRINE CITRATE 60 MG: 30 INJECTION INTRAMUSCULAR; INTRAVENOUS at 12:29

## 2022-08-02 ASSESSMENT — PAIN DESCRIPTION - ORIENTATION: ORIENTATION: LOWER

## 2022-08-02 ASSESSMENT — ENCOUNTER SYMPTOMS
EYE PAIN: 0
RHINORRHEA: 0
VOMITING: 0
ABDOMINAL PAIN: 0
SHORTNESS OF BREATH: 0
TROUBLE SWALLOWING: 1
BACK PAIN: 1
COUGH: 0
NAUSEA: 0
CONSTIPATION: 0
DIARRHEA: 0
SORE THROAT: 0

## 2022-08-02 ASSESSMENT — PAIN DESCRIPTION - LOCATION: LOCATION: BACK;MOUTH

## 2022-08-02 ASSESSMENT — PAIN - FUNCTIONAL ASSESSMENT: PAIN_FUNCTIONAL_ASSESSMENT: 0-10

## 2022-08-02 ASSESSMENT — PAIN SCALES - GENERAL: PAINLEVEL_OUTOF10: 9

## 2022-08-02 NOTE — Clinical Note
Murray Soares was seen and treated in our emergency department on 8/2/2022. She may return to work on 08/06/2022. If you have any questions or concerns, please don't hesitate to call.       Juan Mirza, APRN - CNP

## 2022-08-02 NOTE — DISCHARGE INSTRUCTIONS
Dental Emergency Referrals    [address] residents only)    [facility] AT [place]  [address] [phone]   [facility].  [phone]   [address] [facility]  [address]  [phone]   [facility]  (entrance on [address] [facility]. [address] [place] [address].)  [address]  [phone]   [facility] [address].  [phone]   [facility]  [address]  [phone]       [address] [facility]  [address] [facility].  [phone]   [facility]  [address]  [phone]     [facility]  [address] [facility] 2nd floor  [phone]   [facility]  [address] [phone] ([address])  [place]: [address] [facility] [address]: [address]  [phone]   [address] [facility]/ [facility] 128  [facility]  [phone]     [facility]  [address]  [phone]   [address]  [address] [facility].  Oral Surgery Dept: [phone]  Dental Clinic: [address]  [facility]  [phone]     [address] [phone]   [facility]   [address] [place], [address]  [place] : [address] : [phone]     [facility]  [address] [facility] [address]    Other [address] in the area    [address] [facility] (Dental Urgent Care)  [facility]  (Across from [address] [facility])  [facility], [address]  [phone]?       Pediatric Only Dentists    [address]Third Floor   Up to age 21  [address] [facility] [address]  Up to age 24  [place]: [facility]   [phone] or [address] [facility] [address]: [address] [place] [address]  Up to age 14  [address] [phone]

## 2022-08-02 NOTE — ED PROVIDER NOTES
[address] [facility]        Pt Name: Neal Rivera  MRN: [id number]  [place] 1990  Date of evaluation: 8/2/2022  Provider: DELORES Golden - CNP  PCP: Toby Mercado MD  Note Started: 12:24 PM EDT      WINIFRED. I have evaluated this patient. My supervising physician was available for consultation. Triage CHIEF COMPLAINT       Chief Complaint   Patient presents with    Back Pain     Lower back pain since this morning. Dental Pain     For two days, painful to chew anything. Dentist appt next month. HISTORY OF PRESENT ILLNESS   (Location/Symptom, Timing/Onset, Context/Setting, Quality, Duration, Modifying Factors, Severity)  Note limiting factors. Chief Complaint: Moris Rivera is a 36 y.o. female who presents to the ED for multiple concerns. 1.  She is concerned she has UTI. Has had ongoing symptoms of frequency and dysuria for the past couple of days. Denies any vaginal bleeding or discharge. Reports a frequent history of UTIs. No fevers or chills. 2.  She has lower back pain which started yesterday. She works at [employer] and has been moving some countertops and furniture. She is taken 1200 mg of ibuprofen without good symptom relief. She specifically denies any red flag signs for the back pain  Neal Rivera specifically denies alcoholism, diabetes, being immunocompromised, spinal injections, spinal surgery, IV drug use, saddle anesthesia or numbness, fevers or infections, recent catheters or urinary procedures, and incontinence  3. Reports dental pain to the right upper molar # 1. Has had ongoing cavity for several weeks and is unable to get into the dentist until September. She is having ongoing pain in the last couple of days. Nursing Notes were all reviewed and agreed with or any disagreements were addressed in the HPI.     REVIEW OF SYSTEMS    (2-9 systems for level 4, 10 or more for level 5)     Review of Systems   Constitutional:  Negative for chills, diaphoresis and fever. HENT:  Positive for dental problem and trouble swallowing (Secondary to pain. ). Negative for congestion, rhinorrhea and sore throat. Eyes:  Negative for pain and visual disturbance. Respiratory:  Negative for cough and shortness of breath. Cardiovascular:  Negative for chest pain and leg swelling. Gastrointestinal:  Negative for abdominal pain, constipation, diarrhea, nausea and vomiting. Genitourinary:  Positive for dysuria and frequency. Negative for hematuria. Musculoskeletal:  Positive for back pain. Negative for neck pain. Skin:  Negative for rash and wound. Neurological:  Negative for dizziness and light-headedness.      PAST MEDICAL HISTORY     Past Medical History:   Diagnosis Date    Asthma     Chronic post-traumatic headache, not intractable 3/3/2021    COVID-19     12-4-2021    Diabetes mellitus ([facility] [address]     Dysmenorrhea     Essential hypertension 8/26/2021    Hyperlipidemia     Lactose intolerance     Menstrual irregularity     Obesity     Tobacco dependence        SURGICAL HISTORY     Past Surgical History:   Procedure Laterality Date    FRACTURE SURGERY      RIGHT ANKLE    HIP SURGERY Bilateral     for dislocation - ? 2006/2007    LAPAROSCOPY N/A 1/12/2022    DIAGNOSTIC LAPAROSCOPY, LYSIS OF ADHESIONS performed by Tracy Coffman MD at [address] [place] [address]       Discharge Medication List as of 8/2/2022  2:04 PM        CONTINUE these medications which have NOT CHANGED    Details   albuterol sulfate HFA (PROAIR HFA) 108 (90 Base) MCG/ACT inhaler Inhale 2 puffs into the lungs every 6 hours as needed for Wheezing or Shortness of Breath Dispense with SPACER and Instruct on use, Disp-18 g, R-1Normal      metFORMIN (GLUCOPHAGE-XR) 500 MG extended release tablet TAKE 1 TABLET BY MOUTH EVERY DAY AT NIGHT, Disp-90 tablet, R-1Normal      gabapentin (NEURONTIN) 100 MG capsule Take 1 capsule by mouth 2 times daily for 60 days.  Intended supply: 30 days, Disp-60 capsule, R-1Normal      Dulaglutide (TRULICITY) 1.5 RF/7.5CX SOPN Inject 1.5 mg into the skin once a week, Disp-4 pen, R-5Normal      amitriptyline (ELAVIL) 25 mg tablet TAKE 1 TABLET BY MOUTH EVERY DAY AT NIGHT, Disp-90 tablet, R-1Normal      triamcinolone (ARISTOCORT) 0.5 % cream APPLY TO AFFECTED AREA TWICE A DAY, Disp-30 g, R-2, Normal      Loratadine-Pseudoephedrine (CLARITIN-D 24 HOUR PO) Take by mouthHistorical Med      sodium chloride (ALTAMIST SPRAY) 0.65 % nasal spray 1 spray by Nasal route every 2 hours as needed for Congestion, Disp-88 each, R-5Normal      vitamin D (CHOLECALCIFEROL) 50 MCG (2000 UT) TABS tablet TAKE 1 TABLET BY MOUTH EVERY DAY, Disp-30 tablet, R-11Normal      fluticasone-salmeterol (ADVAIR DISKUS) 250-50 MCG/DOSE AEPB Inhale 1 puff into the lungs every 12 hours, Disp-60 each, R-1Normal      NIFEdipine (ADALAT CC) 30 MG extended release tablet TAKE 1 TABLET BY MOUTH EVERY DAY, Disp-90 tablet, R-1Normal      OneTouch Delica Lancets 59M MISC 1 each by Does not apply route daily, Disp-100 each, R-11Normal      Lancet Devices (ONE TOUCH DELICA LANCING DEV) MISC DAILY Starting Tue 3/2/2021, Disp-1 each, R-0, Normal      Blood Glucose Monitoring Suppl (ONE TOUCH ULTRA 2) w/Device KIT DAILY Starting Tue 3/2/2021, Disp-1 kit, R-0, Normal      blood glucose test strips (ONETOUCH ULTRA) strip 1 each by In Vitro route daily, Disp-100 each, R-11Normal             ALLERGIES     Codeine and Lactose    FAMILYHISTORY       Family History   Problem Relation Age of Onset    Cancer Father     Hypertension Father     Hypertension Maternal Aunt         SOCIAL HISTORY       Social History     Socioeconomic History    Marital status: Single   Tobacco Use    Smoking status: Every Day     Packs/day: 0.10     Years: 5.00     Pack years: 0.50     Types: Cigarettes     Last attempt to quit: 3/26/2018     Years since quittin.3    Smokeless tobacco: Never   Vaping Use    Vaping Use: Never used   Substance and Sexual Activity    Alcohol use: Yes     Alcohol/week: 0.8 standard drinks     Types: 1 Standard drinks or equivalent per week     Comment: once a week    Drug use: No    Sexual activity: Yes     Partners: Male       SCREENINGS    [place] Coma Scale  Eye Opening: Spontaneous  Best Verbal Response: Oriented  Best Motor Response: Obeys commands  [place] Coma Scale Score: 15        PHYSICAL EXAM    (up to 7 for level 4, 8 or more for level 5)     ED Triage Vitals [22 1203]   BP Temp Temp Source Heart Rate Resp SpO2 Height Weight   128/76 98 °F (36.7 °C) Oral 83 18 97 % 5' 5\" (1.651 m) 264 lb 15.9 oz (120.2 kg)       Physical Exam  Vitals and nursing note reviewed. Constitutional:       Appearance: Normal appearance. She is obese. She is not ill-appearing, toxic-appearing or diaphoretic. HENT:      Head: Normocephalic and atraumatic. Right Ear: External ear normal.      Left Ear: External ear normal.      Nose: Nose normal. No congestion or rhinorrhea. Mouth/Throat:      Mouth: Mucous membranes are moist.      Dentition: Abnormal dentition. Dental tenderness and dental caries present. No dental abscesses. Pharynx: Oropharynx is clear. No oropharyngeal exudate or posterior oropharyngeal erythema. Tonsils: No tonsillar exudate or tonsillar abscesses. Eyes:      General: No scleral icterus. Right eye: No discharge. Left eye: No discharge. Extraocular Movements: Extraocular movements intact. Conjunctiva/sclera: Conjunctivae normal.      Pupils: Pupils are equal, round, and reactive to light. Cardiovascular:      Rate and Rhythm: Normal rate and regular rhythm. Pulses: Normal pulses. Heart sounds: Normal heart sounds. No murmur heard. No friction rub. No gallop. Pulmonary:      Effort: Pulmonary effort is normal. No respiratory distress.       Breath sounds: Normal breath sounds. No stridor. No wheezing, rhonchi or rales. Abdominal:      General: Abdomen is flat. Bowel sounds are normal. There is no distension. Palpations: Abdomen is soft. Tenderness: There is no abdominal tenderness. There is no right CVA tenderness, left CVA tenderness or guarding. Musculoskeletal:      Cervical back: Normal range of motion and neck supple. No rigidity or tenderness. Thoracic back: Normal.      Lumbar back: Spasms and tenderness present. No bony tenderness. Positive right straight leg raise test and positive left straight leg raise test.      Comments: · L1-L2 Cremasteric Reflex (inner thigh sensation)  · L2 Adduct Thigh (cross legs)  · L3 Extend Knee  · L4 Dorsiflex Ankle (Up)  · L5 Point Great Toe Up  · L2 L3-L4 Knee Reflex  · S1 Flex Knee  · S2 Plantarflex Toes  · S3, 4, 5 Groin, Perianal Sensation  Assessed and symmetrical bilaterally   Skin:     General: Skin is warm and dry. Capillary Refill: Capillary refill takes less than 2 seconds. Coloration: Skin is not jaundiced or pale. Findings: No rash. Neurological:      General: No focal deficit present. Mental Status: She is alert and oriented to person, place, and time. Mental status is at baseline. Psychiatric:         Mood and Affect: Mood normal.         Behavior: Behavior normal.       DIAGNOSTIC RESULTS   LABS:    Labs Reviewed   URINALYSIS WITH REFLEX TO CULTURE - Abnormal; Notable for the following components:       Result Value    Clarity, UA CLOUDY (*)     Protein, UA TRACE (*)     Leukocyte Esterase, Urine MODERATE (*)     All other components within normal limits   MICROSCOPIC URINALYSIS - Abnormal; Notable for the following components:    Bacteria, UA 1+ (*)     WBC, UA 26 (*)     Epithelial Cells, UA 21 (*)     All other components within normal limits   CULTURE, URINE       When ordered, only abnormal lab results are displayed.  All other labs were within normal range time.    Regarding potential UTI--  Urinalysis with potentially contaminated catch. Results were discussed with the patient. The patient denies any dysuria at this time denies any CVA tenderness, fevers, chills. Her vitals are stable in the ED and she does not have any suprapubic pain. As such we will treat as contaminated catch and hold treatment at this time. Cultures pending. Regarding Dentalgia--  Denies dysphagia, dyspnea, neck stiffness, and odynophagia. Patient declined my offer of an inferior alveolar block    Patient seen and examined. In addition to the noted physical exam, I did not observe brawny edema, uvular deviation, menigismus, stridor, trismus, or drooling. Patient is tolerating saliva. Submandibular and sublingual areas are soft. Nontoxic appearance and no significant distress. Discussed treatment options with the patient and that the definitive care for this patient's symptoms is to see a dentist or an endodontist. No sign of Freddys Angina or deep space neck infection, and there is no airway compromise. Will start on antibiotics for dental infection and possible early abscess. There is nothing that I can see or palpate on exam that requires drainage at this time. Patient to obtain dental wax to cover the tooth and protect it from temperature changes and exposure to air when possible. Pain management and follow-up plan were discussed with the patient. It is understood that if the patient is not improving as expected or if other new symptoms or signs of concern develop, specifically pain, swelling of the face or neck or a fever, other etiologies or diagnoses may need to be considered requiring other tests, treatments, consultations, and/or admission. The diagnosis, plan, expected course, follow-up, and return precautions were discussed and all questions were answered. I spent roughly 5 minutes discussing smoking cessation with the patient    FINAL IMPRESSION      1.  Strain of lumbar region, initial encounter    2. Dentalgia    3. Dental caries    4. Smoker          DISPOSITION/PLAN   DISPOSITION Decision To Discharge 08/02/2022 12:21:15 PM      PATIENT REFERREDTO:  Germán Lopez MD  [address]  [phone]    Schedule an appointment as soon as possible for a visit in 3 days  Follow up within 3 days, Return to ED sooner if symptoms worsen      DISCHARGE MEDICATIONS:  Discharge Medication List as of 8/2/2022  2:04 PM        START taking these medications    Details   amoxicillin (AMOXIL) 500 MG capsule Take 1 capsule by mouth in the morning and 1 capsule at noon and 1 capsule before bedtime. Do all this for 7 days. , Disp-21 capsule, R-0Normal      chlorhexidine (PERIDEX) 0.12 % solution Take 15 mLs by mouth in the morning and 15 mLs at noon and 15 mLs before bedtime. Do all this for 14 days. , Disp-1 each, R-0Normal      lidocaine (LIDODERM) 5 % Place 1 patch onto the skin every 24 hours Place 1 patch onto the skin daily 12 hours on, 12 hours off., Disp-30 patch, R-0Normal      methocarbamol (ROBAXIN) 500 MG tablet Take 1 tablet by mouth 4 times daily as needed (Muscle spasms), Disp-20 tablet, R-0Normal      methylPREDNISolone (MEDROL DOSEPACK) 4 MG tablet Take by mouth., Disp-1 kit, R-0Normal             DISCONTINUED MEDICATIONS:  Discharge Medication List as of 8/2/2022  2:04 PM        STOP taking these medications       vitamin D (ERGOCALCIFEROL) 1.25 MG (01584 UT) CAPS capsule Comments:   Reason for Stopping:                      (Please note that portions ofthis note were completed with a voice recognition program.  Efforts were made to edit the dictations but occasionally words are mis-transcribed.)    DELORES Courtney CNP (electronically signed)             DELORES Courtney CNP  08/02/22 [id number]

## 2022-08-03 LAB — URINE CULTURE, ROUTINE: NORMAL

## 2022-08-05 DIAGNOSIS — R03.0 BLOOD PRESSURE ELEVATED WITHOUT HISTORY OF HTN: ICD-10-CM

## 2022-08-05 DIAGNOSIS — G44.329 CHRONIC POST-TRAUMATIC HEADACHE, NOT INTRACTABLE: ICD-10-CM

## 2022-08-05 RX ORDER — NIFEDIPINE 30 MG/1
TABLET, FILM COATED, EXTENDED RELEASE ORAL
Qty: 90 TABLET | Refills: 0 | Status: SHIPPED | OUTPATIENT
Start: 2022-08-05 | End: 2022-10-18 | Stop reason: SDUPTHER

## 2022-08-05 NOTE — TELEPHONE ENCOUNTER
Medication:   Requested Prescriptions     Pending Prescriptions Disp Refills    NIFEdipine (ADALAT CC) 30 MG extended release tablet [Pharmacy Med Name: NIFEDIPINE ER 30 MG TABLET] 90 tablet 1     Sig: TAKE 1 TABLET BY MOUTH EVERY DAY     Last Filled:  1/18/22    Last appt: 6/14/2022   Next appt: 9/12/2022

## 2022-08-21 RX ORDER — TRIAMCINOLONE ACETONIDE 5 MG/G
CREAM TOPICAL
Qty: 30 G | Refills: 2 | Status: SHIPPED | OUTPATIENT
Start: 2022-08-21

## 2022-09-12 ENCOUNTER — OFFICE VISIT (OUTPATIENT)
Dept: INTERNAL MEDICINE CLINIC | Age: 32
End: 2022-09-12
Payer: COMMERCIAL

## 2022-09-12 VITALS
DIASTOLIC BLOOD PRESSURE: 70 MMHG | OXYGEN SATURATION: 98 % | BODY MASS INDEX: 44.93 KG/M2 | HEART RATE: 89 BPM | SYSTOLIC BLOOD PRESSURE: 122 MMHG | RESPIRATION RATE: 14 BRPM | WEIGHT: 270 LBS

## 2022-09-12 DIAGNOSIS — B96.89 BACTERIAL VAGINOSIS: ICD-10-CM

## 2022-09-12 DIAGNOSIS — N76.0 BACTERIAL VAGINOSIS: ICD-10-CM

## 2022-09-12 DIAGNOSIS — B35.1 ONYCHOMYCOSIS: ICD-10-CM

## 2022-09-12 DIAGNOSIS — N89.8 VAGINAL ITCHING: ICD-10-CM

## 2022-09-12 DIAGNOSIS — R07.9 LEFT-SIDED CHEST PAIN: ICD-10-CM

## 2022-09-12 DIAGNOSIS — E11.9 CONTROLLED TYPE 2 DIABETES MELLITUS WITHOUT COMPLICATION, WITHOUT LONG-TERM CURRENT USE OF INSULIN (HCC): Primary | ICD-10-CM

## 2022-09-12 DIAGNOSIS — N91.2 AMENORRHEA: ICD-10-CM

## 2022-09-12 DIAGNOSIS — R35.0 URINARY FREQUENCY: ICD-10-CM

## 2022-09-12 LAB
BILIRUBIN URINE: NEGATIVE
BLOOD, URINE: NEGATIVE
CALCIUM OXALATE CRYSTALS: PRESENT
CLARITY: ABNORMAL
COLOR: YELLOW
CONTROL: NORMAL
CRYSTALS, UA: ABNORMAL /HPF
EPITHELIAL CELLS, UA: 15 /HPF (ref 0–5)
GLUCOSE URINE: NEGATIVE MG/DL
HBA1C MFR BLD: 7.1 %
HYALINE CASTS: 1 /LPF (ref 0–8)
KETONES, URINE: NEGATIVE MG/DL
LEUKOCYTE ESTERASE, URINE: ABNORMAL
MICROSCOPIC EXAMINATION: YES
NITRITE, URINE: NEGATIVE
PH UA: 5.5 (ref 5–8)
PREGNANCY TEST URINE, POC: NEGATIVE
PROTEIN UA: NEGATIVE MG/DL
RBC UA: ABNORMAL /HPF (ref 0–4)
SPECIFIC GRAVITY UA: 1.02 (ref 1–1.03)
URINE TYPE: ABNORMAL
UROBILINOGEN, URINE: 0.2 E.U./DL
WBC UA: 20 /HPF (ref 0–5)

## 2022-09-12 PROCEDURE — 3051F HG A1C>EQUAL 7.0%<8.0%: CPT | Performed by: INTERNAL MEDICINE

## 2022-09-12 PROCEDURE — 2022F DILAT RTA XM EVC RTNOPTHY: CPT | Performed by: INTERNAL MEDICINE

## 2022-09-12 PROCEDURE — 83036 HEMOGLOBIN GLYCOSYLATED A1C: CPT | Performed by: INTERNAL MEDICINE

## 2022-09-12 PROCEDURE — 99214 OFFICE O/P EST MOD 30 MIN: CPT | Performed by: INTERNAL MEDICINE

## 2022-09-12 PROCEDURE — 4004F PT TOBACCO SCREEN RCVD TLK: CPT | Performed by: INTERNAL MEDICINE

## 2022-09-12 PROCEDURE — G8427 DOCREV CUR MEDS BY ELIG CLIN: HCPCS | Performed by: INTERNAL MEDICINE

## 2022-09-12 PROCEDURE — 81025 URINE PREGNANCY TEST: CPT | Performed by: INTERNAL MEDICINE

## 2022-09-12 PROCEDURE — 93000 ELECTROCARDIOGRAM COMPLETE: CPT | Performed by: INTERNAL MEDICINE

## 2022-09-12 PROCEDURE — G8417 CALC BMI ABV UP PARAM F/U: HCPCS | Performed by: INTERNAL MEDICINE

## 2022-09-12 RX ORDER — METFORMIN HYDROCHLORIDE 500 MG/1
TABLET, EXTENDED RELEASE ORAL
Qty: 90 TABLET | Refills: 1 | Status: SHIPPED | OUTPATIENT
Start: 2022-09-12

## 2022-09-12 RX ORDER — OMEPRAZOLE 40 MG/1
40 CAPSULE, DELAYED RELEASE ORAL
Qty: 30 CAPSULE | Refills: 1 | Status: SHIPPED | OUTPATIENT
Start: 2022-09-12 | End: 2022-10-06

## 2022-09-12 ASSESSMENT — ENCOUNTER SYMPTOMS
WHEEZING: 0
DIARRHEA: 0
CONSTIPATION: 0
NAUSEA: 0
COUGH: 0
VOMITING: 0
ABDOMINAL PAIN: 0
SHORTNESS OF BREATH: 0

## 2022-09-12 NOTE — PROGRESS NOTES
Jamil Davis (:  1990) is a 36 y.o. female,Established patient, here for evaluation of the following chief complaint(s):  Diabetes      ASSESSMENT/PLAN:  1. Controlled type 2 diabetes mellitus without complication, without long-term current use of insulin ([facility])  Assessment & Plan:  A1C much improved. Tolerating the trulicity 3.9AY. On metformin also. Keep up the good work. Orders:  -     POCT glycosylated hemoglobin (Hb A1C)  -     metFORMIN (GLUCOPHAGE-XR) 500 MG extended release tablet; TAKE 1 TABLET BY MOUTH EVERY DAY AT NIGHT, Disp-90 tablet, R-1Normal  2. Left-sided chest pain  Unclear cause. Not tachycardic and not hypoxic. EKG wnl. Will get labs including d-dimer. Get CXR. Trial omeprazole given recent increase in reflux sxs. -     EKG 12 Lead  -     XR CHEST STANDARD (2 VW); Future  -     omeprazole (PRILOSEC) 40 MG delayed release capsule; Take 1 capsule by mouth every morning (before breakfast), Disp-30 capsule, R-1Normal  -     D-Dimer, Quantitative; Future, CBC, CMP, TSH  3. Amenorrhea  -     POCT urine pregnancy  4. Urinary frequency  Will check UA and culture. -     Urinalysis with Microscopic  -     Culture, Urine  5. Vaginal itching  Will look for causes of vaginal itching and discharge as below. -     VAGINAL PATHOGENS PROBE *A  -     C.trachomatis N.gonorrhoeae DNA, Urine  6. Onychomycosis  Possible treatments but would like to avoid. For now keep filed as short as possible and await outgrowth. Return in about 3 months (around 2022) for DM2, + A1C. SUBJECTIVE/OBJECTIVE:  HPI    2 main concerns. Getting a sharp pan in her left chest over the last 3-4 days. Has gotten occasionally in the past. Has been present continuously over the last few days. No radiation. No nausea. No SOB. No palpitations. Took gasx. Went away briefly. Can feel it when she drinks something.  No hx of blood clots    Since switched laundry detergent last month has had some itching - vaginal. Review of Systems   Constitutional:  Negative for chills, fatigue and fever. Respiratory:  Negative for cough, shortness of breath and wheezing. Cardiovascular:  Positive for chest pain. Negative for palpitations and leg swelling. Gastrointestinal:  Negative for abdominal pain, constipation, diarrhea, nausea and vomiting. Some acid reflux   Psychiatric/Behavioral:  Negative for dysphoric mood. Current Outpatient Medications on File Prior to Visit   Medication Sig Dispense Refill    triamcinolone (ARISTOCORT) 0.5 % cream APPLY TO AFFECTED AREA TWICE A DAY 30 g 2    NIFEdipine (ADALAT CC) 30 MG extended release tablet TAKE 1 TABLET BY MOUTH EVERY DAY 90 tablet 0    albuterol sulfate HFA (PROAIR HFA) 108 (90 Base) MCG/ACT inhaler Inhale 2 puffs into the lungs every 6 hours as needed for Wheezing or Shortness of Breath Dispense with SPACER and Instruct on use 18 g 1    gabapentin (NEURONTIN) 100 MG capsule Take 1 capsule by mouth 2 times daily for 60 days.  Intended supply: 30 days 60 capsule 1    Dulaglutide (TRULICITY) 1.5 SK/9.0BA SOPN Inject 1.5 mg into the skin once a week 4 pen 5    amitriptyline (ELAVIL) 25 mg tablet TAKE 1 TABLET BY MOUTH EVERY DAY AT NIGHT 90 tablet 1    Loratadine-Pseudoephedrine (CLARITIN-D 24 HOUR PO) Take by mouth      vitamin D (CHOLECALCIFEROL) 50 MCG (2000 UT) TABS tablet TAKE 1 TABLET BY MOUTH EVERY DAY 30 tablet 11    OneTouch Delica Lancets 90F MISC 1 each by Does not apply route daily 100 each 11    Lancet Devices (ONE TOUCH DELICA LANCING DEV) MISC 1 Device by Does not apply route daily 1 each 0    Blood Glucose Monitoring Suppl (ONE TOUCH ULTRA 2) w/Device KIT 1 kit by Does not apply route daily 1 kit 0    blood glucose test strips (ONETOUCH ULTRA) strip 1 each by In Vitro route daily 100 each 11    metFORMIN (GLUCOPHAGE-XR) 500 MG extended release tablet TAKE 1 TABLET BY MOUTH EVERY DAY AT NIGHT (Patient not taking: Reported on 9/12/2022) 90 tablet 1 fluticasone-salmeterol (ADVAIR DISKUS) 250-50 MCG/DOSE AEPB Inhale 1 puff into the lungs every 12 hours (Patient not taking: Reported on 9/12/2022) 60 each 1    [DISCONTINUED] vitamin D (ERGOCALCIFEROL) 1.25 MG (27117 UT) CAPS capsule TAKE 1 CAPSULE BY MOUTH ONE TIME PER WEEK 4 capsule 1     No current facility-administered medications on file prior to visit. Vitals:    09/12/22 1405   BP: 122/70   Pulse: 89   Resp: 14   SpO2: 98%     Physical Exam  Constitutional:       General: She is not in acute distress. Appearance: Normal appearance. She is not diaphoretic. HENT:      Head: Normocephalic and atraumatic. Right Ear: External ear normal.      Left Ear: External ear normal.      Nose: Nose normal.      Mouth/Throat:      Mouth: Mucous membranes are moist.      Pharynx: Oropharynx is clear. Eyes:      General: No scleral icterus. Conjunctiva/sclera: Conjunctivae normal.   Cardiovascular:      Rate and Rhythm: Normal rate and regular rhythm. Pulses: Normal pulses. Heart sounds: Normal heart sounds. No murmur heard. No friction rub. No gallop. Pulmonary:      Effort: Pulmonary effort is normal.      Breath sounds: Normal breath sounds. No wheezing, rhonchi or rales. Abdominal:      General: Abdomen is flat. Bowel sounds are normal.   Musculoskeletal:         General: No deformity. Cervical back: Normal range of motion. Right lower leg: No edema. Left lower leg: No edema. Skin:     General: Skin is warm and dry. Findings: No rash. Neurological:      General: No focal deficit present. Mental Status: She is alert.       Coordination: Coordination normal.      Gait: Gait normal.   Psychiatric:         Mood and Affect: Mood normal.         Behavior: Behavior normal.         On this date 9/12/2022 I have spent 35 minutes reviewing previous notes, test results and face to face with the patient discussing the diagnosis and importance of compliance with the treatment plan as well as documenting on the day of the visit. An electronic signature was used to authenticate this note.     --Gi Pabon MD

## 2022-09-13 LAB
C. TRACHOMATIS DNA ,URINE: NEGATIVE
CANDIDA SPECIES, DNA PROBE: ABNORMAL
GARDNERELLA VAGINALIS, DNA PROBE: ABNORMAL
N. GONORRHOEAE DNA, URINE: NEGATIVE
TRICHOMONAS VAGINALIS DNA: ABNORMAL

## 2022-09-13 RX ORDER — METRONIDAZOLE 500 MG/1
500 TABLET ORAL 2 TIMES DAILY
Qty: 14 TABLET | Refills: 0 | Status: SHIPPED | OUTPATIENT
Start: 2022-09-13 | End: 2022-09-20

## 2022-09-14 LAB — URINE CULTURE, ROUTINE: NORMAL

## 2022-10-05 DIAGNOSIS — R07.9 LEFT-SIDED CHEST PAIN: ICD-10-CM

## 2022-10-06 RX ORDER — OMEPRAZOLE 40 MG/1
CAPSULE, DELAYED RELEASE ORAL
Qty: 30 CAPSULE | Refills: 5 | Status: SHIPPED | OUTPATIENT
Start: 2022-10-06

## 2022-10-18 DIAGNOSIS — R03.0 BLOOD PRESSURE ELEVATED WITHOUT HISTORY OF HTN: ICD-10-CM

## 2022-10-18 DIAGNOSIS — G44.329 CHRONIC POST-TRAUMATIC HEADACHE, NOT INTRACTABLE: ICD-10-CM

## 2022-10-18 RX ORDER — NIFEDIPINE 30 MG/1
TABLET, FILM COATED, EXTENDED RELEASE ORAL
Qty: 90 TABLET | Refills: 0 | Status: SHIPPED | OUTPATIENT
Start: 2022-10-18

## 2022-11-10 RX ORDER — ALBUTEROL SULFATE 90 UG/1
AEROSOL, METERED RESPIRATORY (INHALATION)
Qty: 8.5 EACH | Refills: 7 | Status: SHIPPED | OUTPATIENT
Start: 2022-11-10

## 2022-11-16 ENCOUNTER — APPOINTMENT (OUTPATIENT)
Dept: GENERAL RADIOLOGY | Age: 32
End: 2022-11-16
Payer: COMMERCIAL

## 2022-11-16 ENCOUNTER — HOSPITAL ENCOUNTER (EMERGENCY)
Age: 32
Discharge: HOME | End: 2022-11-17
Payer: COMMERCIAL

## 2022-11-16 DIAGNOSIS — R51.9 NONINTRACTABLE HEADACHE, UNSPECIFIED CHRONICITY PATTERN, UNSPECIFIED HEADACHE TYPE: ICD-10-CM

## 2022-11-16 DIAGNOSIS — M79.10 MYALGIA: Primary | ICD-10-CM

## 2022-11-16 LAB
ANION GAP SERPL CALCULATED.3IONS-SCNC: 12 MMOL/L (ref 3–16)
BACTERIA: ABNORMAL /HPF
BASOPHILS ABSOLUTE: 0.1 K/UL (ref 0–0.2)
BASOPHILS RELATIVE PERCENT: 0.7 %
BILIRUBIN URINE: NEGATIVE
BLOOD, URINE: NEGATIVE
BUN BLDV-MCNC: 6 MG/DL (ref 7–20)
CALCIUM SERPL-MCNC: 9.8 MG/DL (ref 8.3–10.6)
CHLORIDE BLD-SCNC: 100 MMOL/L (ref 99–110)
CLARITY: ABNORMAL
CO2: 25 MMOL/L (ref 21–32)
COLOR: YELLOW
CREAT SERPL-MCNC: 0.7 MG/DL (ref 0.6–1.1)
EOSINOPHILS ABSOLUTE: 0.3 K/UL (ref 0–0.6)
EOSINOPHILS RELATIVE PERCENT: 3.4 %
EPITHELIAL CELLS, UA: 13 /HPF (ref 0–5)
GFR SERPL CREATININE-BSD FRML MDRD: >60 ML/MIN/{1.73_M2}
GLUCOSE BLD-MCNC: 127 MG/DL (ref 70–99)
GLUCOSE URINE: NEGATIVE MG/DL
GONADOTROPIN, CHORIONIC (HCG) QUANT: <5 MIU/ML
HCT VFR BLD CALC: 39.7 % (ref 36–48)
HEMOGLOBIN: 13 G/DL (ref 12–16)
HYALINE CASTS: 0 /LPF (ref 0–8)
KETONES, URINE: NEGATIVE MG/DL
LEUKOCYTE ESTERASE, URINE: NEGATIVE
LYMPHOCYTES ABSOLUTE: 3.1 K/UL (ref 1–5.1)
LYMPHOCYTES RELATIVE PERCENT: 32.6 %
MCH RBC QN AUTO: 27.4 PG (ref 26–34)
MCHC RBC AUTO-ENTMCNC: 32.7 G/DL (ref 31–36)
MCV RBC AUTO: 84 FL (ref 80–100)
MICROSCOPIC EXAMINATION: YES
MONOCYTES ABSOLUTE: 0.7 K/UL (ref 0–1.3)
MONOCYTES RELATIVE PERCENT: 7.2 %
NEUTROPHILS ABSOLUTE: 5.4 K/UL (ref 1.7–7.7)
NEUTROPHILS RELATIVE PERCENT: 56.1 %
NITRITE, URINE: NEGATIVE
PDW BLD-RTO: 14 % (ref 12.4–15.4)
PH UA: 5.5 (ref 5–8)
PLATELET # BLD: 304 K/UL (ref 135–450)
PMV BLD AUTO: 8 FL (ref 5–10.5)
POTASSIUM REFLEX MAGNESIUM: 3.8 MMOL/L (ref 3.5–5.1)
PROTEIN UA: NEGATIVE MG/DL
RAPID INFLUENZA  B AGN: NEGATIVE
RAPID INFLUENZA A AGN: NEGATIVE
RBC # BLD: 4.73 M/UL (ref 4–5.2)
RBC UA: 1 /HPF (ref 0–4)
S PYO AG THROAT QL: NEGATIVE
SARS-COV-2, NAAT: NOT DETECTED
SODIUM BLD-SCNC: 137 MMOL/L (ref 136–145)
SPECIFIC GRAVITY UA: 1.01 (ref 1–1.03)
URINE REFLEX TO CULTURE: ABNORMAL
URINE TYPE: ABNORMAL
UROBILINOGEN, URINE: 0.2 E.U./DL
WBC # BLD: 9.5 K/UL (ref 4–11)
WBC UA: 4 /HPF (ref 0–5)

## 2022-11-16 PROCEDURE — 99284 EMERGENCY DEPT VISIT MOD MDM: CPT

## 2022-11-16 PROCEDURE — 87081 CULTURE SCREEN ONLY: CPT

## 2022-11-16 PROCEDURE — 85025 COMPLETE CBC W/AUTO DIFF WBC: CPT

## 2022-11-16 PROCEDURE — 84702 CHORIONIC GONADOTROPIN TEST: CPT

## 2022-11-16 PROCEDURE — 71046 X-RAY EXAM CHEST 2 VIEWS: CPT

## 2022-11-16 PROCEDURE — 81001 URINALYSIS AUTO W/SCOPE: CPT

## 2022-11-16 PROCEDURE — 80048 BASIC METABOLIC PNL TOTAL CA: CPT

## 2022-11-16 PROCEDURE — 87635 SARS-COV-2 COVID-19 AMP PRB: CPT

## 2022-11-16 PROCEDURE — 87804 INFLUENZA ASSAY W/OPTIC: CPT

## 2022-11-16 PROCEDURE — 6370000000 HC RX 637 (ALT 250 FOR IP): Performed by: PHYSICIAN ASSISTANT

## 2022-11-16 PROCEDURE — 87880 STREP A ASSAY W/OPTIC: CPT

## 2022-11-16 RX ORDER — PROMETHAZINE HYDROCHLORIDE 25 MG/1
25 TABLET ORAL ONCE
Status: COMPLETED | OUTPATIENT
Start: 2022-11-16 | End: 2022-11-16

## 2022-11-16 RX ORDER — ACETAMINOPHEN 325 MG/1
650 TABLET ORAL ONCE
Status: COMPLETED | OUTPATIENT
Start: 2022-11-16 | End: 2022-11-16

## 2022-11-16 RX ORDER — ONDANSETRON 4 MG/1
4 TABLET, ORALLY DISINTEGRATING ORAL EVERY 8 HOURS PRN
Qty: 10 TABLET | Refills: 0 | Status: SHIPPED | OUTPATIENT
Start: 2022-11-16

## 2022-11-16 RX ORDER — IBUPROFEN 600 MG/1
600 TABLET ORAL EVERY 6 HOURS PRN
Qty: 20 TABLET | Refills: 0 | Status: SHIPPED | OUTPATIENT
Start: 2022-11-16

## 2022-11-16 RX ADMIN — PROMETHAZINE HYDROCHLORIDE 25 MG: 25 TABLET ORAL at 21:23

## 2022-11-16 RX ADMIN — ACETAMINOPHEN 650 MG: 325 TABLET ORAL at 21:23

## 2022-11-16 RX ADMIN — IBUPROFEN 600 MG: 200 TABLET, FILM COATED ORAL at 21:23

## 2022-11-16 ASSESSMENT — PAIN SCALES - GENERAL: PAINLEVEL_OUTOF10: 8

## 2022-11-16 ASSESSMENT — PAIN DESCRIPTION - FREQUENCY: FREQUENCY: CONTINUOUS

## 2022-11-16 ASSESSMENT — PAIN DESCRIPTION - DESCRIPTORS: DESCRIPTORS: ACHING

## 2022-11-16 ASSESSMENT — PAIN DESCRIPTION - LOCATION: LOCATION: HEAD

## 2022-11-16 ASSESSMENT — LIFESTYLE VARIABLES: HOW OFTEN DO YOU HAVE A DRINK CONTAINING ALCOHOL: NEVER

## 2022-11-16 ASSESSMENT — PAIN - FUNCTIONAL ASSESSMENT: PAIN_FUNCTIONAL_ASSESSMENT: 0-10

## 2022-11-16 NOTE — LETTER
[facility] Emergency Department  [address] [facility] [address]  Phone: [phone]               November 16, 2022    Patient: Darius Norris   YOB: 1990   Date of Visit: 11/16/2022       To Whom It May Concern:    Sahara Maza was seen and treated in our emergency department on 11/16/2022. She may return to work on 11/18/22.       Sincerely,       Darra Fothergill, PA         Signature:__________________________________

## 2022-11-17 VITALS
HEART RATE: 84 BPM | DIASTOLIC BLOOD PRESSURE: 88 MMHG | RESPIRATION RATE: 17 BRPM | SYSTOLIC BLOOD PRESSURE: 140 MMHG | TEMPERATURE: 97.2 F | WEIGHT: 273.15 LBS | HEIGHT: 65 IN | OXYGEN SATURATION: 99 % | BODY MASS INDEX: 45.51 KG/M2

## 2022-11-17 ASSESSMENT — PAIN - FUNCTIONAL ASSESSMENT: PAIN_FUNCTIONAL_ASSESSMENT: NONE - DENIES PAIN

## 2022-11-17 ASSESSMENT — ENCOUNTER SYMPTOMS
SORE THROAT: 1
COUGH: 1
TROUBLE SWALLOWING: 0
VOMITING: 0
COLOR CHANGE: 0
ABDOMINAL PAIN: 0
VOICE CHANGE: 0

## 2022-11-17 NOTE — ED PROVIDER NOTES
[address] [facility]      Pt Name: Sweta Pastrana  MRN: [id number]  [place] 1990  Date of evaluation: 11/16/2022  Provider: KATTY Da Silva    This patient was not seen and evaluated by the attending physician No att. providers found. CHIEF COMPLAINT       Chief Complaint   Patient presents with    Headache     Weakness, bodyaches       CRITICAL CARE TIME   I performed a total Critical Care time of 15 minutes, excluding separately reportable procedures. There was a high probability of clinically significant/life threatening deterioration in the patient's condition which required my urgent intervention. Not limited to multiple reexaminations, discussions with attending physician and consultants. HISTORY OF PRESENT ILLNESS  (Location/Symptom, Timing/Onset, Context/Setting, Quality, Duration, Modifying Factors, Severity.)   Sweta Pastrana is a 29 y.o. female who presents to the emergency department complaining of headaches fatigue body aches. She has not taken anything at home for the symptoms. The symptoms started on Sunday with a frontal headache body aches started yesterday. She had a sore throat and it felt scratchy but this went away on Monday. She has had stuffy nose and congestion. No nausea vomiting or diarrhea. She is a diabetic with history of asthma tells me her blood sugars have been \"good. \"  Less than 200. She has a history of migraines. This was not sudden onset or thunderclap presentation. She does smoke cigarettes. Nursing Notes were reviewed and I agree. REVIEW OF SYSTEMS    (2-9 systems for level 4, 10 or more for level 5)     Review of Systems   Constitutional:  Positive for fatigue. Negative for fever. HENT:  Positive for congestion and sore throat. Negative for trouble swallowing and voice change. Respiratory:  Positive for cough. Cardiovascular:  Negative for chest pain. Gastrointestinal:  Negative for abdominal pain and vomiting. Musculoskeletal:  Positive for myalgias. Negative for neck pain and neck stiffness. Skin:  Negative for color change, rash and wound. Neurological:  Positive for headaches. Negative for weakness and numbness. Psychiatric/Behavioral:  Negative for agitation, behavioral problems and confusion. Except as noted above the remainder of the review of systems was reviewed and negative.        PAST MEDICAL HISTORY         Diagnosis Date    Asthma     Chronic post-traumatic headache, not intractable 3/3/2021    COVID-19     12-4-2021    Diabetes mellitus ([facility] [address]     Dysmenorrhea     Essential hypertension 8/26/2021    Hyperlipidemia     Lactose intolerance     Menstrual irregularity     Obesity     Tobacco dependence        SURGICAL HISTORY           Procedure Laterality Date    FRACTURE SURGERY      RIGHT ANKLE    HIP SURGERY Bilateral     for dislocation - ? 2006/2007    LAPAROSCOPY N/A 1/12/2022    DIAGNOSTIC LAPAROSCOPY, LYSIS OF ADHESIONS performed by Martha García MD at [address] [facility] [address]       Discharge Medication List as of 11/17/2022 12:05 AM        CONTINUE these medications which have NOT CHANGED    Details   albuterol sulfate HFA (PROVENTIL;VENTOLIN;PROAIR) 108 (90 Base) MCG/ACT inhaler TAKE 2 PUFFS BY MOUTH EVERY 6 HOURS AS NEEDED FOR WHEEZE, Disp-8.5 each, R-7Normal      NIFEdipine (ADALAT CC) 30 MG extended release tablet TAKE 1 TABLET BY MOUTH EVERY DAY, Disp-90 tablet, R-0Normal      omeprazole (PRILOSEC) 40 MG delayed release capsule TAKE 1 CAPSULE BY MOUTH EVERY DAY IN THE MORNING BEFORE BREAKFAST, Disp-30 capsule, R-5Normal      metFORMIN (GLUCOPHAGE-XR) 500 MG extended release tablet TAKE 1 TABLET BY MOUTH EVERY DAY AT NIGHT, Disp-90 tablet, R-1Normal      triamcinolone (ARISTOCORT) 0.5 % cream APPLY TO AFFECTED AREA TWICE A DAY, Disp-30 g, R-2, Normal      gabapentin (NEURONTIN) 100 MG capsule Take 1 capsule by mouth 2 times daily for 60 days. Intended supply: 30 days, Disp-60 capsule, R-1Normal      Dulaglutide (TRULICITY) 1.5 ND/9.7CS SOPN Inject 1.5 mg into the skin once a week, Disp-4 pen, R-5Normal      amitriptyline (ELAVIL) 25 mg tablet TAKE 1 TABLET BY MOUTH EVERY DAY AT NIGHT, Disp-90 tablet, R-1Normal      Loratadine-Pseudoephedrine (CLARITIN-D 24 HOUR PO) Take by mouthHistorical Med      vitamin D (CHOLECALCIFEROL) 50 MCG (2000) TABS tablet TAKE 1 TABLET BY MOUTH EVERY DAY, Disp-30 tablet, R-11Normal      fluticasone-salmeterol (ADVAIR DISKUS) 250-50 MCG/DOSE AEPB Inhale 1 puff into the lungs every 12 hours, Disp-60 each, R-1Normal      OneTouch Delica Lancets 79C MISC 1 each by Does not apply route daily, Disp-100 each, R-11Normal      Lancet Devices (ONE TOUCH DELICA LANCING DEV) MISC DAILY Starting Tue 3/2/2021, Disp-1 each, R-0, Normal      Blood Glucose Monitoring Suppl (ONE TOUCH ULTRA 2) w/Device KIT DAILY Starting Tue 3/2/2021, Disp-1 kit, R-0, Normal      blood glucose test strips (ONETOUCH ULTRA) strip 1 each by In Vitro route daily, Disp-100 each, R-11Normal             ALLERGIES     Codeine and Lactose    FAMILY HISTORY           Problem Relation Age of Onset    Cancer Father     Hypertension Father     Hypertension Maternal Aunt      Family Status   Relation Name Status    Mother  Alive    Father      dashawn Thao  (Not Specified)        SOCIAL HISTORY      reports that she has been smoking cigarettes. She has a 0.50 pack-year smoking history. She has never used smokeless tobacco. She reports current alcohol use of about 0.8 standard drinks per week. She reports that she does not use drugs. PHYSICAL EXAM    (up to 7 for level 4, 8 or more for level 5)     ED Triage Vitals [22]   BP Temp Temp Source Heart Rate Resp SpO2 Height Weight   (!) 149/90 97.2 °F (36.2 °C) Temporal 83 20 98 % 5' 5\" (1.651 m) 273 lb 2.4 oz (123.9 kg)       Physical Exam  Vitals and nursing note reviewed. Constitutional:       Appearance: Normal appearance. HENT:      Head: Normocephalic and atraumatic. Right Ear: Tympanic membrane normal.      Left Ear: Tympanic membrane normal.      Mouth/Throat:      Mouth: Mucous membranes are moist.   Eyes:      Pupils: Pupils are equal, round, and reactive to light. Cardiovascular:      Rate and Rhythm: Normal rate. Pulmonary:      Effort: Pulmonary effort is normal. No respiratory distress. Abdominal:      Tenderness: There is no abdominal tenderness. There is no guarding or rebound. Musculoskeletal:         General: Normal range of motion. Cervical back: Normal range of motion. Skin:     General: Skin is warm. Neurological:      General: No focal deficit present. Mental Status: She is alert and oriented to person, place, and time. Psychiatric:         Mood and Affect: Mood normal.         Behavior: Behavior normal.       DIAGNOSTIC RESULTS     RADIOLOGY:   Non-plain film images such as CT, Ultrasound and MRI are read by the radiologist. Plain radiographic images are visualized and preliminarily interpreted by KATTY Conrad with the below findings:    Reviewed radiologist's interpretation. Interpretation per the Radiologist below, if available at the time of this note:    XR CHEST (2 VW)   Final Result   No acute process.                LABS:  Labs Reviewed   BASIC METABOLIC PANEL W/ REFLEX TO MG FOR LOW K - Abnormal; Notable for the following components:       Result Value    Glucose 127 (*)     BUN 6 (*)     All other components within normal limits   URINALYSIS WITH REFLEX TO CULTURE - Abnormal; Notable for the following components:    Clarity, UA CLOUDY (*)     All other components within normal limits   MICROSCOPIC URINALYSIS - Abnormal; Notable for the following components:    Bacteria, UA 1+ (*)     Epithelial Cells, UA 13 (*)     All other components within normal limits   STREP SCREEN GROUP A THROAT   COVID-19, RAPID   RAPID INFLUENZA A/B ANTIGENS   CULTURE, BETA STREP CONFIRM PLATES   HCG, QUANTITATIVE, PREGNANCY   CBC WITH AUTO DIFFERENTIAL       All other labs were within normal range or not returned as of this dictation. EMERGENCY DEPARTMENT COURSE and DIFFERENTIAL DIAGNOSIS/MDM:   Vitals:    Vitals:    11/16/22 2053 11/17/22 0012   BP: (!) 149/90 (!) 140/88   Pulse: 83 84   Resp: 20 17   Temp: 97.2 °F (36.2 °C)    TempSrc: Temporal    SpO2: 98% 99%   Weight: 273 lb 2.4 oz (123.9 kg)    Height: 5' 5\" (1.651 m)      I discussed with Cristina Galicia and/or family the exam results, diagnosis, care, prognosis, reasons to return and the importance of follow up. Patient and/or family is in full agreement with plan and all questions have been answered. Specific discharge instructions explained, including reasons to return to the emergency department. Cristina Galicia is well appearing, non-toxic, and afebrile at the time of discharge. Patient has no abdominal tenderness or pain on exam.  Lab work is reassuring negative for COVID and the flu. Headache was not thunderclap in presentation or sudden onset. History of migraines. No focal neurologic deficit. Afebrile, not tachycardic not hypoxic. Resting comfortably on serial examination. No vomiting. Will discharge with instructions to take Tylenol ibuprofen nausea medication as needed return for new or worsening. Otherwise follow-up with primary care. Encourage smoking cessation    I estimate there is LOW risk for SUBARACHNOID HEMORRHAGE, MENINGITIS, INTRACRANIAL HEMORRHAGE, ENCEPHALITIS, TEMPORAL ARTERITIS, PSEUDOTUMOR CEREBIR, ACUTE GLAUCOMA, SUBDURAL OR EPIDURAL HEMATOMA, OR STROKE, thus I consider the discharge disposition reasonable. CONSULTS:  None    PROCEDURES:  Procedures      FINAL IMPRESSION      1. Myalgia    2.  Nonintractable headache, unspecified chronicity pattern, unspecified headache type          DISPOSITION/PLAN   DISPOSITION Decision To Discharge 11/16/2022 11:53:18 PM      PATIENT REFERRED TO:  Africa Eason MD  [address] [facility], [address]  [phone]    Call in 1 day  For follow up      Kai An:  Discharge Medication List as of 11/17/2022 12:05 AM        START taking these medications    Details   ibuprofen (IBU) 600 MG tablet Take 1 tablet by mouth every 6 hours as needed for Pain, Disp-20 tablet, R-0Print      ondansetron (ZOFRAN ODT) 4 MG disintegrating tablet Take 1 tablet by mouth every 8 hours as needed for Nausea or Vomiting Let dissolve in mouth., Disp-10 tablet, R-0Print             (Please note that portions of this note were completed with a voice recognition program.  Efforts were made to edit the dictations but occasionally words are mis-transcribed.)    KATTY Sim [place]  11/17/22 0364

## 2022-11-18 LAB — S PYO THROAT QL CULT: NORMAL

## 2022-12-06 ENCOUNTER — OFFICE VISIT (OUTPATIENT)
Dept: GYNECOLOGY | Age: 32
End: 2022-12-06
Payer: COMMERCIAL

## 2022-12-06 VITALS
HEIGHT: 65 IN | SYSTOLIC BLOOD PRESSURE: 120 MMHG | RESPIRATION RATE: 17 BRPM | DIASTOLIC BLOOD PRESSURE: 74 MMHG | HEART RATE: 87 BPM | OXYGEN SATURATION: 99 % | BODY MASS INDEX: 42.42 KG/M2 | WEIGHT: 254.6 LBS

## 2022-12-06 DIAGNOSIS — N92.6 IRREGULAR MENSTRUAL BLEEDING: ICD-10-CM

## 2022-12-06 DIAGNOSIS — Z01.419 WELL WOMAN EXAM WITH ROUTINE GYNECOLOGICAL EXAM: Primary | ICD-10-CM

## 2022-12-06 PROCEDURE — 99395 PREV VISIT EST AGE 18-39: CPT | Performed by: OBSTETRICS & GYNECOLOGY

## 2022-12-06 PROCEDURE — G8484 FLU IMMUNIZE NO ADMIN: HCPCS | Performed by: OBSTETRICS & GYNECOLOGY

## 2022-12-06 PROCEDURE — 3078F DIAST BP <80 MM HG: CPT | Performed by: OBSTETRICS & GYNECOLOGY

## 2022-12-06 PROCEDURE — 3074F SYST BP LT 130 MM HG: CPT | Performed by: OBSTETRICS & GYNECOLOGY

## 2022-12-06 RX ORDER — VITAMIN A ACETATE, BETA CAROTENE, ASCORBIC ACID, CHOLECALCIFEROL, .ALPHA.-TOCOPHEROL ACETATE, DL-, THIAMINE MONONITRATE, RIBOFLAVIN, NIACINAMIDE, PYRIDOXINE HYDROCHLORIDE, FOLIC ACID, CYANOCOBALAMIN, CALCIUM CARBONATE, FERROUS FUMARATE, ZINC OXIDE, CUPRIC OXIDE 3080; 12; 120; 400; 1; 1.84; 3; 20; 22; 920; 25; 200; 27; 10; 2 [IU]/1; UG/1; MG/1; [IU]/1; MG/1; MG/1; MG/1; MG/1; MG/1; [IU]/1; MG/1; MG/1; MG/1; MG/1; MG/1
1 TABLET, FILM COATED ORAL DAILY
Qty: 90 TABLET | Refills: 3 | Status: SHIPPED | OUTPATIENT
Start: 2022-12-06 | End: 2022-12-06 | Stop reason: SDUPTHER

## 2022-12-06 RX ORDER — VITAMIN A ACETATE, BETA CAROTENE, ASCORBIC ACID, CHOLECALCIFEROL, .ALPHA.-TOCOPHEROL ACETATE, DL-, THIAMINE MONONITRATE, RIBOFLAVIN, NIACINAMIDE, PYRIDOXINE HYDROCHLORIDE, FOLIC ACID, CYANOCOBALAMIN, CALCIUM CARBONATE, FERROUS FUMARATE, ZINC OXIDE, CUPRIC OXIDE 3080; 12; 120; 400; 1; 1.84; 3; 20; 22; 920; 25; 200; 27; 10; 2 [IU]/1; UG/1; MG/1; [IU]/1; MG/1; MG/1; MG/1; MG/1; MG/1; [IU]/1; MG/1; MG/1; MG/1; MG/1; MG/1
1 TABLET, FILM COATED ORAL DAILY
Qty: 90 TABLET | Refills: 3 | Status: SHIPPED | OUTPATIENT
Start: 2022-12-06

## 2022-12-07 LAB
GONADOTROPIN, CHORIONIC (HCG) QUANT: <5 MIU/ML
PROLACTIN: 5.8 NG/ML

## 2022-12-07 ASSESSMENT — ENCOUNTER SYMPTOMS
EYES NEGATIVE: 1
ALLERGIC/IMMUNOLOGIC NEGATIVE: 1
GASTROINTESTINAL NEGATIVE: 1
RESPIRATORY NEGATIVE: 1

## 2022-12-08 NOTE — PROGRESS NOTES
Subjective:      Patient ID: Juliette Charles is a 29 y.o. female. Patient with some pelvic cramping in the middle. Patient wants to try to get pregnant and states she is trying. Gynecologic Exam    Menstrual Problem      Review of Systems   Constitutional: Negative. HENT: Negative. Eyes: Negative. Respiratory: Negative. Cardiovascular: Negative. Gastrointestinal: Negative. Endocrine: Negative. Genitourinary:  Positive for menstrual problem and pelvic pain. Musculoskeletal: Negative. Skin: Negative. Allergic/Immunologic: Negative. Neurological: Negative. Hematological: Negative. Psychiatric/Behavioral: Negative.        Date of Birth 1990  Past Medical History:   Diagnosis Date    Asthma     Chronic post-traumatic headache, not intractable 3/3/2021    COVID-19     2021    Diabetes mellitus ([facility] [address]     Dysmenorrhea     Essential hypertension 2021    Hyperlipidemia     Lactose intolerance     Menstrual irregularity     Obesity     Tobacco dependence      Past Surgical History:   Procedure Laterality Date    FRACTURE SURGERY      RIGHT ANKLE    HIP SURGERY Bilateral     for dislocation - ?     LAPAROSCOPY N/A 2022    DIAGNOSTIC LAPAROSCOPY, LYSIS OF ADHESIONS performed by Christian Smith MD at [facility]     OB History    Para Term  AB Living   0 0 0 0 0 0   SAB IAB Ectopic Molar Multiple Live Births   0 0 0 0 0 0     Social History     Socioeconomic History    Marital status:      Spouse name: Not on file    Number of children: Not on file    Years of education: Not on file    Highest education level: Not on file   Occupational History    Not on file   Tobacco Use    Smoking status: Every Day     Packs/day: 0.10     Years: 5.00     Pack years: 0.50     Types: Cigarettes     Last attempt to quit: 3/26/2018     Years since quittin.7    Smokeless tobacco: Never   Vaping Use    Vaping Use: Never used   Substance and Sexual Activity    Alcohol use: Yes     Alcohol/week: 0.8 standard drinks     Types: 1 Standard drinks or equivalent per week     Comment: once a week    Drug use: No    Sexual activity: Yes     Partners: Male   Other Topics Concern    Not on file   Social History Narrative    Not on file     Social Determinants of Health     Financial Resource Strain: Not on file   Food Insecurity: Not on file   Transportation Needs: Not on file   Physical Activity: Not on file   Stress: Not on file   Social Connections: Not on file   Intimate Partner Violence: Not on file   Housing Stability: Not on file     Allergies   Allergen Reactions    Codeine Anaphylaxis, Swelling and Other (See Comments)     Throat swelling. Lactose Nausea And Vomiting     Outpatient Medications Marked as Taking for the 12/6/22 encounter (Office Visit) with Ijeoma Crespo MD   Medication Sig Dispense Refill    Prenatal Vit-Fe Fumarate-FA (PRENATAL PLUS) 27-1 MG TABS Take 1 tablet by mouth daily 90 tablet 3    ibuprofen (IBU) 600 MG tablet Take 1 tablet by mouth every 6 hours as needed for Pain 20 tablet 0    ondansetron (ZOFRAN ODT) 4 MG disintegrating tablet Take 1 tablet by mouth every 8 hours as needed for Nausea or Vomiting Let dissolve in mouth.  10 tablet 0    albuterol sulfate HFA (PROVENTIL;VENTOLIN;PROAIR) 108 (90 Base) MCG/ACT inhaler TAKE 2 PUFFS BY MOUTH EVERY 6 HOURS AS NEEDED FOR WHEEZE 8.5 each 7    NIFEdipine (ADALAT CC) 30 MG extended release tablet TAKE 1 TABLET BY MOUTH EVERY DAY 90 tablet 0    omeprazole (PRILOSEC) 40 MG delayed release capsule TAKE 1 CAPSULE BY MOUTH EVERY DAY IN THE MORNING BEFORE BREAKFAST 30 capsule 5    metFORMIN (GLUCOPHAGE-XR) 500 MG extended release tablet TAKE 1 TABLET BY MOUTH EVERY DAY AT NIGHT 90 tablet 1    triamcinolone (ARISTOCORT) 0.5 % cream APPLY TO AFFECTED AREA TWICE A DAY 30 g 2    Dulaglutide (TRULICITY) 1.5 RD/2.9SG SOPN Inject 1.5 mg into the skin once a week 4 pen 5    Loratadine-Pseudoephedrine (CLARITIN-D 24 HOUR PO) Take by mouth      vitamin D (CHOLECALCIFEROL) 50 MCG (2000 UT) TABS tablet TAKE 1 TABLET BY MOUTH EVERY DAY 30 tablet 11    OneTouch Delica Lancets 41R MISC 1 each by Does not apply route daily 100 each 11    Lancet Devices (ONE TOUCH DELICA LANCING DEV) MISC 1 Device by Does not apply route daily 1 each 0    Blood Glucose Monitoring Suppl (ONE TOUCH ULTRA 2) w/Device KIT 1 kit by Does not apply route daily 1 kit 0    blood glucose test strips (ONETOUCH ULTRA) strip 1 each by In Vitro route daily 100 each 11     Family History   Problem Relation Age of Onset    Cancer Father     Hypertension Father     Hypertension Maternal Aunt      /74 (Site: Right Upper Arm, Position: Sitting, Cuff Size: Large Adult)   Pulse 87   Resp 17   Ht 5' 5\" (1.651 m)   Wt 254 lb 9.6 oz (115.5 kg)   LMP 11/27/2022   SpO2 99%   BMI 42.37 kg/m²       Objective:   Physical Exam  Constitutional:       Appearance: Normal appearance. She is well-developed and normal weight. HENT:      Head: Normocephalic and atraumatic. Nose: Nose normal.      Mouth/Throat:      Mouth: Mucous membranes are moist.      Pharynx: Oropharynx is clear. Eyes:      Extraocular Movements: Extraocular movements intact. Neck:      Thyroid: No thyromegaly. Cardiovascular:      Rate and Rhythm: Normal rate and regular rhythm. Pulses: Normal pulses. Heart sounds: Normal heart sounds. No murmur heard. No friction rub. No gallop. Pulmonary:      Effort: Pulmonary effort is normal. No respiratory distress. Breath sounds: Normal breath sounds. No stridor. No wheezing, rhonchi or rales. Chest:      Chest wall: No tenderness. Breasts:     Right: Normal. No swelling, bleeding, inverted nipple, mass, nipple discharge, skin change or tenderness. Left: Normal. No swelling, bleeding, inverted nipple, mass, nipple discharge, skin change or tenderness.    Abdominal:      General: Bowel sounds are normal. There is no distension. Palpations: Abdomen is soft. There is no mass. Tenderness: There is no abdominal tenderness. There is no guarding or rebound. Hernia: No hernia is present. There is no hernia in the left inguinal area or right inguinal area. Genitourinary:     General: Normal vulva. Exam position: Lithotomy position. Pubic Area: No rash. Labia:         Right: No rash, tenderness, lesion or injury. Left: No rash, tenderness, lesion or injury. Urethra: No prolapse, urethral pain, urethral swelling or urethral lesion. Vagina: No signs of injury and foreign body. No vaginal discharge, erythema, tenderness, bleeding, lesions or prolapsed vaginal walls. Cervix: No cervical motion tenderness, discharge, friability, lesion, erythema, cervical bleeding or eversion. Uterus: Not deviated, not enlarged, not fixed, not tender and no uterine prolapse. Adnexa:         Right: No mass, tenderness or fullness. Left: No mass, tenderness or fullness. Rectum: No anal fissure or external hemorrhoid. Comments: Normal urethral meatus, normal urethra, nl bladder  Musculoskeletal:         General: No swelling, tenderness, deformity or signs of injury. Normal range of motion. Cervical back: Normal range of motion and neck supple. No rigidity. Lymphadenopathy:      Cervical: No cervical adenopathy. Lower Body: No right inguinal adenopathy. No left inguinal adenopathy. Skin:     General: Skin is warm and dry. Coloration: Skin is not jaundiced or pale. Findings: No bruising, erythema, lesion or rash. Neurological:      General: No focal deficit present. Mental Status: She is alert and oriented to person, place, and time. Mental status is at baseline. Deep Tendon Reflexes: Reflexes are normal and symmetric.    Psychiatric:         Mood and Affect: Mood normal.         Behavior: Behavior normal.         Thought Content: Thought content normal.         Judgment: Judgment normal.       Assessment:      Annual  Preconception planning  Late with cycle      Plan:      Pap, calcium, exercise  PNV-needs to get on insulin. Has appointment with Dr. Rhys Swartz. PNV. Can consider Clomid once stable on insulin.  Needs to see MFM once pregnant as well  Quant bhcg/prolactin        Elissa Varner MD

## 2022-12-13 ENCOUNTER — TELEMEDICINE (OUTPATIENT)
Dept: INTERNAL MEDICINE CLINIC | Age: 32
End: 2022-12-13
Payer: COMMERCIAL

## 2022-12-13 DIAGNOSIS — E11.9 CONTROLLED TYPE 2 DIABETES MELLITUS WITHOUT COMPLICATION, WITHOUT LONG-TERM CURRENT USE OF INSULIN (HCC): ICD-10-CM

## 2022-12-13 DIAGNOSIS — T78.1XXA POLLEN-FOOD ALLERGY, INITIAL ENCOUNTER: Primary | ICD-10-CM

## 2022-12-13 PROCEDURE — 99214 OFFICE O/P EST MOD 30 MIN: CPT | Performed by: INTERNAL MEDICINE

## 2022-12-13 PROCEDURE — G8427 DOCREV CUR MEDS BY ELIG CLIN: HCPCS | Performed by: INTERNAL MEDICINE

## 2022-12-13 PROCEDURE — 3051F HG A1C>EQUAL 7.0%<8.0%: CPT | Performed by: INTERNAL MEDICINE

## 2022-12-13 PROCEDURE — 2022F DILAT RTA XM EVC RTNOPTHY: CPT | Performed by: INTERNAL MEDICINE

## 2022-12-13 RX ORDER — INSULIN GLARGINE 100 [IU]/ML
20 INJECTION, SOLUTION SUBCUTANEOUS NIGHTLY
Qty: 5 ADJUSTABLE DOSE PRE-FILLED PEN SYRINGE | Refills: 0 | Status: SHIPPED | OUTPATIENT
Start: 2022-12-13

## 2022-12-13 RX ORDER — PEN NEEDLE, DIABETIC 29 GAUGE
1 NEEDLE, DISPOSABLE MISCELLANEOUS DAILY
Qty: 100 EACH | Refills: 3 | Status: SHIPPED | OUTPATIENT
Start: 2022-12-13

## 2022-12-13 RX ORDER — INSULIN LISPRO 100 [IU]/ML
10 INJECTION, SOLUTION INTRAVENOUS; SUBCUTANEOUS
Qty: 15 ML | Refills: 1 | Status: SHIPPED | OUTPATIENT
Start: 2022-12-13

## 2022-12-13 SDOH — ECONOMIC STABILITY: FOOD INSECURITY: WITHIN THE PAST 12 MONTHS, YOU WORRIED THAT YOUR FOOD WOULD RUN OUT BEFORE YOU GOT MONEY TO BUY MORE.: NEVER TRUE

## 2022-12-13 SDOH — ECONOMIC STABILITY: FOOD INSECURITY: WITHIN THE PAST 12 MONTHS, THE FOOD YOU BOUGHT JUST DIDN'T LAST AND YOU DIDN'T HAVE MONEY TO GET MORE.: NEVER TRUE

## 2022-12-13 ASSESSMENT — SOCIAL DETERMINANTS OF HEALTH (SDOH): HOW HARD IS IT FOR YOU TO PAY FOR THE VERY BASICS LIKE FOOD, HOUSING, MEDICAL CARE, AND HEATING?: NOT HARD AT ALL

## 2022-12-13 NOTE — PROGRESS NOTES
Talha Holloway (:  1990) is a 29 y.o. female,Established patient, here for evaluation of the following chief complaint(s):   Diabetes      ASSESSMENT/PLAN:  1. Pollen-food allergy, initial encounter  Comments:  Check IgEs. Orders:  -     ALLERGEN ORANGE IGE; Future  -     ALLERGEN STRAWBERRY; Future  -     Pineapple IgE; Future  -     ALLERGEN PEANUT; Future  -     ALLERGEN BANANA; Future  2. Controlled type 2 diabetes mellitus without complication, without long-term current use of insulin ([facility])  Assessment & Plan:  Control has been much better lately on metformin and trulicity but actively trying to get pregnant. Will change her to insulin. Will use synthetic initially but may need change to NPH/r. Lantus 20 units daily and lispro 10 units with meals. Check BG frequently. Discussed that need to be aggressive about blood sugar control. Orders:  -     insulin glargine (LANTUS SOLOSTAR) 100 UNIT/ML injection pen; Inject 20 Units into the skin nightly, Disp-5 Adjustable Dose Pre-filled Pen Syringe, R-0Normal  -     insulin lispro, 1 Unit Dial, (HUMALOG KWIKPEN) 100 UNIT/ML SOPN; Inject 10 Units into the skin 3 times daily (before meals), Disp-15 mL, R-1Normal  -     Insulin Pen Needle ([employer] PEN NEEDLES 29G) 29G X 12MM MISC; DAILY Starting 2022, Disp-100 each, R-3, Normal    No follow-ups on file. SUBJECTIVE/OBJECTIVE:  HPI    Has had some throat itching with eating oranges, bananas and strawberries. Apples don't really bother her. No prior episodes. Has a rash on her legs currently. Trying to get pregnant. Just got . Wants to kno what to do with her DM regimen.      Review of Systems    Current Outpatient Medications on File Prior to Visit   Medication Sig Dispense Refill    albuterol sulfate HFA (PROVENTIL;VENTOLIN;PROAIR) 108 (90 Base) MCG/ACT inhaler TAKE 2 PUFFS BY MOUTH EVERY 6 HOURS AS NEEDED FOR WHEEZE 8.5 each 7    NIFEdipine (ADALAT CC) 30 MG extended release tablet TAKE 1 TABLET BY MOUTH EVERY DAY 90 tablet 0    omeprazole (PRILOSEC) 40 MG delayed release capsule TAKE 1 CAPSULE BY MOUTH EVERY DAY IN THE MORNING BEFORE BREAKFAST 30 capsule 5    metFORMIN (GLUCOPHAGE-XR) 500 MG extended release tablet TAKE 1 TABLET BY MOUTH EVERY DAY AT NIGHT 90 tablet 1    triamcinolone (ARISTOCORT) 0.5 % cream APPLY TO AFFECTED AREA TWICE A DAY 30 g 2    Dulaglutide (TRULICITY) 1.5 RE/8.1UO SOPN Inject 1.5 mg into the skin once a week 4 pen 5    Loratadine-Pseudoephedrine (CLARITIN-D 24 HOUR PO) Take by mouth      vitamin D (CHOLECALCIFEROL) 50 MCG (2000 UT) TABS tablet TAKE 1 TABLET BY MOUTH EVERY DAY 30 tablet 11    OneTouch Delica Lancets 54A MISC 1 each by Does not apply route daily 100 each 11    Lancet Devices (ONE TOUCH DELICA LANCING DEV) MISC 1 Device by Does not apply route daily 1 each 0    Blood Glucose Monitoring Suppl (ONE TOUCH ULTRA 2) w/Device KIT 1 kit by Does not apply route daily 1 kit 0    blood glucose test strips (ONETOUCH ULTRA) strip 1 each by In Vitro route daily 100 each 11    ondansetron (ZOFRAN ODT) 4 MG disintegrating tablet Take 1 tablet by mouth every 8 hours as needed for Nausea or Vomiting Let dissolve in mouth. (Patient not taking: Reported on 12/13/2022) 10 tablet 0    [DISCONTINUED] vitamin D (ERGOCALCIFEROL) 1.25 MG (38434 UT) CAPS capsule TAKE 1 CAPSULE BY MOUTH ONE TIME PER WEEK 4 capsule 1     No current facility-administered medications on file prior to visit. Patient-Reported Vitals 12/21/2020   Patient-Reported Weight 266lb   Patient-Reported Height 5'5\"        General - well developed, well nourished, NAD  Mental status - Awake and alert, Oriented x 3. Follows commands. Eyes - EOMI, Sclera normal, No conjunctival discharge or injection  HENT: NCAT, MMM. Normal external ears  Neck - no visualized masses, nl ROM  Pulmonary/Chest - Speaking in full sentences, effort normal, no distress. MSK - Normal gait with no signs of ataxia.   Neuro - No facial asymmetry, no gaze palzy  Skin - no rashes or discoloration of facial skin  Psych - nl appearance and grooming, affect appropriate to mood, no hallucinations  Other pertinent observable physical exam findings-           On this date 12/13/2022 I have spent 30 minutes reviewing previous notes, test results and face to face (virtual) with the patient discussing the diagnosis and importance of compliance with the treatment plan as well as documenting on the day of the visit. Irvine Peabody, was evaluated through a synchronous (real-time) audio-video encounter. The patient (or guardian if applicable) is aware that this is a billable service, which includes applicable co-pays. This Virtual Visit was conducted with patient's (and/or legal guardian's) consent. The visit was conducted pursuant to the emergency declaration under the [address] [place] [address], [address] [place] [address] authority and the [employer] Act. Patient identification was verified, and a caregiver was present when appropriate. The patient was located in a state where the provider was licensed to provide care. Patient identification was verified at the start of the visit: Yes    Services were provided through a video synchronous discussion virtually to substitute for in-person clinic visit. Patient was located at home and provider was located in office or at home. An electronic signature was used to authenticate this note.     --Hermilo Hammond MD

## 2022-12-13 NOTE — ASSESSMENT & PLAN NOTE
- Maternal History


Mother's Age: 28


 Status: 


Mother's Blood Type: O(+)


HBSAG: Negative


Date: 18


RPR: Negative


Date: 18


Group B Strep: Positive


GBS Treated in Labor: No


HIV: Negative





- Maternal Risks


OB Risks: PAST HX CLAMYDIA, NEGATIVE THIS PREGNANCY, PREVIOUS C/SECTION X3 2009, 2010, 2017 (H/O GEST DIABETES), SABX1, IABX2





 Data





- Admission


Date of Admission: 18


Admission Time: 21:55


Date of Delivery: 18


Time of Delivery: 21:55


Wks Gestation by Dates: 39.3


Wks Gestation by Sono: 38.1


Infant Gender: Female


Type of Delivery: Repeat C/S


Apgar Score @1 Minute: 9


Apgar score @ 5 Minutes: 9


Birth Weight: 7 lb 15 oz


Birth Length: 19 in


Head Circumference, Admission: 34


Chest Circumference: 36.5


Abdominal Girth: 36.5





- Vital Signs


  ** Left Upper Arm


Blood Pressure: 65/31


Blood Pressure Mean: 42





  ** Left Calf


Blood Pressure: 65/47


Blood Pressure Mean: 53





  ** Right Upper Arm


Blood Pressure: 58/41


Blood Pressure Mean: 46





  ** Right Calf


Blood Pressure: 59/38


Blood Pressure Mean: 45





- Hearing Screen


Left Ear: Passed


Right Ear: Passed


Hearing Screen Complete: 18





- Labs


Labs: 


 Transcutaneous Bilirubin











Transcutaneous Bilirubin       18





performed                      


 


Transcutaneous Bilirubin       18





performed                      


 


Transcutaneous Bilirubin       10





result                         


 


Transcutaneous Bilirubin       7.9





result                         











 Baby's Blood Type, Hugo











Cord Blood Type  O POSITIVE   18  21:56    


 


LINDA, Poly Interpret  Negative  (NEGATIVE)   18  21:56    














- [facility] Screening


 Screening Card Number: [id number]





- Hepatitis B Vaccine Given


Date: 





Medications








Hepatitis B Vaccine (Engerix-B 10 Mcg/0.5 Ml *Pediatric* -)  10 mcg IM .ONCE ONE


   Stop: 18 01:01











[place] [facility], Discharge





- Physical Exam


Last Weight Documented: 7 lb 11.847 oz


Vital Signs: 


 Vital Signs











Temperature  98.2 F   18 21:00


 


Pulse Rate  132   18 22:01


 


Respiratory Rate  42   18 22:01


 


Blood Pressure  65/31   18 13:06


 


O2 Sat by Pulse Oximetry (%)      








 SpO2





Preductal SpO2, Right Arm        100


Postductal SpO2 [Right Leg]      99








General Appearance: Yes: Well flexed, Spontaneous movements, Pink


Skin: Yes: No Abnormalities


Head: Yes: Fontanel flat


Eyes: Yes: Clear


Ears: Yes: Symmetrical


Nose: Yes: Nares patent


Mouth: No: Cleft lip, Cleft palate


Chest: Yes: Symmetrical


Lungs/Respiratory: Yes: Clear, Bilateral good air entry.  No: Sternal 

retractions, Substernal retractions, Subcostal retractions


Cardiac: Yes: S1, S2, Peripheral pulses strong, Capillary refill immediat.  No: 

Murmur


Abdomen: No: Mass palpable


Gastrointestinal: No: Hepatomegaly, Splenomegaly


Genitalia: No Abnormalities


Genitalia, Female: Yes: Labia Normal


Anus: Yes: Patent


Extremities: Yes: No Abnormalities


Spine: No: Sacral dimple, Hair tuft


Reflexes: [place]: Present, Rooting: Present, Sucking: Present


Neuro: Yes: Alert, Active


Cry: Yes: Strong


Preductal SpO2, Right Arm: 100


  ** Right Leg


Postductal SpO2: 99





Problem List





- Problems


(1) Single liveborn infant, delivered by 


Assessment/Plan: 


AGA FEMALE BORN TO 27YO , GBS POS MOTHER WITH ROM AT DELIVERY.PT IS STABLE 

- FEEDING VOIDING AND STOOLING


P: ROUTINE CARE


FEED AD SAYRA


DISCHARGE HOME


Code(s): Z38.01 - SINGLE LIVEBORN INFANT, DELIVERED BY    





Discharge Summary


Reason For Visit: 


Current Active Problems





Liveborn by  (Acute)


Single liveborn infant, delivered by  (Acute)








Condition: Good





- Instructions


Referrals: 


Dion Lora MD [Staff Physician] - 18


Disposition: HOME Control has been much better lately on metformin and trulicity but actively trying to get pregnant. Will change her to insulin. Will use synthetic initially but may need change to NPH/r. Lantus 20 units daily and lispro 10 units with meals. Check BG frequently. Discussed that need to be aggressive about blood sugar control.

## 2022-12-14 DIAGNOSIS — E11.65 UNCONTROLLED TYPE 2 DIABETES MELLITUS WITH HYPERGLYCEMIA (HCC): ICD-10-CM

## 2022-12-15 RX ORDER — DULAGLUTIDE 1.5 MG/.5ML
1.5 INJECTION, SOLUTION SUBCUTANEOUS WEEKLY
Qty: 4 ADJUSTABLE DOSE PRE-FILLED PEN SYRINGE | Refills: 1 | Status: SHIPPED | OUTPATIENT
Start: 2022-12-15

## 2022-12-20 ENCOUNTER — OFFICE VISIT (OUTPATIENT)
Dept: INTERNAL MEDICINE CLINIC | Age: 32
End: 2022-12-20
Payer: COMMERCIAL

## 2022-12-20 VITALS
HEIGHT: 65 IN | HEART RATE: 90 BPM | BODY MASS INDEX: 45.22 KG/M2 | OXYGEN SATURATION: 96 % | WEIGHT: 271.4 LBS | DIASTOLIC BLOOD PRESSURE: 76 MMHG | SYSTOLIC BLOOD PRESSURE: 124 MMHG

## 2022-12-20 DIAGNOSIS — L02.01 ABSCESS OF FACE: ICD-10-CM

## 2022-12-20 DIAGNOSIS — Z13.6 ENCOUNTER FOR LIPID SCREENING FOR CARDIOVASCULAR DISEASE: ICD-10-CM

## 2022-12-20 DIAGNOSIS — Z79.4 CONTROLLED TYPE 2 DIABETES MELLITUS WITHOUT COMPLICATION, WITH LONG-TERM CURRENT USE OF INSULIN (HCC): Primary | ICD-10-CM

## 2022-12-20 DIAGNOSIS — Z13.220 ENCOUNTER FOR LIPID SCREENING FOR CARDIOVASCULAR DISEASE: ICD-10-CM

## 2022-12-20 DIAGNOSIS — L44.1 LICHEN NITIDUS: ICD-10-CM

## 2022-12-20 DIAGNOSIS — T78.1XXA POLLEN-FOOD ALLERGY, INITIAL ENCOUNTER: ICD-10-CM

## 2022-12-20 DIAGNOSIS — E11.9 CONTROLLED TYPE 2 DIABETES MELLITUS WITHOUT COMPLICATION, WITH LONG-TERM CURRENT USE OF INSULIN (HCC): Primary | ICD-10-CM

## 2022-12-20 PROBLEM — E11.65 UNCONTROLLED TYPE 2 DIABETES MELLITUS WITH HYPERGLYCEMIA (HCC): Status: RESOLVED | Noted: 2022-04-26 | Resolved: 2022-12-20

## 2022-12-20 LAB
CHOLESTEROL, TOTAL: 165 MG/DL (ref 0–199)
CREATININE URINE: 176 MG/DL (ref 28–259)
HBA1C MFR BLD: 7.1 %
HDLC SERPL-MCNC: 53 MG/DL (ref 40–60)
LDL CHOLESTEROL CALCULATED: 68 MG/DL
MICROALBUMIN UR-MCNC: <1.2 MG/DL
MICROALBUMIN/CREAT UR-RTO: NORMAL MG/G (ref 0–30)
TRIGL SERPL-MCNC: 219 MG/DL (ref 0–150)
VLDLC SERPL CALC-MCNC: 44 MG/DL

## 2022-12-20 PROCEDURE — 83036 HEMOGLOBIN GLYCOSYLATED A1C: CPT | Performed by: INTERNAL MEDICINE

## 2022-12-20 PROCEDURE — 3074F SYST BP LT 130 MM HG: CPT | Performed by: INTERNAL MEDICINE

## 2022-12-20 PROCEDURE — 99214 OFFICE O/P EST MOD 30 MIN: CPT | Performed by: INTERNAL MEDICINE

## 2022-12-20 PROCEDURE — G8427 DOCREV CUR MEDS BY ELIG CLIN: HCPCS | Performed by: INTERNAL MEDICINE

## 2022-12-20 PROCEDURE — G8484 FLU IMMUNIZE NO ADMIN: HCPCS | Performed by: INTERNAL MEDICINE

## 2022-12-20 PROCEDURE — 3078F DIAST BP <80 MM HG: CPT | Performed by: INTERNAL MEDICINE

## 2022-12-20 PROCEDURE — G8417 CALC BMI ABV UP PARAM F/U: HCPCS | Performed by: INTERNAL MEDICINE

## 2022-12-20 PROCEDURE — 3051F HG A1C>EQUAL 7.0%<8.0%: CPT | Performed by: INTERNAL MEDICINE

## 2022-12-20 PROCEDURE — 2022F DILAT RTA XM EVC RTNOPTHY: CPT | Performed by: INTERNAL MEDICINE

## 2022-12-20 PROCEDURE — 4004F PT TOBACCO SCREEN RCVD TLK: CPT | Performed by: INTERNAL MEDICINE

## 2022-12-20 RX ORDER — BLOOD-GLUCOSE SENSOR
EACH MISCELLANEOUS
Qty: 6 EACH | Refills: 5 | Status: SHIPPED | OUTPATIENT
Start: 2022-12-20

## 2022-12-20 RX ORDER — DOXYCYCLINE HYCLATE 100 MG
100 TABLET ORAL 2 TIMES DAILY
Qty: 10 TABLET | Refills: 0 | Status: SHIPPED | OUTPATIENT
Start: 2022-12-20 | End: 2022-12-25

## 2022-12-20 RX ORDER — FLASH GLUCOSE SCANNING READER
EACH MISCELLANEOUS
Qty: 1 EACH | Refills: 0 | Status: SHIPPED | OUTPATIENT
Start: 2022-12-20

## 2022-12-20 NOTE — ASSESSMENT & PLAN NOTE
Significant improvement in control. A1c down to 7.1% from 9.3% unfortunately we have to stop the Trulicity because of her desire to get pregnant. She is planning to transition to insulin today. She is going to start 20 units of Lantus and 10 units of lispro with meals. We will try to get her CGM so that she can monitor her blood sugars more closely.

## 2022-12-20 NOTE — PROGRESS NOTES
Radha Cali (:  1990) is a 29 y.o. female,Established patient, here for evaluation of the following chief complaint(s):  Diabetes      ASSESSMENT/PLAN:  1. Controlled type 2 diabetes mellitus without complication, with long-term current use of insulin ([facility])  Assessment & Plan:  Significant improvement in control. A1c down to 7.1% from 9.3% unfortunately we have to stop the Trulicity because of her desire to get pregnant. She is planning to transition to insulin today. She is going to start 20 units of Lantus and 10 units of lispro with meals. We will try to get her CGM so that she can monitor her blood sugars more closely. Orders:  -     POCT glycosylated hemoglobin (Hb A1C)  -     Continuous Blood Gluc Sensor (FREESTYLE RYAN 3 SENSOR) MISC; Use as directed, Disp-6 each, R-5Normal  -     Continuous Blood Gluc  (FREESTYLE RYAN 14 DAY READER) SHANE; Use as directed, Disp-1 each, R-0Normal  -     Microalbumin / Creatinine Urine Ratio  2. Abscess of face  Comments:  Swab for HSV today. Treat with doxy  Orders:  -     doxycycline hyclate (VIBRA-TABS) 100 MG tablet; Take 1 tablet by mouth 2 times daily for 5 days, Disp-10 tablet, R-0Normal  -     HSV PCR  3. Lichen nitidus  Comments:  Discussed self limited nature of this condition. Steroid ointment rx'ed given itching. Monitor. Derm prn. Orders:  -     triamcinolone (KENALOG) 0.1 % ointment; Apply topically 2 times daily for 7 days, Topical, 2 TIMES DAILY Starting 2022, Until 2022, For 7 days, Disp-453.6 g, R-0, Normal  4. Encounter for lipid screening for cardiovascular disease  -     Lipid Panel; Future      Return in about 3 months (around 3/20/2023). SUBJECTIVE/OBJECTIVE:  HPI    She has had a rash on the back of her legs for 2 weeks. They are little pinpoint papules they are itchy. She also has some on the fronts of her legs and in the middle that are not as itchy.     She in the middle of her forehead has a small painful red hard spot that has a few heads on it. She has never had anything like this before. It is right where her head scarf sits where she sleeps. Today is her normal Trulicity day so she is planning to start her insulin today    Review of Systems    Current Outpatient Medications on File Prior to Visit   Medication Sig Dispense Refill    TRULICITY 1.5 OV/6.2LO SC injection INJECT 1.5 MG INTO THE SKIN ONCE A WEEK 4 Adjustable Dose Pre-filled Pen Syringe 1    insulin glargine (LANTUS SOLOSTAR) 100 UNIT/ML injection pen Inject 20 Units into the skin nightly 5 Adjustable Dose Pre-filled Pen Syringe 0    insulin lispro, 1 Unit Dial, (HUMALOG KWIKPEN) 100 UNIT/ML SOPN Inject 10 Units into the skin 3 times daily (before meals) 15 mL 1    Insulin Pen Needle ([employer] PEN NEEDLES 29G) 29G X 12MM MISC 1 each by Does not apply route daily 100 each 3    ondansetron (ZOFRAN ODT) 4 MG disintegrating tablet Take 1 tablet by mouth every 8 hours as needed for Nausea or Vomiting Let dissolve in mouth.  10 tablet 0    albuterol sulfate HFA (PROVENTIL;VENTOLIN;PROAIR) 108 (90 Base) MCG/ACT inhaler TAKE 2 PUFFS BY MOUTH EVERY 6 HOURS AS NEEDED FOR WHEEZE 8.5 each 7    NIFEdipine (ADALAT CC) 30 MG extended release tablet TAKE 1 TABLET BY MOUTH EVERY DAY 90 tablet 0    omeprazole (PRILOSEC) 40 MG delayed release capsule TAKE 1 CAPSULE BY MOUTH EVERY DAY IN THE MORNING BEFORE BREAKFAST 30 capsule 5    metFORMIN (GLUCOPHAGE-XR) 500 MG extended release tablet TAKE 1 TABLET BY MOUTH EVERY DAY AT NIGHT 90 tablet 1    triamcinolone (ARISTOCORT) 0.5 % cream APPLY TO AFFECTED AREA TWICE A DAY 30 g 2    Loratadine-Pseudoephedrine (CLARITIN-D 24 HOUR PO) Take by mouth      vitamin D (CHOLECALCIFEROL) 50 MCG (2000 UT) TABS tablet TAKE 1 TABLET BY MOUTH EVERY DAY 30 tablet 11    OneTouch Delica Lancets 97R MISC 1 each by Does not apply route daily 100 each 11    Lancet Devices (ONE TOUCH DELICA LANCING DEV) MISC 1 Device by Does not apply route daily 1 each 0    Blood Glucose Monitoring Suppl (ONE TOUCH ULTRA 2) w/Device KIT 1 kit by Does not apply route daily 1 kit 0    blood glucose test strips (ONETOUCH ULTRA) strip 1 each by In Vitro route daily 100 each 11    [DISCONTINUED] vitamin D (ERGOCALCIFEROL) 1.25 MG (55988 UT) CAPS capsule TAKE 1 CAPSULE BY MOUTH ONE TIME PER WEEK 4 capsule 1     No current facility-administered medications on file prior to visit. Vitals:    12/20/22 0833   BP: 124/76   Pulse: 90   SpO2: 96%     Physical Exam  Constitutional:       General: She is not in acute distress. Appearance: Normal appearance. She is not diaphoretic. HENT:      Head: Normocephalic and atraumatic. Right Ear: External ear normal.      Left Ear: External ear normal.      Nose: Nose normal.      Mouth/Throat:      Mouth: Mucous membranes are moist.      Pharynx: Oropharynx is clear. Eyes:      General: No scleral icterus. Conjunctiva/sclera: Conjunctivae normal.   Cardiovascular:      Rate and Rhythm: Normal rate and regular rhythm. Pulses: Normal pulses. Heart sounds: Normal heart sounds. No murmur heard. No friction rub. No gallop. Pulmonary:      Effort: Pulmonary effort is normal.      Breath sounds: Normal breath sounds. No wheezing, rhonchi or rales. Abdominal:      General: Abdomen is flat. Bowel sounds are normal.   Musculoskeletal:         General: No deformity. Cervical back: Normal range of motion. Right lower leg: No edema. Left lower leg: No edema. Skin:     General: Skin is warm and dry. Findings: Lesion (Forehead 2x3 cm area of erythema/induration with 4 small pustular lesions on top) and rash (pinpoint papules on backs of thighs and medial thighs. Some secondary signs of excoriation) present. Neurological:      General: No focal deficit present. Mental Status: She is alert.       Coordination: Coordination normal.      Gait: Gait normal.   Psychiatric: Mood and Affect: Mood normal.         Behavior: Behavior normal.         On this date 12/20/2022 I have spent 30 minutes reviewing previous notes, test results and face to face with the patient discussing the diagnosis and importance of compliance with the treatment plan as well as documenting on the day of the visit. An electronic signature was used to authenticate this note.     --Karen Anne MD

## 2022-12-20 NOTE — LETTER
[facility]  [address] [place] [address] [address] [facility] [address]  Phone: [phone]  Fax: [phone]    Liz Barraza MD        December 20, 2022     Patient: Lopez Eric   YOB: 1990   Date of Visit: 12/20/2022       To Whom It May Concern: It is my medical opinion that Mckenzie Rand may return to work on 12/21/22. She was seen in my office on 12/20/22. .    If you have any questions or concerns, please don't hesitate to call.     Sincerely,        Liz Barraza MD

## 2022-12-22 LAB
ALLERGEN SEE NOTE: NORMAL
FINAL REPORT: NORMAL
Lab: 0.32 KU/L
PRELIMINARY: NORMAL

## 2022-12-24 LAB
ALLERGEN BANANA: <0.1 KU/L (ref 0–0.34)
ALLERGEN ORANGE IGE: <0.1 KU/L (ref 0–0.34)
ALLERGEN PEANUT (F13) IGE: <0.1 KU/L (ref 0–0.34)
ALLERGEN STRAWBERRY IGE: <0.1 KU/L (ref 0–0.34)

## 2023-01-06 DIAGNOSIS — E11.9 CONTROLLED TYPE 2 DIABETES MELLITUS WITHOUT COMPLICATION, WITHOUT LONG-TERM CURRENT USE OF INSULIN (HCC): ICD-10-CM

## 2023-01-06 DIAGNOSIS — L44.1 LICHEN NITIDUS: ICD-10-CM

## 2023-01-09 DIAGNOSIS — Z79.4 CONTROLLED TYPE 2 DIABETES MELLITUS WITHOUT COMPLICATION, WITH LONG-TERM CURRENT USE OF INSULIN (HCC): ICD-10-CM

## 2023-01-09 DIAGNOSIS — E11.9 CONTROLLED TYPE 2 DIABETES MELLITUS WITHOUT COMPLICATION, WITH LONG-TERM CURRENT USE OF INSULIN (HCC): ICD-10-CM

## 2023-01-09 RX ORDER — INSULIN GLARGINE 100 [IU]/ML
20 INJECTION, SOLUTION SUBCUTANEOUS NIGHTLY
Qty: 15 ML | Refills: 5 | Status: SHIPPED | OUTPATIENT
Start: 2023-01-09

## 2023-01-09 RX ORDER — BLOOD-GLUCOSE SENSOR
EACH MISCELLANEOUS
Qty: 6 EACH | Refills: 5 | Status: SHIPPED | OUTPATIENT
Start: 2023-01-09

## 2023-01-09 NOTE — TELEPHONE ENCOUNTER
Patient is calling to request a refill on her prescription for:  Continuous Blood Gluc Sensor (FREESTYLE RYAN 3 SENSOR) [facility]  Last appointment: 12/20/2022  Next appointment: 3/23/2023  Last refill: 12/20/2022      Please call in to:  [facility]/pharmacy #[id number]- [place], [place] - [address] [facility]. [facility] [phone] [place] [phone] [address] [facility] [address]   Phone:  [phone]  Fax:  [phone]    Please contact patient with any additional questions.

## 2023-01-13 DIAGNOSIS — R07.9 LEFT-SIDED CHEST PAIN: ICD-10-CM

## 2023-01-13 RX ORDER — OMEPRAZOLE 40 MG/1
CAPSULE, DELAYED RELEASE ORAL
Qty: 90 CAPSULE | Refills: 1 | Status: SHIPPED | OUTPATIENT
Start: 2023-01-13

## 2023-01-29 DIAGNOSIS — E11.9 CONTROLLED TYPE 2 DIABETES MELLITUS WITHOUT COMPLICATION, WITHOUT LONG-TERM CURRENT USE OF INSULIN (HCC): ICD-10-CM

## 2023-01-31 RX ORDER — METFORMIN HYDROCHLORIDE 500 MG/1
TABLET, EXTENDED RELEASE ORAL
Qty: 90 TABLET | Refills: 1 | Status: SHIPPED | OUTPATIENT
Start: 2023-01-31

## 2023-02-10 DIAGNOSIS — E11.9 CONTROLLED TYPE 2 DIABETES MELLITUS WITHOUT COMPLICATION, WITHOUT LONG-TERM CURRENT USE OF INSULIN (HCC): ICD-10-CM

## 2023-02-10 NOTE — TELEPHONE ENCOUNTER
Last appointment: 12/20/2022  Next appointment: 3/23/2023  Last refill: 12/13/2022      Patient is requesting to get more than 100 at a time. She injects 4 times a day and 100 is not enough for one month. Please let me know if this is okay?

## 2023-02-11 RX ORDER — INSULIN LISPRO 100 [IU]/ML
10 INJECTION, SOLUTION INTRAVENOUS; SUBCUTANEOUS
Qty: 15 ADJUSTABLE DOSE PRE-FILLED PEN SYRINGE | Refills: 1 | Status: SHIPPED | OUTPATIENT
Start: 2023-02-11

## 2023-02-11 RX ORDER — PEN NEEDLE, DIABETIC 29 GAUGE
1 NEEDLE, DISPOSABLE MISCELLANEOUS DAILY
Qty: 100 EACH | Refills: 3 | Status: SHIPPED | OUTPATIENT
Start: 2023-02-11

## 2023-03-06 ENCOUNTER — TELEPHONE (OUTPATIENT)
Dept: INTERNAL MEDICINE CLINIC | Age: 33
End: 2023-03-06

## 2023-03-06 NOTE — TELEPHONE ENCOUNTER
Given that she now have left arm numbness along with the headache I think she needs to go to ER. Hopefully this is carpal tunnel or ulnar neuropathy but we need to be sure.

## 2023-03-06 NOTE — TELEPHONE ENCOUNTER
Patient is calling regarding a sharp, shooting pain in the left side of her head for 2 weeks now. Patient has also been having bad migraines. She states she has been experiencing a tingling in her left arm and hand constantly. She claims that for the past 2 days, she has been moving furniture at work and doing more physical activity which has caused her to become nauseous, dizzy and to have some SOB when sitting down afterwards. She states that when the shooting pain goes away, it turns into a migraine. Patient does not monitor her BP. She states her blood sugar yesterday was 140. She has been taking OTC medications such as Midol and ibuprofen. Patient claims these are not helping. She did also mention that she has been experiencing heavier bleeding, and longer and worse cramps than prior to her surgery with Dr. Renard Mitchell. Medication is not helpings with this either. Please advise.

## 2023-03-09 ENCOUNTER — TELEMEDICINE (OUTPATIENT)
Dept: INTERNAL MEDICINE CLINIC | Age: 33
End: 2023-03-09
Payer: COMMERCIAL

## 2023-03-09 DIAGNOSIS — G43.111 INTRACTABLE MIGRAINE WITH AURA WITH STATUS MIGRAINOSUS: Primary | ICD-10-CM

## 2023-03-09 DIAGNOSIS — R20.0 LEFT ARM NUMBNESS: ICD-10-CM

## 2023-03-09 DIAGNOSIS — G44.85 STABBING HEADACHE: ICD-10-CM

## 2023-03-09 DIAGNOSIS — E11.9 CONTROLLED TYPE 2 DIABETES MELLITUS WITHOUT COMPLICATION, WITH LONG-TERM CURRENT USE OF INSULIN (HCC): ICD-10-CM

## 2023-03-09 DIAGNOSIS — Z79.4 CONTROLLED TYPE 2 DIABETES MELLITUS WITHOUT COMPLICATION, WITH LONG-TERM CURRENT USE OF INSULIN (HCC): ICD-10-CM

## 2023-03-09 PROCEDURE — 99214 OFFICE O/P EST MOD 30 MIN: CPT | Performed by: INTERNAL MEDICINE

## 2023-03-09 PROCEDURE — 3046F HEMOGLOBIN A1C LEVEL >9.0%: CPT | Performed by: INTERNAL MEDICINE

## 2023-03-09 PROCEDURE — G8427 DOCREV CUR MEDS BY ELIG CLIN: HCPCS | Performed by: INTERNAL MEDICINE

## 2023-03-09 PROCEDURE — 2022F DILAT RTA XM EVC RTNOPTHY: CPT | Performed by: INTERNAL MEDICINE

## 2023-03-09 RX ORDER — RIZATRIPTAN BENZOATE 10 MG/1
10 TABLET ORAL
Qty: 9 TABLET | Refills: 5 | Status: SHIPPED | OUTPATIENT
Start: 2023-03-09 | End: 2023-03-09

## 2023-03-09 RX ORDER — INDOMETHACIN 25 MG/1
25 CAPSULE ORAL 3 TIMES DAILY
Qty: 60 CAPSULE | Refills: 3 | Status: SHIPPED | OUTPATIENT
Start: 2023-03-09 | End: 2023-03-23

## 2023-03-09 SDOH — ECONOMIC STABILITY: FOOD INSECURITY: WITHIN THE PAST 12 MONTHS, YOU WORRIED THAT YOUR FOOD WOULD RUN OUT BEFORE YOU GOT MONEY TO BUY MORE.: NEVER TRUE

## 2023-03-09 SDOH — ECONOMIC STABILITY: HOUSING INSECURITY
IN THE LAST 12 MONTHS, WAS THERE A TIME WHEN YOU DID NOT HAVE A STEADY PLACE TO SLEEP OR SLEPT IN A SHELTER (INCLUDING NOW)?: NO

## 2023-03-09 SDOH — ECONOMIC STABILITY: FOOD INSECURITY: WITHIN THE PAST 12 MONTHS, THE FOOD YOU BOUGHT JUST DIDN'T LAST AND YOU DIDN'T HAVE MONEY TO GET MORE.: NEVER TRUE

## 2023-03-09 SDOH — ECONOMIC STABILITY: INCOME INSECURITY: HOW HARD IS IT FOR YOU TO PAY FOR THE VERY BASICS LIKE FOOD, HOUSING, MEDICAL CARE, AND HEATING?: NOT VERY HARD

## 2023-03-09 ASSESSMENT — PATIENT HEALTH QUESTIONNAIRE - PHQ9: DEPRESSION UNABLE TO ASSESS: URGENT/EMERGENT SITUATION

## 2023-03-09 NOTE — TELEPHONE ENCOUNTER
Patient is calling with concerns of migraines still occurring, numbness in left hand and fingertips, as well as a high blood sugar today. Patient states all she has eaten today is  poptart and some water. Her sugar was at 289 and is now at 287. She is anxious because of this. Patient took 1 units of insulin but has no more needles. She went to the ED on Monday and they prescribed her with a \"migraine cocktail. \" Patient is unsure what this includes, she stated it does include benulissesll but she is not sure what else.  Please contact patient to advise

## 2023-03-09 NOTE — PROGRESS NOTES
vitamin D (ERGOCALCIFEROL) 1.25 MG (43107 UT) CAPS capsule TAKE 1 CAPSULE BY MOUTH ONE TIME PER WEEK 4 capsule 1     No current facility-administered medications on file prior to visit. Patient-Reported Vitals 12/21/2020   Patient-Reported Weight 266lb   Patient-Reported Height 5'5\"        General - well developed, well nourished, NAD  Mental status - Awake and alert, Oriented x 3. Follows commands. Eyes - EOMI, Sclera normal, No conjunctival discharge or injection  HENT: NCAT, MMM. Normal external ears  Neck - no visualized masses, nl ROM  Pulmonary/Chest - Speaking in full sentences, effort normal, no distress. MSK - Normal gait with no signs of ataxia. Neuro - No facial asymmetry, no gaze palzy  Skin - no rashes or discoloration of facial skin  Psych - nl appearance and grooming, affect appropriate to mood, no hallucinations  Other pertinent observable physical exam findings-                   Vimaldailyamrik Burleson, was evaluated through a synchronous (real-time) audio-video encounter. The patient (or guardian if applicable) is aware that this is a billable service, which includes applicable co-pays. This Virtual Visit was conducted with patient's (and/or legal guardian's) consent. The visit was conducted pursuant to the emergency declaration under the [address] [place] [address], [address] [place] [address] authority and the [employer] and [employer] Act. Patient identification was verified, and a caregiver was present when appropriate. The patient was located in a state where the provider was licensed to provide care. Patient identification was verified at the start of the visit: Yes    Services were provided through a video synchronous discussion virtually to substitute for in-person clinic visit. Patient was located at home and provider was located in office or at home. An electronic signature was used to authenticate this note.     --Cheli Officer

## 2023-03-23 ENCOUNTER — OFFICE VISIT (OUTPATIENT)
Dept: INTERNAL MEDICINE CLINIC | Age: 33
End: 2023-03-23
Payer: COMMERCIAL

## 2023-03-23 VITALS
BODY MASS INDEX: 46.48 KG/M2 | OXYGEN SATURATION: 99 % | SYSTOLIC BLOOD PRESSURE: 116 MMHG | HEIGHT: 65 IN | HEART RATE: 85 BPM | DIASTOLIC BLOOD PRESSURE: 70 MMHG | WEIGHT: 279 LBS

## 2023-03-23 DIAGNOSIS — G43.111 INTRACTABLE MIGRAINE WITH AURA WITH STATUS MIGRAINOSUS: Primary | ICD-10-CM

## 2023-03-23 DIAGNOSIS — G44.85 STABBING HEADACHE: ICD-10-CM

## 2023-03-23 DIAGNOSIS — E11.9 CONTROLLED TYPE 2 DIABETES MELLITUS WITHOUT COMPLICATION, WITHOUT LONG-TERM CURRENT USE OF INSULIN (HCC): ICD-10-CM

## 2023-03-23 PROCEDURE — 3074F SYST BP LT 130 MM HG: CPT | Performed by: INTERNAL MEDICINE

## 2023-03-23 PROCEDURE — G8427 DOCREV CUR MEDS BY ELIG CLIN: HCPCS | Performed by: INTERNAL MEDICINE

## 2023-03-23 PROCEDURE — 99214 OFFICE O/P EST MOD 30 MIN: CPT | Performed by: INTERNAL MEDICINE

## 2023-03-23 PROCEDURE — 3078F DIAST BP <80 MM HG: CPT | Performed by: INTERNAL MEDICINE

## 2023-03-23 PROCEDURE — G8417 CALC BMI ABV UP PARAM F/U: HCPCS | Performed by: INTERNAL MEDICINE

## 2023-03-23 PROCEDURE — 3046F HEMOGLOBIN A1C LEVEL >9.0%: CPT | Performed by: INTERNAL MEDICINE

## 2023-03-23 PROCEDURE — 2022F DILAT RTA XM EVC RTNOPTHY: CPT | Performed by: INTERNAL MEDICINE

## 2023-03-23 PROCEDURE — G8484 FLU IMMUNIZE NO ADMIN: HCPCS | Performed by: INTERNAL MEDICINE

## 2023-03-23 PROCEDURE — 4004F PT TOBACCO SCREEN RCVD TLK: CPT | Performed by: INTERNAL MEDICINE

## 2023-03-23 RX ORDER — PEN NEEDLE, DIABETIC 29 GAUGE
1 NEEDLE, DISPOSABLE MISCELLANEOUS 4 TIMES DAILY
Qty: 300 EACH | Refills: 3 | Status: SHIPPED | OUTPATIENT
Start: 2023-03-23

## 2023-03-23 RX ORDER — ALBUTEROL SULFATE 90 UG/1
AEROSOL, METERED RESPIRATORY (INHALATION)
Qty: 18 EACH | Refills: 3 | Status: SHIPPED | OUTPATIENT
Start: 2023-03-23

## 2023-03-28 ENCOUNTER — TELEPHONE (OUTPATIENT)
Dept: INTERNAL MEDICINE CLINIC | Age: 33
End: 2023-03-28

## 2023-03-28 DIAGNOSIS — R20.0 LEFT ARM NUMBNESS: ICD-10-CM

## 2023-03-28 DIAGNOSIS — G44.85 STABBING HEADACHE: ICD-10-CM

## 2023-03-28 DIAGNOSIS — G43.111 INTRACTABLE MIGRAINE WITH AURA WITH STATUS MIGRAINOSUS: Primary | ICD-10-CM

## 2023-03-28 NOTE — TELEPHONE ENCOUNTER
Farrah with [facility] states MRI department needs an order stating without contrast or with and without contrast according to the MRI department because of the diagnosis listed. Any questions/concerns please call Farrah at number provided.

## 2023-04-07 ENCOUNTER — HOSPITAL ENCOUNTER (OUTPATIENT)
Dept: MRI IMAGING | Age: 33
Discharge: HOME | End: 2023-04-07
Payer: COMMERCIAL

## 2023-04-07 DIAGNOSIS — R20.0 LEFT ARM NUMBNESS: ICD-10-CM

## 2023-04-07 DIAGNOSIS — G44.85 STABBING HEADACHE: ICD-10-CM

## 2023-04-07 DIAGNOSIS — G43.111 INTRACTABLE MIGRAINE WITH AURA WITH STATUS MIGRAINOSUS: ICD-10-CM

## 2023-04-07 PROCEDURE — 70553 MRI BRAIN STEM W/O & W/DYE: CPT

## 2023-04-07 PROCEDURE — A9577 INJ MULTIHANCE: HCPCS | Performed by: INTERNAL MEDICINE

## 2023-04-07 PROCEDURE — 6360000004 HC RX CONTRAST MEDICATION: Performed by: INTERNAL MEDICINE

## 2023-04-07 RX ADMIN — GADOBENATE DIMEGLUMINE 20 ML: 529 INJECTION, SOLUTION INTRAVENOUS at 07:48

## 2023-06-07 DIAGNOSIS — E55.9 VITAMIN D DEFICIENCY: ICD-10-CM

## 2023-06-07 NOTE — TELEPHONE ENCOUNTER
Last appointment: 3/23/2023  Next appointment: Visit date not found  Last refill: 4/11/2022  Sent [employer] message to schedule due/overdue appointment.

## 2023-06-08 RX ORDER — CHOLECALCIFEROL (VITAMIN D3) 50 MCG
TABLET ORAL
Qty: 90 TABLET | Refills: 1 | Status: SHIPPED | OUTPATIENT
Start: 2023-06-08

## 2023-07-22 DIAGNOSIS — R07.9 LEFT-SIDED CHEST PAIN: ICD-10-CM

## 2023-07-24 RX ORDER — OMEPRAZOLE 40 MG/1
CAPSULE, DELAYED RELEASE ORAL
Qty: 90 CAPSULE | Refills: 1 | Status: SHIPPED | OUTPATIENT
Start: 2023-07-24

## 2023-08-14 DIAGNOSIS — E11.9 CONTROLLED TYPE 2 DIABETES MELLITUS WITHOUT COMPLICATION, WITHOUT LONG-TERM CURRENT USE OF INSULIN (HCC): ICD-10-CM

## 2023-08-15 RX ORDER — ALBUTEROL SULFATE 90 UG/1
AEROSOL, METERED RESPIRATORY (INHALATION)
Qty: 18 EACH | Refills: 3 | Status: SHIPPED | OUTPATIENT
Start: 2023-08-15

## 2023-08-15 RX ORDER — PEN NEEDLE, DIABETIC 29 G X1/2"
NEEDLE, DISPOSABLE MISCELLANEOUS
Qty: 100 EACH | Refills: 3 | Status: SHIPPED | OUTPATIENT
Start: 2023-08-15

## 2023-08-15 NOTE — TELEPHONE ENCOUNTER
Patient is requesting a re-fill on Albuterol Sulfate to  [facility] on [address]. Last re-fill:   3/23/2023. Last visit:3/23/2023. Next visit:  9/19/2023. She said thank you for taking care of other 2 [id number] is an additional one she needs.

## 2023-09-14 DIAGNOSIS — E11.9 CONTROLLED TYPE 2 DIABETES MELLITUS WITHOUT COMPLICATION, WITHOUT LONG-TERM CURRENT USE OF INSULIN (HCC): ICD-10-CM

## 2023-09-14 RX ORDER — INSULIN LISPRO 100 [IU]/ML
10 INJECTION, SOLUTION INTRAVENOUS; SUBCUTANEOUS
Qty: 15 ADJUSTABLE DOSE PRE-FILLED PEN SYRINGE | Refills: 1 | Status: SHIPPED | OUTPATIENT
Start: 2023-09-14

## (undated) DEVICE — BANDAGE PATCH BANDAID 1.5X1.5IN

## (undated) DEVICE — TOTAL TRAY, 16FR 10ML SIL FOLEY, URN: Brand: MEDLINE

## (undated) DEVICE — TROCAR: Brand: KII SHIELDED BLADED ACCESS SYSTEM

## (undated) DEVICE — SOLUTION IV IRRIG POUR BRL 0.9% SODIUM CHL 2F7124

## (undated) DEVICE — Device

## (undated) DEVICE — GLOVE SURG SZ 7 CRM LTX FREE POLYISOPRENE POLYMER BEAD ANTI

## (undated) DEVICE — SOLUTION SCRB 4OZ 10% POVIDONE IOD ANTIMIC BTL

## (undated) DEVICE — GAUZE,SPONGE,2"X2",8PLY,STERILE,LF,2'S: Brand: MEDLINE

## (undated) DEVICE — PAD,NON-ADHERENT,3X8,STERILE,LF,1/PK: Brand: MEDLINE

## (undated) DEVICE — TROCAR: Brand: KII SLEEVE

## (undated) DEVICE — NEEDLE HYPO 25GA L1.5IN BVL ORIENTED ECLIPSE

## (undated) DEVICE — TISSUE RETRIEVAL SYSTEM: Brand: INZII RETRIEVAL SYSTEM

## (undated) DEVICE — SCISSORS ENDOSCP DIA5MM CRV MPLR CAUT W/ RATCH HNDL

## (undated) DEVICE — TROCAR: Brand: KII® SHEILDED BLADED ACCESS SYSEM

## (undated) DEVICE — SYRINGE MED 50ML LUERLOCK TIP

## (undated) DEVICE — ELECTRODE PT RET AD L9FT HI MOIST COND ADH HYDRGEL CORDED

## (undated) DEVICE — SET EXTN IV L30IN TBNG DIA0.1IN PRIMING 4ML MACBOR FEM ADPT

## (undated) DEVICE — SEALER ENDOSCP L37CM NANO COAT BLNT TIP LAP DIV

## (undated) DEVICE — CATHETER,URETHRAL,REDRUBBER,STRL,16FR: Brand: MEDLINE

## (undated) DEVICE — GOWN SIRUS NONREIN LG W/TWL: Brand: MEDLINE INDUSTRIES, INC.

## (undated) DEVICE — TROCAR: Brand: KII FIOS FIRST ENTRY

## (undated) DEVICE — MASTISOL ADHESIVE LIQ 2/3ML

## (undated) DEVICE — SUTURE SZ 0 27IN 5/8 CIR UR-6  TAPER PT VIOLET ABSRB VICRYL J603H

## (undated) DEVICE — GYN LAPAROSCOPY: Brand: MEDLINE INDUSTRIES, INC.

## (undated) DEVICE — SOLUTION PREP POVIDONE IOD FOR SKIN MUCOUS MEM PRIOR TO

## (undated) DEVICE — APPLICATOR ST RAYON TIP

## (undated) DEVICE — SET INSUF TUBE HEAT ISO CONN DISP